# Patient Record
Sex: MALE | Race: WHITE | Employment: PART TIME | URBAN - METROPOLITAN AREA
[De-identification: names, ages, dates, MRNs, and addresses within clinical notes are randomized per-mention and may not be internally consistent; named-entity substitution may affect disease eponyms.]

---

## 2017-01-01 ENCOUNTER — HOSPITAL ENCOUNTER (OUTPATIENT)
Dept: INFUSION THERAPY | Age: 67
Discharge: HOME | End: 2017-05-30
Payer: MEDICARE

## 2017-01-01 ENCOUNTER — HOSPITAL ENCOUNTER (OUTPATIENT)
Dept: LAB | Age: 67
Discharge: HOME | DRG: 445 | End: 2017-10-09
Payer: MEDICARE

## 2017-01-01 ENCOUNTER — APPOINTMENT (OUTPATIENT)
Dept: CT IMAGING | Age: 67
DRG: 388 | End: 2017-01-01
Attending: INTERNAL MEDICINE
Payer: MEDICARE

## 2017-01-01 ENCOUNTER — HOSPITAL ENCOUNTER (OUTPATIENT)
Dept: LAB | Age: 67
Discharge: HOME | End: 2017-05-30
Payer: MEDICARE

## 2017-01-01 ENCOUNTER — HOSPITAL ENCOUNTER (OUTPATIENT)
Dept: INFUSION THERAPY | Age: 67
End: 2017-01-01
Payer: MEDICARE

## 2017-01-01 ENCOUNTER — PATIENT OUTREACH (OUTPATIENT)
Dept: CASE MANAGEMENT | Age: 67
End: 2017-01-01

## 2017-01-01 ENCOUNTER — APPOINTMENT (OUTPATIENT)
Dept: INFUSION THERAPY | Age: 67
End: 2017-01-01
Payer: MEDICARE

## 2017-01-01 ENCOUNTER — HOSPITAL ENCOUNTER (OUTPATIENT)
Dept: RADIATION ONCOLOGY | Age: 67
Discharge: HOME | End: 2017-01-10
Payer: MEDICARE

## 2017-01-01 ENCOUNTER — ANESTHESIA EVENT (OUTPATIENT)
Dept: ENDOSCOPY | Age: 67
DRG: 445 | End: 2017-01-01
Payer: MEDICARE

## 2017-01-01 ENCOUNTER — HOSPITAL ENCOUNTER (INPATIENT)
Age: 67
LOS: 5 days | Discharge: HOME | DRG: 388 | End: 2017-06-10
Attending: INTERNAL MEDICINE | Admitting: INTERNAL MEDICINE
Payer: MEDICARE

## 2017-01-01 ENCOUNTER — HOSPITAL ENCOUNTER (OUTPATIENT)
Dept: INFUSION THERAPY | Age: 67
Discharge: HOME | End: 2017-06-28
Payer: MEDICARE

## 2017-01-01 ENCOUNTER — HOSPITAL ENCOUNTER (OUTPATIENT)
Dept: INFUSION THERAPY | Age: 67
Discharge: HOME | End: 2017-08-23
Payer: MEDICARE

## 2017-01-01 ENCOUNTER — APPOINTMENT (OUTPATIENT)
Dept: CT IMAGING | Age: 67
DRG: 871 | End: 2017-01-01
Attending: NURSE PRACTITIONER
Payer: MEDICARE

## 2017-01-01 ENCOUNTER — HOSPITAL ENCOUNTER (OUTPATIENT)
Dept: LAB | Age: 67
Discharge: HOME | End: 2017-01-16
Payer: MEDICARE

## 2017-01-01 ENCOUNTER — HOSPITAL ENCOUNTER (INPATIENT)
Age: 67
LOS: 1 days | End: 2017-11-09
Attending: INTERNAL MEDICINE | Admitting: INTERNAL MEDICINE

## 2017-01-01 ENCOUNTER — ANESTHESIA EVENT (OUTPATIENT)
Dept: ENDOSCOPY | Age: 67
DRG: 388 | End: 2017-01-01
Payer: MEDICARE

## 2017-01-01 ENCOUNTER — HOSPITAL ENCOUNTER (OUTPATIENT)
Dept: LAB | Age: 67
Discharge: HOME | End: 2017-06-26
Payer: MEDICARE

## 2017-01-01 ENCOUNTER — HOSPITAL ENCOUNTER (OUTPATIENT)
Dept: LAB | Age: 67
Discharge: HOME | End: 2017-09-18
Payer: MEDICARE

## 2017-01-01 ENCOUNTER — HOSPITAL ENCOUNTER (OUTPATIENT)
Dept: LAB | Age: 67
Discharge: HOME | End: 2017-05-15
Payer: MEDICARE

## 2017-01-01 ENCOUNTER — HOSPITAL ENCOUNTER (OUTPATIENT)
Dept: CT IMAGING | Age: 67
Discharge: HOME | End: 2017-06-01
Attending: NURSE PRACTITIONER
Payer: MEDICARE

## 2017-01-01 ENCOUNTER — HOSPITAL ENCOUNTER (OUTPATIENT)
Dept: INFUSION THERAPY | Age: 67
Discharge: HOME | End: 2017-09-07
Payer: MEDICARE

## 2017-01-01 ENCOUNTER — HOSPITAL ENCOUNTER (OUTPATIENT)
Dept: LAB | Age: 67
Discharge: HOME | End: 2017-10-26
Payer: MEDICARE

## 2017-01-01 ENCOUNTER — HOSPITAL ENCOUNTER (OUTPATIENT)
Dept: LAB | Age: 67
Discharge: HOME | End: 2017-04-03
Payer: MEDICARE

## 2017-01-01 ENCOUNTER — HOSPITAL ENCOUNTER (OUTPATIENT)
Dept: RADIATION ONCOLOGY | Age: 67
Discharge: HOME | End: 2017-01-24
Payer: MEDICARE

## 2017-01-01 ENCOUNTER — APPOINTMENT (OUTPATIENT)
Dept: ULTRASOUND IMAGING | Age: 67
DRG: 871 | End: 2017-01-01
Attending: INTERNAL MEDICINE
Payer: MEDICARE

## 2017-01-01 ENCOUNTER — HOSPITAL ENCOUNTER (OUTPATIENT)
Dept: INFUSION THERAPY | Age: 67
Discharge: HOME | End: 2017-04-03
Payer: MEDICARE

## 2017-01-01 ENCOUNTER — HOSPITAL ENCOUNTER (OUTPATIENT)
Dept: LAB | Age: 67
Discharge: HOME | End: 2017-08-21
Payer: MEDICARE

## 2017-01-01 ENCOUNTER — APPOINTMENT (OUTPATIENT)
Dept: GENERAL RADIOLOGY | Age: 67
DRG: 871 | End: 2017-01-01
Attending: NURSE PRACTITIONER
Payer: MEDICARE

## 2017-01-01 ENCOUNTER — HOSPITAL ENCOUNTER (OUTPATIENT)
Dept: INFUSION THERAPY | Age: 67
Discharge: HOME | End: 2017-04-05
Payer: MEDICARE

## 2017-01-01 ENCOUNTER — HOSPITAL ENCOUNTER (OUTPATIENT)
Dept: INFUSION THERAPY | Age: 67
Discharge: HOME | End: 2017-08-21
Payer: MEDICARE

## 2017-01-01 ENCOUNTER — HOSPITAL ENCOUNTER (OUTPATIENT)
Dept: INFUSION THERAPY | Age: 67
Discharge: HOME | End: 2017-07-12
Payer: MEDICARE

## 2017-01-01 ENCOUNTER — HOSPITAL ENCOUNTER (OUTPATIENT)
Dept: LAB | Age: 67
Discharge: HOME | End: 2017-10-16
Payer: MEDICARE

## 2017-01-01 ENCOUNTER — APPOINTMENT (OUTPATIENT)
Dept: INFUSION THERAPY | Age: 67
End: 2017-01-01

## 2017-01-01 ENCOUNTER — HOSPITAL ENCOUNTER (OUTPATIENT)
Dept: CT IMAGING | Age: 67
Discharge: HOME | End: 2017-09-20
Attending: INTERNAL MEDICINE
Payer: MEDICARE

## 2017-01-01 ENCOUNTER — HOSPITAL ENCOUNTER (OUTPATIENT)
Dept: INFUSION THERAPY | Age: 67
Discharge: HOME | End: 2017-05-17
Payer: MEDICARE

## 2017-01-01 ENCOUNTER — HOSPITAL ENCOUNTER (OUTPATIENT)
Dept: INFUSION THERAPY | Age: 67
Discharge: HOME | End: 2017-09-18
Payer: MEDICARE

## 2017-01-01 ENCOUNTER — HOSPITAL ENCOUNTER (OUTPATIENT)
Dept: LAB | Age: 67
Discharge: HOME | End: 2017-03-20
Payer: MEDICARE

## 2017-01-01 ENCOUNTER — APPOINTMENT (OUTPATIENT)
Dept: GENERAL RADIOLOGY | Age: 67
DRG: 445 | End: 2017-01-01
Attending: INTERNAL MEDICINE
Payer: MEDICARE

## 2017-01-01 ENCOUNTER — HOSPITAL ENCOUNTER (OUTPATIENT)
Dept: LAB | Age: 67
Discharge: HOME | End: 2017-04-20
Payer: MEDICARE

## 2017-01-01 ENCOUNTER — HOSPITAL ENCOUNTER (OUTPATIENT)
Dept: LAB | Age: 67
Discharge: HOME | End: 2017-05-01
Payer: MEDICARE

## 2017-01-01 ENCOUNTER — HOSPITAL ENCOUNTER (OUTPATIENT)
Dept: RADIATION ONCOLOGY | Age: 67
Discharge: HOME | End: 2017-06-14
Payer: MEDICARE

## 2017-01-01 ENCOUNTER — HOSPITAL ENCOUNTER (OUTPATIENT)
Dept: INFUSION THERAPY | Age: 67
Discharge: HOME | End: 2017-03-20
Payer: MEDICARE

## 2017-01-01 ENCOUNTER — HOSPITAL ENCOUNTER (OUTPATIENT)
Dept: INFUSION THERAPY | Age: 67
Discharge: HOME | End: 2017-09-09
Payer: MEDICARE

## 2017-01-01 ENCOUNTER — ANESTHESIA (OUTPATIENT)
Dept: ENDOSCOPY | Age: 67
DRG: 445 | End: 2017-01-01
Payer: MEDICARE

## 2017-01-01 ENCOUNTER — HOSPITAL ENCOUNTER (OUTPATIENT)
Dept: INFUSION THERAPY | Age: 67
Discharge: HOME | End: 2017-06-26
Payer: MEDICARE

## 2017-01-01 ENCOUNTER — HOSPITAL ENCOUNTER (OUTPATIENT)
Dept: INFUSION THERAPY | Age: 67
Discharge: HOME | End: 2017-05-01
Payer: MEDICARE

## 2017-01-01 ENCOUNTER — HOSPITAL ENCOUNTER (OUTPATIENT)
Dept: LAB | Age: 67
Discharge: HOME | End: 2017-07-24
Payer: MEDICARE

## 2017-01-01 ENCOUNTER — HOSPITAL ENCOUNTER (OUTPATIENT)
Dept: LAB | Age: 67
Discharge: HOME | End: 2017-03-06
Payer: MEDICARE

## 2017-01-01 ENCOUNTER — HOSPITAL ENCOUNTER (OUTPATIENT)
Dept: INFUSION THERAPY | Age: 67
Discharge: HOME | End: 2017-04-22
Payer: MEDICARE

## 2017-01-01 ENCOUNTER — HOSPITAL ENCOUNTER (OUTPATIENT)
Dept: INFUSION THERAPY | Age: 67
Discharge: HOME | End: 2017-08-09
Payer: MEDICARE

## 2017-01-01 ENCOUNTER — HOSPICE ADMISSION (OUTPATIENT)
Dept: HOSPICE | Facility: HOSPICE | Age: 67
End: 2017-01-01
Payer: MEDICARE

## 2017-01-01 ENCOUNTER — HOSPITAL ENCOUNTER (OUTPATIENT)
Dept: INFUSION THERAPY | Age: 67
Discharge: HOME | End: 2017-07-26
Payer: MEDICARE

## 2017-01-01 ENCOUNTER — HOSPITAL ENCOUNTER (OUTPATIENT)
Dept: INFUSION THERAPY | Age: 67
Discharge: HOME | End: 2017-05-15
Payer: MEDICARE

## 2017-01-01 ENCOUNTER — HOSPITAL ENCOUNTER (OUTPATIENT)
Dept: GENERAL RADIOLOGY | Age: 67
Discharge: HOME | End: 2017-03-20
Attending: NURSE PRACTITIONER
Payer: MEDICARE

## 2017-01-01 ENCOUNTER — HOSPITAL ENCOUNTER (OUTPATIENT)
Dept: INFUSION THERAPY | Age: 67
Discharge: HOME | End: 2017-05-03
Payer: MEDICARE

## 2017-01-01 ENCOUNTER — HOSPITAL ENCOUNTER (EMERGENCY)
Age: 67
Discharge: HOME | End: 2017-10-14
Attending: EMERGENCY MEDICINE
Payer: MEDICARE

## 2017-01-01 ENCOUNTER — HOSPITAL ENCOUNTER (OUTPATIENT)
Dept: INFUSION THERAPY | Age: 67
Discharge: HOME | End: 2017-03-22
Payer: MEDICARE

## 2017-01-01 ENCOUNTER — HOSPITAL ENCOUNTER (OUTPATIENT)
Dept: INFUSION THERAPY | Age: 67
Discharge: HOME | End: 2017-04-20
Payer: MEDICARE

## 2017-01-01 ENCOUNTER — DOCUMENTATION ONLY (OUTPATIENT)
Dept: ONCOLOGY | Age: 67
End: 2017-01-01

## 2017-01-01 ENCOUNTER — APPOINTMENT (OUTPATIENT)
Dept: GENERAL RADIOLOGY | Age: 67
DRG: 871 | End: 2017-01-01
Attending: INTERNAL MEDICINE
Payer: MEDICARE

## 2017-01-01 ENCOUNTER — HOSPITAL ENCOUNTER (OUTPATIENT)
Dept: INFUSION THERAPY | Age: 67
End: 2017-01-01

## 2017-01-01 ENCOUNTER — HOSPITAL ENCOUNTER (OUTPATIENT)
Dept: LAB | Age: 67
Discharge: HOME | End: 2017-07-10
Payer: MEDICARE

## 2017-01-01 ENCOUNTER — HOSPITAL ENCOUNTER (OUTPATIENT)
Dept: INFUSION THERAPY | Age: 67
Discharge: HOME | End: 2017-08-07
Payer: MEDICARE

## 2017-01-01 ENCOUNTER — HOSPITAL ENCOUNTER (OUTPATIENT)
Dept: INFUSION THERAPY | Age: 67
Discharge: HOME | End: 2017-10-16
Payer: MEDICARE

## 2017-01-01 ENCOUNTER — APPOINTMENT (OUTPATIENT)
Dept: ULTRASOUND IMAGING | Age: 67
End: 2017-01-01
Attending: EMERGENCY MEDICINE
Payer: MEDICARE

## 2017-01-01 ENCOUNTER — HOSPITAL ENCOUNTER (INPATIENT)
Age: 67
LOS: 3 days | Discharge: HOME | DRG: 445 | End: 2017-10-12
Attending: INTERNAL MEDICINE | Admitting: INTERNAL MEDICINE
Payer: MEDICARE

## 2017-01-01 ENCOUNTER — ANESTHESIA (OUTPATIENT)
Dept: ENDOSCOPY | Age: 67
DRG: 388 | End: 2017-01-01
Payer: MEDICARE

## 2017-01-01 ENCOUNTER — APPOINTMENT (OUTPATIENT)
Dept: ULTRASOUND IMAGING | Age: 67
DRG: 445 | End: 2017-01-01
Attending: NURSE PRACTITIONER
Payer: MEDICARE

## 2017-01-01 ENCOUNTER — HOSPITAL ENCOUNTER (INPATIENT)
Age: 67
LOS: 8 days | Discharge: HOSPICE | DRG: 871 | End: 2017-11-08
Attending: EMERGENCY MEDICINE | Admitting: NURSE PRACTITIONER
Payer: MEDICARE

## 2017-01-01 ENCOUNTER — HOSPITAL ENCOUNTER (OUTPATIENT)
Dept: LAB | Age: 67
Discharge: HOME | End: 2017-09-06
Payer: MEDICARE

## 2017-01-01 ENCOUNTER — HOSPITAL ENCOUNTER (OUTPATIENT)
Dept: LAB | Age: 67
Discharge: HOME | End: 2017-08-07
Payer: MEDICARE

## 2017-01-01 ENCOUNTER — HOSPITAL ENCOUNTER (OUTPATIENT)
Dept: INFUSION THERAPY | Age: 67
Discharge: HOME | End: 2017-07-10
Payer: MEDICARE

## 2017-01-01 ENCOUNTER — HOSPITAL ENCOUNTER (OUTPATIENT)
Dept: INFUSION THERAPY | Age: 67
Discharge: HOME | End: 2017-07-24
Payer: MEDICARE

## 2017-01-01 ENCOUNTER — HOSPITAL ENCOUNTER (OUTPATIENT)
Dept: INFUSION THERAPY | Age: 67
Discharge: HOME | End: 2017-06-01
Payer: MEDICARE

## 2017-01-01 ENCOUNTER — HOSPITAL ENCOUNTER (OUTPATIENT)
Dept: RADIATION ONCOLOGY | Age: 67
Discharge: HOME | End: 2017-03-14
Payer: MEDICARE

## 2017-01-01 ENCOUNTER — HOSPITAL ENCOUNTER (OUTPATIENT)
Dept: INFUSION THERAPY | Age: 67
Discharge: HOME | End: 2017-10-19
Payer: MEDICARE

## 2017-01-01 ENCOUNTER — HOSPITAL ENCOUNTER (OUTPATIENT)
Dept: CT IMAGING | Age: 67
Discharge: HOME | End: 2017-01-09
Attending: INTERNAL MEDICINE
Payer: MEDICARE

## 2017-01-01 ENCOUNTER — HOSPITAL ENCOUNTER (OUTPATIENT)
Dept: LAB | Age: 67
Discharge: HOME | End: 2017-10-19

## 2017-01-01 ENCOUNTER — HOSPITAL ENCOUNTER (OUTPATIENT)
Dept: LAB | Age: 67
Discharge: HOME | End: 2017-06-14
Payer: MEDICARE

## 2017-01-01 ENCOUNTER — APPOINTMENT (OUTPATIENT)
Dept: GENERAL RADIOLOGY | Age: 67
DRG: 871 | End: 2017-01-01
Attending: EMERGENCY MEDICINE
Payer: MEDICARE

## 2017-01-01 VITALS
SYSTOLIC BLOOD PRESSURE: 138 MMHG | WEIGHT: 184.2 LBS | HEART RATE: 76 BPM | BODY MASS INDEX: 28.01 KG/M2 | TEMPERATURE: 97.9 F | DIASTOLIC BLOOD PRESSURE: 69 MMHG | OXYGEN SATURATION: 95 % | RESPIRATION RATE: 18 BRPM

## 2017-01-01 VITALS
OXYGEN SATURATION: 93 % | DIASTOLIC BLOOD PRESSURE: 68 MMHG | WEIGHT: 173.2 LBS | SYSTOLIC BLOOD PRESSURE: 141 MMHG | TEMPERATURE: 97.7 F | BODY MASS INDEX: 26.33 KG/M2 | HEART RATE: 90 BPM | RESPIRATION RATE: 18 BRPM

## 2017-01-01 VITALS
OXYGEN SATURATION: 93 % | TEMPERATURE: 97.9 F | DIASTOLIC BLOOD PRESSURE: 75 MMHG | WEIGHT: 182 LBS | TEMPERATURE: 97.9 F | SYSTOLIC BLOOD PRESSURE: 131 MMHG | HEART RATE: 119 BPM | SYSTOLIC BLOOD PRESSURE: 130 MMHG | OXYGEN SATURATION: 94 % | BODY MASS INDEX: 28.44 KG/M2 | BODY MASS INDEX: 27.67 KG/M2 | RESPIRATION RATE: 18 BRPM | RESPIRATION RATE: 18 BRPM | DIASTOLIC BLOOD PRESSURE: 73 MMHG | WEIGHT: 189.8 LBS | HEART RATE: 76 BPM

## 2017-01-01 VITALS
TEMPERATURE: 97.6 F | BODY MASS INDEX: 26.67 KG/M2 | DIASTOLIC BLOOD PRESSURE: 82 MMHG | RESPIRATION RATE: 18 BRPM | WEIGHT: 175.4 LBS | OXYGEN SATURATION: 97 % | SYSTOLIC BLOOD PRESSURE: 148 MMHG | HEART RATE: 97 BPM

## 2017-01-01 VITALS
DIASTOLIC BLOOD PRESSURE: 88 MMHG | SYSTOLIC BLOOD PRESSURE: 141 MMHG | BODY MASS INDEX: 27.37 KG/M2 | HEART RATE: 76 BPM | WEIGHT: 180 LBS | OXYGEN SATURATION: 98 % | TEMPERATURE: 97.9 F | RESPIRATION RATE: 18 BRPM

## 2017-01-01 VITALS
HEART RATE: 63 BPM | OXYGEN SATURATION: 99 % | DIASTOLIC BLOOD PRESSURE: 76 MMHG | TEMPERATURE: 97.7 F | BODY MASS INDEX: 27.94 KG/M2 | SYSTOLIC BLOOD PRESSURE: 149 MMHG | WEIGHT: 186.5 LBS

## 2017-01-01 VITALS — OXYGEN SATURATION: 96 %

## 2017-01-01 VITALS
DIASTOLIC BLOOD PRESSURE: 72 MMHG | RESPIRATION RATE: 18 BRPM | SYSTOLIC BLOOD PRESSURE: 139 MMHG | WEIGHT: 174.8 LBS | TEMPERATURE: 97.6 F | HEART RATE: 103 BPM | BODY MASS INDEX: 26.19 KG/M2 | OXYGEN SATURATION: 98 %

## 2017-01-01 VITALS
TEMPERATURE: 97.6 F | WEIGHT: 180.5 LBS | RESPIRATION RATE: 18 BRPM | DIASTOLIC BLOOD PRESSURE: 77 MMHG | OXYGEN SATURATION: 97 % | SYSTOLIC BLOOD PRESSURE: 151 MMHG | BODY MASS INDEX: 27.44 KG/M2 | HEART RATE: 78 BPM

## 2017-01-01 VITALS
OXYGEN SATURATION: 92 % | SYSTOLIC BLOOD PRESSURE: 133 MMHG | TEMPERATURE: 98.6 F | DIASTOLIC BLOOD PRESSURE: 67 MMHG | BODY MASS INDEX: 27.16 KG/M2 | WEIGHT: 178.6 LBS | RESPIRATION RATE: 18 BRPM | HEART RATE: 75 BPM

## 2017-01-01 VITALS
RESPIRATION RATE: 18 BRPM | TEMPERATURE: 98.2 F | SYSTOLIC BLOOD PRESSURE: 141 MMHG | HEART RATE: 70 BPM | OXYGEN SATURATION: 94 % | DIASTOLIC BLOOD PRESSURE: 80 MMHG

## 2017-01-01 VITALS
RESPIRATION RATE: 18 BRPM | OXYGEN SATURATION: 96 % | HEART RATE: 93 BPM | TEMPERATURE: 97.9 F | DIASTOLIC BLOOD PRESSURE: 76 MMHG | SYSTOLIC BLOOD PRESSURE: 142 MMHG

## 2017-01-01 VITALS
RESPIRATION RATE: 18 BRPM | BODY MASS INDEX: 26.91 KG/M2 | TEMPERATURE: 97.9 F | SYSTOLIC BLOOD PRESSURE: 114 MMHG | OXYGEN SATURATION: 96 % | HEART RATE: 84 BPM | DIASTOLIC BLOOD PRESSURE: 71 MMHG | WEIGHT: 177 LBS

## 2017-01-01 VITALS
RESPIRATION RATE: 18 BRPM | BODY MASS INDEX: 27.7 KG/M2 | HEART RATE: 74 BPM | SYSTOLIC BLOOD PRESSURE: 149 MMHG | WEIGHT: 182.2 LBS | TEMPERATURE: 97.7 F | DIASTOLIC BLOOD PRESSURE: 79 MMHG | OXYGEN SATURATION: 93 %

## 2017-01-01 VITALS
BODY MASS INDEX: 27.31 KG/M2 | TEMPERATURE: 98.6 F | DIASTOLIC BLOOD PRESSURE: 81 MMHG | RESPIRATION RATE: 18 BRPM | HEART RATE: 79 BPM | WEIGHT: 179.6 LBS | OXYGEN SATURATION: 95 % | SYSTOLIC BLOOD PRESSURE: 127 MMHG

## 2017-01-01 VITALS
BODY MASS INDEX: 28.05 KG/M2 | TEMPERATURE: 97.2 F | DIASTOLIC BLOOD PRESSURE: 83 MMHG | OXYGEN SATURATION: 95 % | HEART RATE: 63 BPM | SYSTOLIC BLOOD PRESSURE: 141 MMHG | WEIGHT: 187.2 LBS | RESPIRATION RATE: 18 BRPM

## 2017-01-01 VITALS
TEMPERATURE: 97.8 F | WEIGHT: 184.6 LBS | SYSTOLIC BLOOD PRESSURE: 126 MMHG | HEART RATE: 83 BPM | RESPIRATION RATE: 18 BRPM | BODY MASS INDEX: 27.66 KG/M2 | DIASTOLIC BLOOD PRESSURE: 82 MMHG | OXYGEN SATURATION: 96 %

## 2017-01-01 VITALS
RESPIRATION RATE: 18 BRPM | OXYGEN SATURATION: 94 % | TEMPERATURE: 97.7 F | DIASTOLIC BLOOD PRESSURE: 74 MMHG | WEIGHT: 175 LBS | HEART RATE: 81 BPM | BODY MASS INDEX: 26.61 KG/M2 | SYSTOLIC BLOOD PRESSURE: 124 MMHG

## 2017-01-01 VITALS
HEIGHT: 68 IN | RESPIRATION RATE: 26 BRPM | DIASTOLIC BLOOD PRESSURE: 45 MMHG | OXYGEN SATURATION: 91 % | HEART RATE: 113 BPM | SYSTOLIC BLOOD PRESSURE: 88 MMHG | TEMPERATURE: 97.2 F | WEIGHT: 194.67 LBS | BODY MASS INDEX: 29.5 KG/M2

## 2017-01-01 VITALS
HEIGHT: 68 IN | DIASTOLIC BLOOD PRESSURE: 90 MMHG | SYSTOLIC BLOOD PRESSURE: 168 MMHG | HEART RATE: 74 BPM | RESPIRATION RATE: 22 BRPM | WEIGHT: 182 LBS | TEMPERATURE: 97.8 F | OXYGEN SATURATION: 97 % | BODY MASS INDEX: 27.58 KG/M2

## 2017-01-01 VITALS
WEIGHT: 179.8 LBS | HEART RATE: 66 BPM | SYSTOLIC BLOOD PRESSURE: 135 MMHG | BODY MASS INDEX: 27.34 KG/M2 | DIASTOLIC BLOOD PRESSURE: 73 MMHG | TEMPERATURE: 98.1 F | OXYGEN SATURATION: 93 % | RESPIRATION RATE: 18 BRPM

## 2017-01-01 VITALS
BODY MASS INDEX: 27.76 KG/M2 | DIASTOLIC BLOOD PRESSURE: 83 MMHG | OXYGEN SATURATION: 96 % | SYSTOLIC BLOOD PRESSURE: 132 MMHG | WEIGHT: 182.6 LBS | RESPIRATION RATE: 18 BRPM | HEART RATE: 67 BPM | TEMPERATURE: 97 F

## 2017-01-01 VITALS
SYSTOLIC BLOOD PRESSURE: 74 MMHG | DIASTOLIC BLOOD PRESSURE: 42 MMHG | RESPIRATION RATE: 29 BRPM | TEMPERATURE: 96 F | HEART RATE: 108 BPM

## 2017-01-01 VITALS — HEIGHT: 69 IN | BODY MASS INDEX: 24.44 KG/M2 | WEIGHT: 165 LBS

## 2017-01-01 DIAGNOSIS — A41.9 SEPTIC SHOCK (HCC): Primary | ICD-10-CM

## 2017-01-01 DIAGNOSIS — C78.7 METASTATIC COLON CANCER TO LIVER (HCC): Chronic | ICD-10-CM

## 2017-01-01 DIAGNOSIS — C18.9 METASTATIC COLON CANCER TO LIVER (HCC): Chronic | ICD-10-CM

## 2017-01-01 DIAGNOSIS — C18.9 METASTATIC COLON CANCER TO LIVER (HCC): ICD-10-CM

## 2017-01-01 DIAGNOSIS — C18.9 MALIGNANT NEOPLASM OF COLON, UNSPECIFIED PART OF COLON (HCC): ICD-10-CM

## 2017-01-01 DIAGNOSIS — C18.9 MALIGNANT NEOPLASM OF COLON, UNSPECIFIED PART OF COLON (HCC): Chronic | ICD-10-CM

## 2017-01-01 DIAGNOSIS — N17.9 ACUTE KIDNEY INJURY (HCC): ICD-10-CM

## 2017-01-01 DIAGNOSIS — E80.6 HYPERBILIRUBINEMIA: ICD-10-CM

## 2017-01-01 DIAGNOSIS — C78.7 COLON CANCER METASTASIZED TO LIVER (HCC): ICD-10-CM

## 2017-01-01 DIAGNOSIS — C78.7 METASTATIC COLON CANCER TO LIVER (HCC): ICD-10-CM

## 2017-01-01 DIAGNOSIS — C18.9 COLON CANCER METASTASIZED TO LIVER (HCC): ICD-10-CM

## 2017-01-01 DIAGNOSIS — J90 PLEURAL EFFUSION: ICD-10-CM

## 2017-01-01 DIAGNOSIS — N30.01 ACUTE CYSTITIS WITH HEMATURIA: ICD-10-CM

## 2017-01-01 DIAGNOSIS — R17 JAUNDICE: Primary | ICD-10-CM

## 2017-01-01 DIAGNOSIS — R62.51 FAILURE TO THRIVE (0-17): ICD-10-CM

## 2017-01-01 DIAGNOSIS — K72.10 CHRONIC LIVER FAILURE WITHOUT HEPATIC COMA (HCC): ICD-10-CM

## 2017-01-01 DIAGNOSIS — E86.0 DEHYDRATION: ICD-10-CM

## 2017-01-01 DIAGNOSIS — R09.02 HYPOXEMIA: ICD-10-CM

## 2017-01-01 DIAGNOSIS — J90 PLEURAL EFFUSION ON RIGHT: ICD-10-CM

## 2017-01-01 DIAGNOSIS — K59.09 OTHER CONSTIPATION: ICD-10-CM

## 2017-01-01 DIAGNOSIS — R53.0 NEOPLASTIC MALIGNANT RELATED FATIGUE: ICD-10-CM

## 2017-01-01 DIAGNOSIS — M25.511 RIGHT SHOULDER PAIN, UNSPECIFIED CHRONICITY: ICD-10-CM

## 2017-01-01 DIAGNOSIS — K72.00 ACUTE LIVER FAILURE WITHOUT HEPATIC COMA: ICD-10-CM

## 2017-01-01 DIAGNOSIS — R65.21 SEPTIC SHOCK (HCC): Primary | ICD-10-CM

## 2017-01-01 DIAGNOSIS — R41.82 ALTERED MENTAL STATUS, UNSPECIFIED ALTERED MENTAL STATUS TYPE: ICD-10-CM

## 2017-01-01 DIAGNOSIS — R11.0 NAUSEA: ICD-10-CM

## 2017-01-01 DIAGNOSIS — J18.9 PNEUMONIA OF RIGHT LOWER LOBE DUE TO INFECTIOUS ORGANISM: ICD-10-CM

## 2017-01-01 DIAGNOSIS — R53.83 FATIGUE, UNSPECIFIED TYPE: ICD-10-CM

## 2017-01-01 DIAGNOSIS — L27.1 HAND FOOT SYNDROME: ICD-10-CM

## 2017-01-01 DIAGNOSIS — R53.82 CHRONIC FATIGUE: ICD-10-CM

## 2017-01-01 DIAGNOSIS — R17 TOTAL BILIRUBIN, ELEVATED: ICD-10-CM

## 2017-01-01 LAB
ABO + RH BLD: NORMAL
ALBUMIN SERPL BCP-MCNC: 2.3 G/DL (ref 3.2–4.6)
ALBUMIN SERPL BCP-MCNC: 2.9 G/DL (ref 3.2–4.6)
ALBUMIN SERPL BCP-MCNC: 3 G/DL (ref 3.2–4.6)
ALBUMIN SERPL BCP-MCNC: 3.1 G/DL (ref 3.2–4.6)
ALBUMIN SERPL BCP-MCNC: 3.2 G/DL (ref 3.2–4.6)
ALBUMIN SERPL BCP-MCNC: 3.3 G/DL (ref 3.2–4.6)
ALBUMIN SERPL BCP-MCNC: 3.4 G/DL (ref 3.2–4.6)
ALBUMIN SERPL BCP-MCNC: 3.4 G/DL (ref 3.2–4.6)
ALBUMIN SERPL-MCNC: 1.1 G/DL (ref 3.2–4.6)
ALBUMIN SERPL-MCNC: 1.2 G/DL (ref 3.2–4.6)
ALBUMIN SERPL-MCNC: 1.2 G/DL (ref 3.2–4.6)
ALBUMIN SERPL-MCNC: 1.3 G/DL (ref 3.2–4.6)
ALBUMIN SERPL-MCNC: 1.3 G/DL (ref 3.2–4.6)
ALBUMIN SERPL-MCNC: 1.6 G/DL (ref 3.2–4.6)
ALBUMIN SERPL-MCNC: 1.7 G/DL (ref 3.2–4.6)
ALBUMIN SERPL-MCNC: 1.9 G/DL (ref 3.2–4.6)
ALBUMIN SERPL-MCNC: 1.9 G/DL (ref 3.2–4.6)
ALBUMIN SERPL-MCNC: 2 G/DL (ref 3.2–4.6)
ALBUMIN SERPL-MCNC: 2.2 G/DL (ref 3.2–4.6)
ALBUMIN SERPL-MCNC: 2.2 G/DL (ref 3.2–4.6)
ALBUMIN SERPL-MCNC: 2.3 G/DL (ref 3.2–4.6)
ALBUMIN SERPL-MCNC: 2.9 G/DL (ref 3.2–4.6)
ALBUMIN SERPL-MCNC: 3 G/DL (ref 3.2–4.6)
ALBUMIN SERPL-MCNC: 3.1 G/DL (ref 3.2–4.6)
ALBUMIN/GLOB SERPL: 0.3 {RATIO} (ref 1.2–3.5)
ALBUMIN/GLOB SERPL: 0.4 {RATIO} (ref 1.2–3.5)
ALBUMIN/GLOB SERPL: 0.5 {RATIO} (ref 1.2–3.5)
ALBUMIN/GLOB SERPL: 0.6 {RATIO}
ALBUMIN/GLOB SERPL: 0.6 {RATIO}
ALBUMIN/GLOB SERPL: 0.6 {RATIO} (ref 1.2–3.5)
ALBUMIN/GLOB SERPL: 0.6 {RATIO} (ref 1.2–3.5)
ALBUMIN/GLOB SERPL: 0.8 {RATIO} (ref 1.2–3.5)
ALBUMIN/GLOB SERPL: 0.9 {RATIO} (ref 1.2–3.5)
ALBUMIN/GLOB SERPL: 1 {RATIO} (ref 1.2–3.5)
ALBUMIN/GLOB SERPL: 1.1 {RATIO} (ref 1.2–3.5)
ALP SERPL-CCNC: 1002 U/L (ref 50–136)
ALP SERPL-CCNC: 1046 U/L (ref 50–136)
ALP SERPL-CCNC: 167 U/L (ref 50–136)
ALP SERPL-CCNC: 187 U/L (ref 50–136)
ALP SERPL-CCNC: 190 U/L (ref 50–136)
ALP SERPL-CCNC: 191 U/L (ref 50–136)
ALP SERPL-CCNC: 194 U/L (ref 50–136)
ALP SERPL-CCNC: 198 U/L (ref 50–136)
ALP SERPL-CCNC: 198 U/L (ref 50–136)
ALP SERPL-CCNC: 199 U/L (ref 50–136)
ALP SERPL-CCNC: 201 U/L (ref 50–136)
ALP SERPL-CCNC: 208 U/L (ref 50–136)
ALP SERPL-CCNC: 210 U/L (ref 50–136)
ALP SERPL-CCNC: 217 U/L (ref 50–136)
ALP SERPL-CCNC: 230 U/L (ref 50–136)
ALP SERPL-CCNC: 242 U/L (ref 50–136)
ALP SERPL-CCNC: 243 U/L (ref 50–136)
ALP SERPL-CCNC: 260 U/L (ref 50–136)
ALP SERPL-CCNC: 263 U/L (ref 50–136)
ALP SERPL-CCNC: 484 U/L (ref 50–136)
ALP SERPL-CCNC: 545 U/L (ref 50–136)
ALP SERPL-CCNC: 563 U/L (ref 50–136)
ALP SERPL-CCNC: 571 U/L (ref 50–136)
ALP SERPL-CCNC: 603 U/L (ref 50–136)
ALP SERPL-CCNC: 636 U/L (ref 50–136)
ALP SERPL-CCNC: 675 U/L (ref 50–136)
ALP SERPL-CCNC: 745 U/L (ref 50–136)
ALP SERPL-CCNC: 767 U/L (ref 50–136)
ALP SERPL-CCNC: 768 U/L (ref 50–136)
ALP SERPL-CCNC: 793 U/L (ref 50–136)
ALP SERPL-CCNC: 895 U/L (ref 50–136)
ALP SERPL-CCNC: 946 U/L (ref 50–136)
ALT SERPL-CCNC: 104 U/L (ref 12–65)
ALT SERPL-CCNC: 107 U/L (ref 12–65)
ALT SERPL-CCNC: 124 U/L (ref 12–65)
ALT SERPL-CCNC: 141 U/L (ref 12–65)
ALT SERPL-CCNC: 142 U/L (ref 12–65)
ALT SERPL-CCNC: 149 U/L (ref 12–65)
ALT SERPL-CCNC: 152 U/L (ref 12–65)
ALT SERPL-CCNC: 25 U/L (ref 12–65)
ALT SERPL-CCNC: 31 U/L (ref 12–65)
ALT SERPL-CCNC: 32 U/L (ref 12–65)
ALT SERPL-CCNC: 34 U/L (ref 12–65)
ALT SERPL-CCNC: 36 U/L (ref 12–65)
ALT SERPL-CCNC: 37 U/L (ref 12–65)
ALT SERPL-CCNC: 37 U/L (ref 12–65)
ALT SERPL-CCNC: 38 U/L (ref 12–65)
ALT SERPL-CCNC: 39 U/L (ref 12–65)
ALT SERPL-CCNC: 39 U/L (ref 12–65)
ALT SERPL-CCNC: 40 U/L (ref 12–65)
ALT SERPL-CCNC: 42 U/L (ref 12–65)
ALT SERPL-CCNC: 47 U/L (ref 12–65)
ALT SERPL-CCNC: 51 U/L (ref 12–65)
ALT SERPL-CCNC: 58 U/L (ref 12–65)
ALT SERPL-CCNC: 59 U/L (ref 12–65)
ALT SERPL-CCNC: 62 U/L (ref 12–65)
ALT SERPL-CCNC: 63 U/L (ref 12–65)
ALT SERPL-CCNC: 66 U/L (ref 12–65)
AMMONIA PLAS-SCNC: 13 UMOL/L (ref 11–32)
AMMONIA PLAS-SCNC: <10 UMOL/L (ref 11–32)
AMMONIA PLAS-SCNC: <10 UMOL/L (ref 11–32)
AMMONIA PLAS-SCNC: <17 UMOL/L (ref 11–32)
AMORPH CRY URNS QL MICRO: ABNORMAL
ANION GAP BLD CALC-SCNC: 10 MMOL/L (ref 7–16)
ANION GAP BLD CALC-SCNC: 11 MMOL/L (ref 7–16)
ANION GAP BLD CALC-SCNC: 11 MMOL/L (ref 7–16)
ANION GAP BLD CALC-SCNC: 3 MMOL/L (ref 7–16)
ANION GAP BLD CALC-SCNC: 5 MMOL/L (ref 7–16)
ANION GAP BLD CALC-SCNC: 6 MMOL/L (ref 7–16)
ANION GAP BLD CALC-SCNC: 7 MMOL/L (ref 7–16)
ANION GAP BLD CALC-SCNC: 8 MMOL/L (ref 7–16)
ANION GAP BLD CALC-SCNC: 9 MMOL/L (ref 7–16)
ANION GAP SERPL CALC-SCNC: 10 MMOL/L (ref 7–16)
ANION GAP SERPL CALC-SCNC: 10 MMOL/L (ref 7–16)
ANION GAP SERPL CALC-SCNC: 11 MMOL/L (ref 7–16)
ANION GAP SERPL CALC-SCNC: 11 MMOL/L (ref 7–16)
ANION GAP SERPL CALC-SCNC: 12 MMOL/L (ref 7–16)
ANION GAP SERPL CALC-SCNC: 12 MMOL/L (ref 7–16)
ANION GAP SERPL CALC-SCNC: 14 MMOL/L (ref 7–16)
ANION GAP SERPL CALC-SCNC: 4 MMOL/L (ref 7–16)
ANION GAP SERPL CALC-SCNC: 5 MMOL/L (ref 7–16)
ANION GAP SERPL CALC-SCNC: 6 MMOL/L
ANION GAP SERPL CALC-SCNC: 6 MMOL/L (ref 7–16)
ANION GAP SERPL CALC-SCNC: 6 MMOL/L (ref 7–16)
ANION GAP SERPL CALC-SCNC: 7 MMOL/L
ANION GAP SERPL CALC-SCNC: 7 MMOL/L (ref 7–16)
ANION GAP SERPL CALC-SCNC: 8 MMOL/L (ref 7–16)
ANION GAP SERPL CALC-SCNC: 9 MMOL/L (ref 7–16)
APPEARANCE FLD: CLEAR
APPEARANCE FLD: NORMAL
APPEARANCE UR: ABNORMAL
APPEARANCE UR: ABNORMAL
APTT PPP: 48.3 SEC (ref 23.5–31.7)
AST SERPL W P-5'-P-CCNC: 25 U/L (ref 15–37)
AST SERPL W P-5'-P-CCNC: 29 U/L (ref 15–37)
AST SERPL W P-5'-P-CCNC: 31 U/L (ref 15–37)
AST SERPL W P-5'-P-CCNC: 32 U/L (ref 15–37)
AST SERPL W P-5'-P-CCNC: 32 U/L (ref 15–37)
AST SERPL W P-5'-P-CCNC: 34 U/L (ref 15–37)
AST SERPL W P-5'-P-CCNC: 36 U/L (ref 15–37)
AST SERPL W P-5'-P-CCNC: 37 U/L (ref 15–37)
AST SERPL W P-5'-P-CCNC: 38 U/L (ref 15–37)
AST SERPL W P-5'-P-CCNC: 38 U/L (ref 15–37)
AST SERPL W P-5'-P-CCNC: 40 U/L (ref 15–37)
AST SERPL W P-5'-P-CCNC: 40 U/L (ref 15–37)
AST SERPL-CCNC: 102 U/L (ref 15–37)
AST SERPL-CCNC: 113 U/L (ref 15–37)
AST SERPL-CCNC: 141 U/L (ref 15–37)
AST SERPL-CCNC: 167 U/L (ref 15–37)
AST SERPL-CCNC: 187 U/L (ref 15–37)
AST SERPL-CCNC: 219 U/L (ref 15–37)
AST SERPL-CCNC: 219 U/L (ref 15–37)
AST SERPL-CCNC: 250 U/L (ref 15–37)
AST SERPL-CCNC: 33 U/L (ref 15–37)
AST SERPL-CCNC: 34 U/L (ref 15–37)
AST SERPL-CCNC: 41 U/L (ref 15–37)
AST SERPL-CCNC: 56 U/L (ref 15–37)
AST SERPL-CCNC: 58 U/L (ref 15–37)
AST SERPL-CCNC: 65 U/L (ref 15–37)
AST SERPL-CCNC: 84 U/L (ref 15–37)
AST SERPL-CCNC: 87 U/L (ref 15–37)
AST SERPL-CCNC: 98 U/L (ref 15–37)
AST SERPL-CCNC: 99 U/L (ref 15–37)
ATRIAL RATE: 122 BPM
BACTERIA SPEC CULT: NEGATIVE
BACTERIA SPEC CULT: NORMAL
BACTERIA URNS QL MICRO: 0 /HPF
BACTERIA URNS QL MICRO: 0 /HPF
BASOPHILS # BLD AUTO: 0 K/UL (ref 0–0.2)
BASOPHILS # BLD: 0 % (ref 0–2)
BASOPHILS # BLD: 0 K/UL (ref 0–0.2)
BASOPHILS # BLD: 0.1 K/UL (ref 0–0.2)
BASOPHILS # BLD: 0.1 K/UL (ref 0–0.2)
BASOPHILS # BLD: 1 % (ref 0–2)
BASOPHILS NFR BLD: 0 % (ref 0–2)
BASOPHILS NFR BLD: 1 % (ref 0–2)
BILIRUB DIRECT SERPL-MCNC: 7.9 MG/DL
BILIRUB SERPL-MCNC: 0.4 MG/DL (ref 0.2–1.1)
BILIRUB SERPL-MCNC: 0.5 MG/DL (ref 0.2–1.1)
BILIRUB SERPL-MCNC: 0.5 MG/DL (ref 0.2–1.1)
BILIRUB SERPL-MCNC: 0.6 MG/DL (ref 0.2–1.1)
BILIRUB SERPL-MCNC: 0.7 MG/DL (ref 0.2–1.1)
BILIRUB SERPL-MCNC: 0.8 MG/DL (ref 0.2–1.1)
BILIRUB SERPL-MCNC: 10 MG/DL (ref 0.2–1.1)
BILIRUB SERPL-MCNC: 10.9 MG/DL (ref 0.2–1.1)
BILIRUB SERPL-MCNC: 10.9 MG/DL (ref 0.2–1.1)
BILIRUB SERPL-MCNC: 11.7 MG/DL (ref 0.2–1.1)
BILIRUB SERPL-MCNC: 13.5 MG/DL (ref 0.2–1.1)
BILIRUB SERPL-MCNC: 5.6 MG/DL (ref 0.2–1.1)
BILIRUB SERPL-MCNC: 7.7 MG/DL (ref 0.2–1.1)
BILIRUB SERPL-MCNC: 8.2 MG/DL (ref 0.2–1.1)
BILIRUB SERPL-MCNC: 8.7 MG/DL (ref 0.2–1.1)
BILIRUB SERPL-MCNC: 8.7 MG/DL (ref 0.2–1.1)
BILIRUB SERPL-MCNC: 9.1 MG/DL (ref 0.2–1.1)
BILIRUB SERPL-MCNC: 9.3 MG/DL (ref 0.2–1.1)
BILIRUB SERPL-MCNC: 9.7 MG/DL (ref 0.2–1.1)
BILIRUB UR QL: ABNORMAL
BILIRUB UR QL: ABNORMAL
BLOOD BANK CMNT PATIENT-IMP: NORMAL
BLOOD GROUP ANTIBODIES SERPL: NORMAL
BLOOD GROUP ANTIBODIES SERPL: NORMAL
BNP SERPL-MCNC: 290 PG/ML
BUN SERPL-MCNC: 12 MG/DL (ref 8–23)
BUN SERPL-MCNC: 14 MG/DL (ref 8–23)
BUN SERPL-MCNC: 15 MG/DL (ref 8–23)
BUN SERPL-MCNC: 16 MG/DL (ref 8–23)
BUN SERPL-MCNC: 17 MG/DL (ref 8–23)
BUN SERPL-MCNC: 19 MG/DL (ref 8–23)
BUN SERPL-MCNC: 19 MG/DL (ref 8–23)
BUN SERPL-MCNC: 20 MG/DL (ref 8–23)
BUN SERPL-MCNC: 22 MG/DL (ref 8–23)
BUN SERPL-MCNC: 23 MG/DL (ref 8–23)
BUN SERPL-MCNC: 23 MG/DL (ref 8–23)
BUN SERPL-MCNC: 24 MG/DL (ref 8–23)
BUN SERPL-MCNC: 27 MG/DL (ref 8–23)
BUN SERPL-MCNC: 28 MG/DL (ref 8–23)
BUN SERPL-MCNC: 39 MG/DL (ref 8–23)
BUN SERPL-MCNC: 39 MG/DL (ref 8–23)
BUN SERPL-MCNC: 42 MG/DL (ref 8–23)
BUN SERPL-MCNC: 42 MG/DL (ref 8–23)
BUN SERPL-MCNC: 47 MG/DL (ref 8–23)
BUN SERPL-MCNC: 53 MG/DL (ref 8–23)
BUN SERPL-MCNC: 67 MG/DL (ref 8–23)
BUN SERPL-MCNC: 84 MG/DL (ref 8–23)
BUN SERPL-MCNC: 99 MG/DL (ref 8–23)
CALCIUM SERPL-MCNC: 8.1 MG/DL (ref 8.3–10.4)
CALCIUM SERPL-MCNC: 8.2 MG/DL (ref 8.3–10.4)
CALCIUM SERPL-MCNC: 8.3 MG/DL (ref 8.3–10.4)
CALCIUM SERPL-MCNC: 8.3 MG/DL (ref 8.3–10.4)
CALCIUM SERPL-MCNC: 8.4 MG/DL (ref 8.3–10.4)
CALCIUM SERPL-MCNC: 8.5 MG/DL (ref 8.3–10.4)
CALCIUM SERPL-MCNC: 8.6 MG/DL (ref 8.3–10.4)
CALCIUM SERPL-MCNC: 8.6 MG/DL (ref 8.3–10.4)
CALCIUM SERPL-MCNC: 8.7 MG/DL (ref 8.3–10.4)
CALCIUM SERPL-MCNC: 8.7 MG/DL (ref 8.3–10.4)
CALCIUM SERPL-MCNC: 8.8 MG/DL (ref 8.3–10.4)
CALCIUM SERPL-MCNC: 8.9 MG/DL (ref 8.3–10.4)
CALCIUM SERPL-MCNC: 9 MG/DL (ref 8.3–10.4)
CALCIUM SERPL-MCNC: 9.1 MG/DL (ref 8.3–10.4)
CALCIUM SERPL-MCNC: 9.2 MG/DL (ref 8.3–10.4)
CALCIUM SERPL-MCNC: 9.3 MG/DL (ref 8.3–10.4)
CALCIUM SERPL-MCNC: 9.4 MG/DL (ref 8.3–10.4)
CALCIUM SERPL-MCNC: 9.5 MG/DL (ref 8.3–10.4)
CALCIUM SERPL-MCNC: 9.6 MG/DL (ref 8.3–10.4)
CALCIUM SERPL-MCNC: 9.8 MG/DL (ref 8.3–10.4)
CALCIUM SERPL-MCNC: 9.8 MG/DL (ref 8.3–10.4)
CALCULATED P AXIS, ECG09: 51 DEGREES
CALCULATED T AXIS, ECG11: 32 DEGREES
CASTS URNS QL MICRO: 0 /LPF
CASTS URNS QL MICRO: ABNORMAL /LPF
CEA SERPL-MCNC: 2.2 NG/ML (ref 0–3)
CEA SERPL-MCNC: 2.4 NG/ML (ref 0–3)
CEA SERPL-MCNC: 2.4 NG/ML (ref 0–3)
CEA SERPL-MCNC: 2.6 NG/ML (ref 0–3)
CEA SERPL-MCNC: 2.8 NG/ML (ref 0–3)
CEA SERPL-MCNC: 2.9 NG/ML (ref 0–3)
CEA SERPL-MCNC: 3.1 NG/ML (ref 0–3)
CEA SERPL-MCNC: 3.2 NG/ML (ref 0–3)
CEA SERPL-MCNC: 3.5 NG/ML (ref 0–3)
CEA SERPL-MCNC: 4.5 NG/ML (ref 0–3)
CEA SERPL-MCNC: 4.8 NG/ML (ref 0–3)
CHLORIDE SERPL-SCNC: 100 MMOL/L (ref 98–107)
CHLORIDE SERPL-SCNC: 101 MMOL/L (ref 98–107)
CHLORIDE SERPL-SCNC: 104 MMOL/L (ref 98–107)
CHLORIDE SERPL-SCNC: 105 MMOL/L (ref 98–107)
CHLORIDE SERPL-SCNC: 105 MMOL/L (ref 98–107)
CHLORIDE SERPL-SCNC: 106 MMOL/L (ref 98–107)
CHLORIDE SERPL-SCNC: 107 MMOL/L (ref 98–107)
CHLORIDE SERPL-SCNC: 108 MMOL/L (ref 98–107)
CHLORIDE SERPL-SCNC: 109 MMOL/L (ref 98–107)
CHLORIDE SERPL-SCNC: 110 MMOL/L (ref 98–107)
CHLORIDE SERPL-SCNC: 111 MMOL/L (ref 98–107)
CHLORIDE SERPL-SCNC: 111 MMOL/L (ref 98–107)
CHLORIDE SERPL-SCNC: 112 MMOL/L (ref 98–107)
CHLORIDE SERPL-SCNC: 114 MMOL/L (ref 98–107)
CHLORIDE SERPL-SCNC: 116 MMOL/L (ref 98–107)
CHLORIDE SERPL-SCNC: 119 MMOL/L (ref 98–107)
CO2 SERPL-SCNC: 18 MMOL/L (ref 21–32)
CO2 SERPL-SCNC: 18 MMOL/L (ref 21–32)
CO2 SERPL-SCNC: 20 MMOL/L (ref 21–32)
CO2 SERPL-SCNC: 21 MMOL/L (ref 21–32)
CO2 SERPL-SCNC: 22 MMOL/L (ref 21–32)
CO2 SERPL-SCNC: 22 MMOL/L (ref 21–32)
CO2 SERPL-SCNC: 23 MMOL/L (ref 21–32)
CO2 SERPL-SCNC: 25 MMOL/L (ref 21–32)
CO2 SERPL-SCNC: 26 MMOL/L (ref 21–32)
CO2 SERPL-SCNC: 27 MMOL/L (ref 21–32)
CO2 SERPL-SCNC: 27 MMOL/L (ref 23–32)
CO2 SERPL-SCNC: 28 MMOL/L (ref 21–32)
CO2 SERPL-SCNC: 28 MMOL/L (ref 23–32)
CO2 SERPL-SCNC: 29 MMOL/L (ref 21–32)
CO2 SERPL-SCNC: 29 MMOL/L (ref 21–32)
CO2 SERPL-SCNC: 29 MMOL/L (ref 23–32)
CO2 SERPL-SCNC: 30 MMOL/L (ref 23–32)
COLOR FLD: YELLOW
COLOR FLD: YELLOW
COLOR UR: ABNORMAL
COLOR UR: ABNORMAL
CREAT SERPL-MCNC: 0.66 MG/DL (ref 0.8–1.5)
CREAT SERPL-MCNC: 0.66 MG/DL (ref 0.8–1.5)
CREAT SERPL-MCNC: 0.68 MG/DL (ref 0.8–1.5)
CREAT SERPL-MCNC: 0.7 MG/DL (ref 0.8–1.5)
CREAT SERPL-MCNC: 0.71 MG/DL (ref 0.8–1.5)
CREAT SERPL-MCNC: 0.75 MG/DL (ref 0.8–1.5)
CREAT SERPL-MCNC: 0.77 MG/DL (ref 0.8–1.5)
CREAT SERPL-MCNC: 0.79 MG/DL (ref 0.8–1.5)
CREAT SERPL-MCNC: 0.8 MG/DL (ref 0.8–1.5)
CREAT SERPL-MCNC: 0.8 MG/DL (ref 0.8–1.5)
CREAT SERPL-MCNC: 0.84 MG/DL (ref 0.8–1.5)
CREAT SERPL-MCNC: 0.84 MG/DL (ref 0.8–1.5)
CREAT SERPL-MCNC: 0.85 MG/DL (ref 0.8–1.5)
CREAT SERPL-MCNC: 0.86 MG/DL (ref 0.8–1.5)
CREAT SERPL-MCNC: 0.86 MG/DL (ref 0.8–1.5)
CREAT SERPL-MCNC: 0.87 MG/DL (ref 0.8–1.5)
CREAT SERPL-MCNC: 0.88 MG/DL (ref 0.8–1.5)
CREAT SERPL-MCNC: 0.92 MG/DL (ref 0.8–1.5)
CREAT SERPL-MCNC: 0.93 MG/DL (ref 0.8–1.5)
CREAT SERPL-MCNC: 0.94 MG/DL (ref 0.8–1.5)
CREAT SERPL-MCNC: 0.94 MG/DL (ref 0.8–1.5)
CREAT SERPL-MCNC: 0.97 MG/DL (ref 0.8–1.5)
CREAT SERPL-MCNC: 0.97 MG/DL (ref 0.8–1.5)
CREAT SERPL-MCNC: 0.99 MG/DL (ref 0.8–1.5)
CREAT SERPL-MCNC: 1.01 MG/DL (ref 0.8–1.5)
CREAT SERPL-MCNC: 1.02 MG/DL (ref 0.8–1.5)
CREAT SERPL-MCNC: 1.02 MG/DL (ref 0.8–1.5)
CREAT SERPL-MCNC: 1.05 MG/DL (ref 0.8–1.5)
CREAT SERPL-MCNC: 1.06 MG/DL (ref 0.8–1.5)
CREAT SERPL-MCNC: 1.07 MG/DL (ref 0.8–1.5)
CREAT SERPL-MCNC: 1.14 MG/DL (ref 0.8–1.5)
CREAT SERPL-MCNC: 1.14 MG/DL (ref 0.8–1.5)
CREAT SERPL-MCNC: 1.19 MG/DL (ref 0.8–1.5)
CREAT SERPL-MCNC: 1.21 MG/DL (ref 0.8–1.5)
CREAT SERPL-MCNC: 1.53 MG/DL (ref 0.8–1.5)
CREAT SERPL-MCNC: 1.54 MG/DL (ref 0.8–1.5)
CREAT SERPL-MCNC: 1.91 MG/DL (ref 0.8–1.5)
CREAT SERPL-MCNC: 2.69 MG/DL (ref 0.8–1.5)
CRYSTALS URNS QL MICRO: 0 /LPF
DIAGNOSIS, 93000: NORMAL
DIFFERENTIAL METHOD BLD: ABNORMAL
EOSINOPHIL # BLD: 0 K/UL (ref 0–0.8)
EOSINOPHIL # BLD: 0 K/UL (ref 0–0.8)
EOSINOPHIL # BLD: 0.1 K/UL (ref 0–0.8)
EOSINOPHIL # BLD: 0.2 K/UL (ref 0–0.8)
EOSINOPHIL # BLD: 0.3 K/UL (ref 0–0.8)
EOSINOPHIL NFR BLD MANUAL: 2 % (ref 1–8)
EOSINOPHIL NFR BLD: 0 % (ref 0.5–7.8)
EOSINOPHIL NFR BLD: 0 % (ref 0.5–7.8)
EOSINOPHIL NFR BLD: 1 % (ref 0.5–7.8)
EOSINOPHIL NFR BLD: 2 % (ref 0.5–7.8)
EOSINOPHIL NFR BLD: 3 % (ref 0.5–7.8)
EOSINOPHIL NFR BLD: 4 % (ref 0.5–7.8)
EOSINOPHIL NFR BLD: 5 % (ref 0.5–7.8)
EOSINOPHIL NFR BLD: 5 % (ref 0.5–7.8)
EOSINOPHIL NFR BLD: 6 % (ref 0.5–7.8)
EOSINOPHIL NFR BLD: 7 % (ref 0.5–7.8)
EPI CELLS #/AREA URNS HPF: ABNORMAL /HPF
EPI CELLS #/AREA URNS HPF: NORMAL /HPF
ERYTHROCYTE [DISTWIDTH] IN BLOOD BY AUTOMATED COUNT: 14.9 % (ref 11.9–14.6)
ERYTHROCYTE [DISTWIDTH] IN BLOOD BY AUTOMATED COUNT: 15 % (ref 11.9–14.6)
ERYTHROCYTE [DISTWIDTH] IN BLOOD BY AUTOMATED COUNT: 15.1 % (ref 11.9–14.6)
ERYTHROCYTE [DISTWIDTH] IN BLOOD BY AUTOMATED COUNT: 15.1 % (ref 11.9–14.6)
ERYTHROCYTE [DISTWIDTH] IN BLOOD BY AUTOMATED COUNT: 15.4 % (ref 11.9–14.6)
ERYTHROCYTE [DISTWIDTH] IN BLOOD BY AUTOMATED COUNT: 15.7 % (ref 11.9–14.6)
ERYTHROCYTE [DISTWIDTH] IN BLOOD BY AUTOMATED COUNT: 15.8 % (ref 11.9–14.6)
ERYTHROCYTE [DISTWIDTH] IN BLOOD BY AUTOMATED COUNT: 15.9 % (ref 11.9–14.6)
ERYTHROCYTE [DISTWIDTH] IN BLOOD BY AUTOMATED COUNT: 16 % (ref 11.9–14.6)
ERYTHROCYTE [DISTWIDTH] IN BLOOD BY AUTOMATED COUNT: 16.1 % (ref 11.9–14.6)
ERYTHROCYTE [DISTWIDTH] IN BLOOD BY AUTOMATED COUNT: 16.1 % (ref 11.9–14.6)
ERYTHROCYTE [DISTWIDTH] IN BLOOD BY AUTOMATED COUNT: 16.6 % (ref 11.9–14.6)
ERYTHROCYTE [DISTWIDTH] IN BLOOD BY AUTOMATED COUNT: 16.6 % (ref 11.9–14.6)
ERYTHROCYTE [DISTWIDTH] IN BLOOD BY AUTOMATED COUNT: 17.2 % (ref 11.9–14.6)
ERYTHROCYTE [DISTWIDTH] IN BLOOD BY AUTOMATED COUNT: 17.5 % (ref 11.9–14.6)
ERYTHROCYTE [DISTWIDTH] IN BLOOD BY AUTOMATED COUNT: 17.5 % (ref 11.9–14.6)
ERYTHROCYTE [DISTWIDTH] IN BLOOD BY AUTOMATED COUNT: 18 % (ref 11.9–14.6)
ERYTHROCYTE [DISTWIDTH] IN BLOOD BY AUTOMATED COUNT: 18.1 % (ref 11.9–14.6)
ERYTHROCYTE [DISTWIDTH] IN BLOOD BY AUTOMATED COUNT: 18.2 % (ref 11.9–14.6)
ERYTHROCYTE [DISTWIDTH] IN BLOOD BY AUTOMATED COUNT: 18.3 % (ref 11.9–14.6)
ERYTHROCYTE [DISTWIDTH] IN BLOOD BY AUTOMATED COUNT: 18.7 % (ref 11.9–14.6)
ERYTHROCYTE [DISTWIDTH] IN BLOOD BY AUTOMATED COUNT: 18.8 % (ref 11.9–14.6)
ERYTHROCYTE [DISTWIDTH] IN BLOOD BY AUTOMATED COUNT: 18.9 % (ref 11.9–14.6)
ERYTHROCYTE [DISTWIDTH] IN BLOOD BY AUTOMATED COUNT: 18.9 % (ref 11.9–14.6)
ERYTHROCYTE [DISTWIDTH] IN BLOOD BY AUTOMATED COUNT: 19.4 % (ref 11.9–14.6)
ERYTHROCYTE [DISTWIDTH] IN BLOOD BY AUTOMATED COUNT: 19.5 % (ref 11.9–14.6)
ERYTHROCYTE [DISTWIDTH] IN BLOOD BY AUTOMATED COUNT: 19.6 % (ref 11.9–14.6)
ERYTHROCYTE [DISTWIDTH] IN BLOOD BY AUTOMATED COUNT: 20 % (ref 11.9–14.6)
ERYTHROCYTE [DISTWIDTH] IN BLOOD BY AUTOMATED COUNT: 20 % (ref 11.9–14.6)
ERYTHROCYTE [DISTWIDTH] IN BLOOD BY AUTOMATED COUNT: 20.1 % (ref 11.9–14.6)
FIBRINOGEN PPP-MCNC: 359 MG/DL (ref 172–437)
GLOBULIN SER CALC-MCNC: 2.6 G/DL (ref 2.3–3.5)
GLOBULIN SER CALC-MCNC: 3 G/DL (ref 2.3–3.5)
GLOBULIN SER CALC-MCNC: 3.1 G/DL (ref 2.3–3.5)
GLOBULIN SER CALC-MCNC: 3.3 G/DL (ref 2.3–3.5)
GLOBULIN SER CALC-MCNC: 3.4 G/DL (ref 2.3–3.5)
GLOBULIN SER CALC-MCNC: 3.5 G/DL (ref 2.3–3.5)
GLOBULIN SER CALC-MCNC: 3.6 G/DL
GLOBULIN SER CALC-MCNC: 3.6 G/DL (ref 2.3–3.5)
GLOBULIN SER CALC-MCNC: 3.7 G/DL
GLOBULIN SER CALC-MCNC: 3.8 G/DL (ref 2.3–3.5)
GLOBULIN SER CALC-MCNC: 3.9 G/DL (ref 2.3–3.5)
GLOBULIN SER CALC-MCNC: 3.9 G/DL (ref 2.3–3.5)
GLOBULIN SER CALC-MCNC: 4 G/DL (ref 2.3–3.5)
GLOBULIN SER CALC-MCNC: 4 G/DL (ref 2.3–3.5)
GLOBULIN SER CALC-MCNC: 4.1 G/DL (ref 2.3–3.5)
GLOBULIN SER CALC-MCNC: 4.3 G/DL (ref 2.3–3.5)
GLUCOSE FLD-MCNC: 50 MG/DL
GLUCOSE SERPL-MCNC: 101 MG/DL (ref 65–100)
GLUCOSE SERPL-MCNC: 101 MG/DL (ref 65–100)
GLUCOSE SERPL-MCNC: 106 MG/DL (ref 65–100)
GLUCOSE SERPL-MCNC: 109 MG/DL (ref 65–100)
GLUCOSE SERPL-MCNC: 112 MG/DL (ref 65–100)
GLUCOSE SERPL-MCNC: 114 MG/DL (ref 65–100)
GLUCOSE SERPL-MCNC: 115 MG/DL (ref 65–100)
GLUCOSE SERPL-MCNC: 116 MG/DL (ref 65–100)
GLUCOSE SERPL-MCNC: 116 MG/DL (ref 65–100)
GLUCOSE SERPL-MCNC: 117 MG/DL (ref 65–100)
GLUCOSE SERPL-MCNC: 118 MG/DL (ref 65–100)
GLUCOSE SERPL-MCNC: 118 MG/DL (ref 65–100)
GLUCOSE SERPL-MCNC: 119 MG/DL (ref 65–100)
GLUCOSE SERPL-MCNC: 120 MG/DL (ref 65–100)
GLUCOSE SERPL-MCNC: 123 MG/DL (ref 65–100)
GLUCOSE SERPL-MCNC: 130 MG/DL (ref 65–100)
GLUCOSE SERPL-MCNC: 130 MG/DL (ref 65–100)
GLUCOSE SERPL-MCNC: 131 MG/DL (ref 65–100)
GLUCOSE SERPL-MCNC: 132 MG/DL (ref 65–100)
GLUCOSE SERPL-MCNC: 135 MG/DL (ref 65–100)
GLUCOSE SERPL-MCNC: 146 MG/DL (ref 65–100)
GLUCOSE SERPL-MCNC: 146 MG/DL (ref 65–100)
GLUCOSE SERPL-MCNC: 150 MG/DL (ref 65–100)
GLUCOSE SERPL-MCNC: 151 MG/DL (ref 65–100)
GLUCOSE SERPL-MCNC: 160 MG/DL (ref 65–100)
GLUCOSE SERPL-MCNC: 163 MG/DL (ref 65–100)
GLUCOSE SERPL-MCNC: 189 MG/DL (ref 65–100)
GLUCOSE SERPL-MCNC: 200 MG/DL (ref 65–100)
GLUCOSE SERPL-MCNC: 66 MG/DL (ref 65–100)
GLUCOSE SERPL-MCNC: 77 MG/DL (ref 65–100)
GLUCOSE SERPL-MCNC: 81 MG/DL (ref 65–100)
GLUCOSE SERPL-MCNC: 89 MG/DL (ref 65–100)
GLUCOSE SERPL-MCNC: 90 MG/DL (ref 65–100)
GLUCOSE SERPL-MCNC: 92 MG/DL (ref 65–100)
GLUCOSE SERPL-MCNC: 99 MG/DL (ref 65–100)
GLUCOSE UR STRIP.AUTO-MCNC: 250 MG/DL
GLUCOSE UR STRIP.AUTO-MCNC: NEGATIVE MG/DL
GRAM STN SPEC: NORMAL
GRAM STN SPEC: NORMAL
HCT VFR BLD AUTO: 28.2 % (ref 41.1–50.3)
HCT VFR BLD AUTO: 28.5 % (ref 41.1–50.3)
HCT VFR BLD AUTO: 29.4 % (ref 41.1–50.3)
HCT VFR BLD AUTO: 29.4 % (ref 41.1–50.3)
HCT VFR BLD AUTO: 30.4 % (ref 41.1–50.3)
HCT VFR BLD AUTO: 30.5 % (ref 41.1–50.3)
HCT VFR BLD AUTO: 30.7 % (ref 41.1–50.3)
HCT VFR BLD AUTO: 30.7 % (ref 41.1–50.3)
HCT VFR BLD AUTO: 31.8 % (ref 41.1–50.3)
HCT VFR BLD AUTO: 31.9 % (ref 41.1–50.3)
HCT VFR BLD AUTO: 31.9 % (ref 41.1–50.3)
HCT VFR BLD AUTO: 32 % (ref 41.1–50.3)
HCT VFR BLD AUTO: 32.9 % (ref 41.1–50.3)
HCT VFR BLD AUTO: 32.9 % (ref 41.1–50.3)
HCT VFR BLD AUTO: 33.5 % (ref 41.1–50.3)
HCT VFR BLD AUTO: 33.8 % (ref 41.1–50.3)
HCT VFR BLD AUTO: 34.2 % (ref 41.1–50.3)
HCT VFR BLD AUTO: 35.2 % (ref 41.1–50.3)
HCT VFR BLD AUTO: 35.5 % (ref 41.1–50.3)
HCT VFR BLD AUTO: 35.5 % (ref 41.1–50.3)
HCT VFR BLD AUTO: 35.9 % (ref 41.1–50.3)
HCT VFR BLD AUTO: 35.9 % (ref 41.1–50.3)
HCT VFR BLD AUTO: 36.1 % (ref 41.1–50.3)
HCT VFR BLD AUTO: 36.1 % (ref 41.1–50.3)
HCT VFR BLD AUTO: 36.5 % (ref 41.1–50.3)
HCT VFR BLD AUTO: 36.6 % (ref 41.1–50.3)
HCT VFR BLD AUTO: 36.7 % (ref 41.1–50.3)
HCT VFR BLD AUTO: 36.8 % (ref 41.1–50.3)
HCT VFR BLD AUTO: 36.9 % (ref 41.1–50.3)
HCT VFR BLD AUTO: 36.9 % (ref 41.1–50.3)
HCT VFR BLD AUTO: 37.4 % (ref 41.1–50.3)
HCT VFR BLD AUTO: 37.7 % (ref 41.1–50.3)
HCT VFR BLD AUTO: 37.7 % (ref 41.1–50.3)
HCT VFR BLD AUTO: 37.8 % (ref 41.1–50.3)
HCT VFR BLD AUTO: 38.2 % (ref 41.1–50.3)
HCT VFR BLD AUTO: 38.3 % (ref 41.1–50.3)
HGB BLD-MCNC: 10.1 G/DL (ref 13.6–17.2)
HGB BLD-MCNC: 10.3 G/DL (ref 13.6–17.2)
HGB BLD-MCNC: 10.4 G/DL (ref 13.6–17.2)
HGB BLD-MCNC: 10.9 G/DL (ref 13.6–17.2)
HGB BLD-MCNC: 11 G/DL (ref 13.6–17.2)
HGB BLD-MCNC: 11.3 G/DL (ref 13.6–17.2)
HGB BLD-MCNC: 11.5 G/DL (ref 13.6–17.2)
HGB BLD-MCNC: 11.8 G/DL (ref 13.6–17.2)
HGB BLD-MCNC: 12 G/DL (ref 13.6–17.2)
HGB BLD-MCNC: 12 G/DL (ref 13.6–17.2)
HGB BLD-MCNC: 12.1 G/DL (ref 13.6–17.2)
HGB BLD-MCNC: 12.2 G/DL (ref 13.6–17.2)
HGB BLD-MCNC: 12.3 G/DL (ref 13.6–17.2)
HGB BLD-MCNC: 12.3 G/DL (ref 13.6–17.2)
HGB BLD-MCNC: 12.5 G/DL (ref 13.6–17.2)
HGB BLD-MCNC: 12.5 G/DL (ref 13.6–17.2)
HGB BLD-MCNC: 12.6 G/DL (ref 13.6–17.2)
HGB BLD-MCNC: 12.7 G/DL (ref 13.6–17.2)
HGB BLD-MCNC: 12.8 G/DL (ref 13.6–17.2)
HGB BLD-MCNC: 12.9 G/DL (ref 13.6–17.2)
HGB BLD-MCNC: 13 G/DL (ref 13.6–17.2)
HGB BLD-MCNC: 9.7 G/DL (ref 13.6–17.2)
HGB UR QL STRIP: ABNORMAL
HGB UR QL STRIP: NEGATIVE
IMM GRANULOCYTES # BLD: 0 K/UL (ref 0–0.5)
IMM GRANULOCYTES # BLD: 0.1 K/UL (ref 0–0.5)
IMM GRANULOCYTES # BLD: 0.1 K/UL (ref 0–0.5)
IMM GRANULOCYTES # BLD: 0.2 K/UL (ref 0–0.5)
IMM GRANULOCYTES # BLD: 0.2 K/UL (ref 0–0.5)
IMM GRANULOCYTES # BLD: 0.3 K/UL (ref 0–0.5)
IMM GRANULOCYTES NFR BLD AUTO: 0 % (ref 0–5)
IMM GRANULOCYTES NFR BLD AUTO: 0.2 % (ref 0–5)
IMM GRANULOCYTES NFR BLD AUTO: 0.3 % (ref 0–5)
IMM GRANULOCYTES NFR BLD AUTO: 0.4 % (ref 0–5)
IMM GRANULOCYTES NFR BLD AUTO: 0.4 % (ref 0–5)
IMM GRANULOCYTES NFR BLD AUTO: 0.7 % (ref 0–5)
IMM GRANULOCYTES NFR BLD: 0 % (ref 0–5)
IMM GRANULOCYTES NFR BLD: 0.1 % (ref 0–5)
IMM GRANULOCYTES NFR BLD: 0.2 % (ref 0–5)
IMM GRANULOCYTES NFR BLD: 0.3 % (ref 0–5)
IMM GRANULOCYTES NFR BLD: 1 % (ref 0–5)
INR PPP: 1.1 (ref 0.9–1.2)
INR PPP: 1.2 (ref 0.9–1.2)
INR PPP: 1.5 (ref 0.9–1.2)
KETONES UR QL STRIP.AUTO: 15 MG/DL
KETONES UR QL STRIP.AUTO: 40 MG/DL
LACTATE BLD-SCNC: 1.2 MMOL/L (ref 0.5–1.9)
LACTATE BLD-SCNC: 3.5 MMOL/L (ref 0.5–1.9)
LACTATE BLD-SCNC: 5.8 MMOL/L (ref 0.5–1.9)
LACTATE SERPL-SCNC: 3.3 MMOL/L (ref 0.4–2)
LACTATE SERPL-SCNC: 4.7 MMOL/L (ref 0.4–2)
LDH FLD L TO P-CCNC: 1521 U/L
LDH FLD L TO P-CCNC: 237 U/L
LEUKOCYTE ESTERASE UR QL STRIP.AUTO: ABNORMAL
LEUKOCYTE ESTERASE UR QL STRIP.AUTO: ABNORMAL
LYMPHOCYTES # BLD AUTO: 11 % (ref 13–44)
LYMPHOCYTES # BLD AUTO: 16 % (ref 13–44)
LYMPHOCYTES # BLD AUTO: 16 % (ref 13–44)
LYMPHOCYTES # BLD AUTO: 17 % (ref 13–44)
LYMPHOCYTES # BLD AUTO: 19 % (ref 13–44)
LYMPHOCYTES # BLD AUTO: 21 % (ref 13–44)
LYMPHOCYTES # BLD AUTO: 22 % (ref 13–44)
LYMPHOCYTES # BLD AUTO: 23 % (ref 13–44)
LYMPHOCYTES # BLD AUTO: 23 % (ref 13–44)
LYMPHOCYTES # BLD AUTO: 24 % (ref 13–44)
LYMPHOCYTES # BLD AUTO: 25 % (ref 13–44)
LYMPHOCYTES # BLD AUTO: 27 % (ref 13–44)
LYMPHOCYTES # BLD AUTO: 28 % (ref 13–44)
LYMPHOCYTES # BLD AUTO: 29 % (ref 13–44)
LYMPHOCYTES # BLD AUTO: 30 % (ref 13–44)
LYMPHOCYTES # BLD AUTO: 32 % (ref 13–44)
LYMPHOCYTES # BLD AUTO: 32 % (ref 13–44)
LYMPHOCYTES # BLD AUTO: 34 % (ref 13–44)
LYMPHOCYTES # BLD AUTO: 37 % (ref 13–44)
LYMPHOCYTES # BLD: 0.4 K/UL (ref 0.5–4.6)
LYMPHOCYTES # BLD: 0.4 K/UL (ref 0.5–4.6)
LYMPHOCYTES # BLD: 0.5 K/UL (ref 0.5–4.6)
LYMPHOCYTES # BLD: 0.6 K/UL (ref 0.5–4.6)
LYMPHOCYTES # BLD: 0.6 K/UL (ref 0.5–4.6)
LYMPHOCYTES # BLD: 0.7 K/UL (ref 0.5–4.6)
LYMPHOCYTES # BLD: 0.8 K/UL (ref 0.5–4.6)
LYMPHOCYTES # BLD: 0.9 K/UL (ref 0.5–4.6)
LYMPHOCYTES # BLD: 1 K/UL (ref 0.5–4.6)
LYMPHOCYTES # BLD: 1.1 K/UL (ref 0.5–4.6)
LYMPHOCYTES # BLD: 1.2 K/UL (ref 0.5–4.6)
LYMPHOCYTES # BLD: 2.1 K/UL (ref 0.5–4.6)
LYMPHOCYTES NFR BLD: 10 % (ref 13–44)
LYMPHOCYTES NFR BLD: 16 % (ref 13–44)
LYMPHOCYTES NFR BLD: 16 % (ref 13–44)
LYMPHOCYTES NFR BLD: 23 % (ref 13–44)
LYMPHOCYTES NFR BLD: 24 % (ref 13–44)
LYMPHOCYTES NFR BLD: 3 % (ref 13–44)
LYMPHOCYTES NFR BLD: 31 % (ref 13–44)
LYMPHOCYTES NFR BLD: 4 % (ref 13–44)
LYMPHOCYTES NFR BLD: 5 % (ref 13–44)
LYMPHOCYTES NFR BLD: 7 % (ref 13–44)
LYMPHOCYTES NFR FLD: 9 %
MAGNESIUM SERPL-MCNC: 1.3 MG/DL (ref 1.8–2.4)
MAGNESIUM SERPL-MCNC: 1.8 MG/DL (ref 1.8–2.4)
MAGNESIUM SERPL-MCNC: 1.8 MG/DL (ref 1.8–2.4)
MAGNESIUM SERPL-MCNC: 1.9 MG/DL (ref 1.8–2.4)
MAGNESIUM SERPL-MCNC: 1.9 MG/DL (ref 1.8–2.4)
MAGNESIUM SERPL-MCNC: 2 MG/DL (ref 1.8–2.4)
MAGNESIUM SERPL-MCNC: 2.1 MG/DL (ref 1.8–2.4)
MAGNESIUM SERPL-MCNC: 2.2 MG/DL (ref 1.8–2.4)
MAGNESIUM SERPL-MCNC: 2.3 MG/DL (ref 1.8–2.4)
MAGNESIUM SERPL-MCNC: 2.4 MG/DL (ref 1.8–2.4)
MAGNESIUM SERPL-MCNC: 2.4 MG/DL (ref 1.8–2.4)
MAGNESIUM SERPL-MCNC: 2.5 MG/DL (ref 1.8–2.4)
MCH RBC QN AUTO: 27.1 PG (ref 26.1–32.9)
MCH RBC QN AUTO: 29.2 PG (ref 26.1–32.9)
MCH RBC QN AUTO: 29.7 PG (ref 26.1–32.9)
MCH RBC QN AUTO: 30.1 PG (ref 26.1–32.9)
MCH RBC QN AUTO: 30.8 PG (ref 26.1–32.9)
MCH RBC QN AUTO: 31.1 PG (ref 26.1–32.9)
MCH RBC QN AUTO: 32.2 PG (ref 26.1–32.9)
MCH RBC QN AUTO: 33.2 PG (ref 26.1–32.9)
MCH RBC QN AUTO: 33.3 PG (ref 26.1–32.9)
MCH RBC QN AUTO: 33.6 PG (ref 26.1–32.9)
MCH RBC QN AUTO: 33.7 PG (ref 26.1–32.9)
MCH RBC QN AUTO: 33.7 PG (ref 26.1–32.9)
MCH RBC QN AUTO: 33.8 PG (ref 26.1–32.9)
MCH RBC QN AUTO: 33.8 PG (ref 26.1–32.9)
MCH RBC QN AUTO: 34 PG (ref 26.1–32.9)
MCH RBC QN AUTO: 34.1 PG (ref 26.1–32.9)
MCH RBC QN AUTO: 34.1 PG (ref 26.1–32.9)
MCH RBC QN AUTO: 34.2 PG (ref 26.1–32.9)
MCH RBC QN AUTO: 34.4 PG (ref 26.1–32.9)
MCH RBC QN AUTO: 34.6 PG (ref 26.1–32.9)
MCH RBC QN AUTO: 34.6 PG (ref 26.1–32.9)
MCH RBC QN AUTO: 34.7 PG (ref 26.1–32.9)
MCH RBC QN AUTO: 34.7 PG (ref 26.1–32.9)
MCH RBC QN AUTO: 34.8 PG (ref 26.1–32.9)
MCH RBC QN AUTO: 34.9 PG (ref 26.1–32.9)
MCH RBC QN AUTO: 35 PG (ref 26.1–32.9)
MCH RBC QN AUTO: 35 PG (ref 26.1–32.9)
MCH RBC QN AUTO: 35.1 PG (ref 26.1–32.9)
MCH RBC QN AUTO: 35.3 PG (ref 26.1–32.9)
MCH RBC QN AUTO: 35.5 PG (ref 26.1–32.9)
MCH RBC QN AUTO: 35.6 PG (ref 26.1–32.9)
MCHC RBC AUTO-ENTMCNC: 31.3 G/DL (ref 31.4–35)
MCHC RBC AUTO-ENTMCNC: 32.8 G/DL (ref 31.4–35)
MCHC RBC AUTO-ENTMCNC: 33.2 G/DL (ref 31.4–35)
MCHC RBC AUTO-ENTMCNC: 33.2 G/DL (ref 31.4–35)
MCHC RBC AUTO-ENTMCNC: 33.4 G/DL (ref 31.4–35)
MCHC RBC AUTO-ENTMCNC: 33.8 G/DL (ref 31.4–35)
MCHC RBC AUTO-ENTMCNC: 33.9 G/DL (ref 31.4–35)
MCHC RBC AUTO-ENTMCNC: 33.9 G/DL (ref 31.4–35)
MCHC RBC AUTO-ENTMCNC: 34 G/DL (ref 31.4–35)
MCHC RBC AUTO-ENTMCNC: 34 G/DL (ref 31.4–35)
MCHC RBC AUTO-ENTMCNC: 34.1 G/DL (ref 31.4–35)
MCHC RBC AUTO-ENTMCNC: 34.2 G/DL (ref 31.4–35)
MCHC RBC AUTO-ENTMCNC: 34.2 G/DL (ref 31.4–35)
MCHC RBC AUTO-ENTMCNC: 34.3 G/DL (ref 31.4–35)
MCHC RBC AUTO-ENTMCNC: 34.4 G/DL (ref 31.4–35)
MCHC RBC AUTO-ENTMCNC: 34.4 G/DL (ref 31.4–35)
MCHC RBC AUTO-ENTMCNC: 34.5 G/DL (ref 31.4–35)
MCHC RBC AUTO-ENTMCNC: 34.6 G/DL (ref 31.4–35)
MCHC RBC AUTO-ENTMCNC: 34.6 G/DL (ref 31.4–35)
MCHC RBC AUTO-ENTMCNC: 34.7 G/DL (ref 31.4–35)
MCHC RBC AUTO-ENTMCNC: 34.8 G/DL (ref 31.4–35)
MCHC RBC AUTO-ENTMCNC: 34.9 G/DL (ref 31.4–35)
MCHC RBC AUTO-ENTMCNC: 35 G/DL (ref 31.4–35)
MCHC RBC AUTO-ENTMCNC: 35 G/DL (ref 31.4–35)
MCHC RBC AUTO-ENTMCNC: 35.1 G/DL (ref 31.4–35)
MCHC RBC AUTO-ENTMCNC: 35.3 G/DL (ref 31.4–35)
MCHC RBC AUTO-ENTMCNC: 35.4 G/DL (ref 31.4–35)
MCHC RBC AUTO-ENTMCNC: 35.4 G/DL (ref 31.4–35)
MCHC RBC AUTO-ENTMCNC: 35.8 G/DL (ref 31.4–35)
MCHC RBC AUTO-ENTMCNC: 35.8 G/DL (ref 31.4–35)
MCHC RBC AUTO-ENTMCNC: 36.2 G/DL (ref 31.4–35)
MCV RBC AUTO: 100.3 FL (ref 79.6–97.8)
MCV RBC AUTO: 100.3 FL (ref 79.6–97.8)
MCV RBC AUTO: 100.8 FL (ref 79.6–97.8)
MCV RBC AUTO: 101.1 FL (ref 79.6–97.8)
MCV RBC AUTO: 101.6 FL (ref 79.6–97.8)
MCV RBC AUTO: 102 FL (ref 79.6–97.8)
MCV RBC AUTO: 102.3 FL (ref 79.6–97.8)
MCV RBC AUTO: 103.1 FL (ref 79.6–97.8)
MCV RBC AUTO: 103.4 FL (ref 79.6–97.8)
MCV RBC AUTO: 86.6 FL (ref 79.6–97.8)
MCV RBC AUTO: 89.1 FL (ref 79.6–97.8)
MCV RBC AUTO: 89.5 FL (ref 79.6–97.8)
MCV RBC AUTO: 90 FL (ref 79.6–97.8)
MCV RBC AUTO: 92 FL (ref 79.6–97.8)
MCV RBC AUTO: 92.7 FL (ref 79.6–97.8)
MCV RBC AUTO: 93 FL (ref 79.6–97.8)
MCV RBC AUTO: 93.8 FL (ref 79.6–97.8)
MCV RBC AUTO: 94 FL (ref 79.6–97.8)
MCV RBC AUTO: 95.5 FL (ref 79.6–97.8)
MCV RBC AUTO: 97.1 FL (ref 79.6–97.8)
MCV RBC AUTO: 97.2 FL (ref 79.6–97.8)
MCV RBC AUTO: 97.2 FL (ref 79.6–97.8)
MCV RBC AUTO: 97.6 FL (ref 79.6–97.8)
MCV RBC AUTO: 97.7 FL (ref 79.6–97.8)
MCV RBC AUTO: 97.7 FL (ref 79.6–97.8)
MCV RBC AUTO: 97.8 FL (ref 79.6–97.8)
MCV RBC AUTO: 98.5 FL (ref 79.6–97.8)
MCV RBC AUTO: 98.5 FL (ref 79.6–97.8)
MCV RBC AUTO: 99 FL (ref 79.6–97.8)
MCV RBC AUTO: 99 FL (ref 79.6–97.8)
MCV RBC AUTO: 99.1 FL (ref 79.6–97.8)
MCV RBC AUTO: 99.3 FL (ref 79.6–97.8)
MCV RBC AUTO: 99.7 FL (ref 79.6–97.8)
MONOCYTES # BLD: 0.2 K/UL (ref 0.1–1.3)
MONOCYTES # BLD: 0.2 K/UL (ref 0.1–1.3)
MONOCYTES # BLD: 0.3 K/UL (ref 0.1–1.3)
MONOCYTES # BLD: 0.4 K/UL (ref 0.1–1.3)
MONOCYTES # BLD: 0.5 K/UL (ref 0.1–1.3)
MONOCYTES # BLD: 0.6 K/UL (ref 0.1–1.3)
MONOCYTES # BLD: 0.7 K/UL (ref 0.1–1.3)
MONOCYTES # BLD: 0.8 K/UL (ref 0.1–1.3)
MONOCYTES # BLD: 1 K/UL (ref 0.1–1.3)
MONOCYTES # BLD: 1 K/UL (ref 0.1–1.3)
MONOCYTES # BLD: 1.1 K/UL (ref 0.1–1.3)
MONOCYTES # BLD: 1.9 K/UL (ref 0.1–1.3)
MONOCYTES NFR BLD AUTO: 10 % (ref 4–12)
MONOCYTES NFR BLD AUTO: 11 % (ref 4–12)
MONOCYTES NFR BLD AUTO: 12 % (ref 4–12)
MONOCYTES NFR BLD AUTO: 13 % (ref 4–12)
MONOCYTES NFR BLD AUTO: 13 % (ref 4–12)
MONOCYTES NFR BLD AUTO: 14 % (ref 4–12)
MONOCYTES NFR BLD AUTO: 15 % (ref 4–12)
MONOCYTES NFR BLD AUTO: 15 % (ref 4–12)
MONOCYTES NFR BLD AUTO: 16 % (ref 4–12)
MONOCYTES NFR BLD AUTO: 19 % (ref 4–12)
MONOCYTES NFR BLD AUTO: 20 % (ref 4–12)
MONOCYTES NFR BLD AUTO: 21 % (ref 4–12)
MONOCYTES NFR BLD AUTO: 29 % (ref 4–12)
MONOCYTES NFR BLD AUTO: 5 % (ref 4–12)
MONOCYTES NFR BLD AUTO: 5 % (ref 4–12)
MONOCYTES NFR BLD AUTO: 7 % (ref 4–12)
MONOCYTES NFR BLD AUTO: 8 % (ref 4–12)
MONOCYTES NFR BLD MANUAL: 6 % (ref 3–9)
MONOCYTES NFR BLD: 12 % (ref 4–12)
MONOCYTES NFR BLD: 13 % (ref 4–12)
MONOCYTES NFR BLD: 14 % (ref 4–12)
MONOCYTES NFR BLD: 17 % (ref 4–12)
MONOCYTES NFR BLD: 21 % (ref 4–12)
MONOCYTES NFR BLD: 4 % (ref 4–12)
MONOCYTES NFR BLD: 5 % (ref 4–12)
MONOCYTES NFR BLD: 6 % (ref 4–12)
MONOCYTES NFR BLD: 6 % (ref 4–12)
MONOCYTES NFR BLD: 7 % (ref 4–12)
MONOCYTES NFR BLD: 7 % (ref 4–12)
MUCOUS THREADS URNS QL MICRO: 0 /LPF
NEUTROPHILS NFR FLD: 100 %
NEUTROPHILS NFR FLD: 91 %
NEUTS SEG # BLD: 0.6 K/UL (ref 1.7–8.2)
NEUTS SEG # BLD: 1.1 K/UL (ref 1.7–8.2)
NEUTS SEG # BLD: 1.1 K/UL (ref 1.7–8.2)
NEUTS SEG # BLD: 1.2 K/UL (ref 1.7–8.2)
NEUTS SEG # BLD: 1.4 K/UL (ref 1.7–8.2)
NEUTS SEG # BLD: 1.5 K/UL (ref 1.7–8.2)
NEUTS SEG # BLD: 1.5 K/UL (ref 1.7–8.2)
NEUTS SEG # BLD: 1.7 K/UL (ref 1.7–8.2)
NEUTS SEG # BLD: 1.8 K/UL (ref 1.7–8.2)
NEUTS SEG # BLD: 1.9 K/UL (ref 1.7–8.2)
NEUTS SEG # BLD: 10.2 K/UL (ref 1.7–8.2)
NEUTS SEG # BLD: 11.2 K/UL (ref 1.7–8.2)
NEUTS SEG # BLD: 12.6 K/UL (ref 1.7–8.2)
NEUTS SEG # BLD: 15.2 K/UL (ref 1.7–8.2)
NEUTS SEG # BLD: 2 K/UL (ref 1.7–8.2)
NEUTS SEG # BLD: 2 K/UL (ref 1.7–8.2)
NEUTS SEG # BLD: 2.1 K/UL (ref 1.7–8.2)
NEUTS SEG # BLD: 2.5 K/UL (ref 1.7–8.2)
NEUTS SEG # BLD: 2.5 K/UL (ref 1.7–8.2)
NEUTS SEG # BLD: 21.7 K/UL (ref 1.7–8.2)
NEUTS SEG # BLD: 22.1 K/UL (ref 1.7–8.2)
NEUTS SEG # BLD: 25.2 K/UL (ref 1.7–8.2)
NEUTS SEG # BLD: 3.1 K/UL (ref 1.7–8.2)
NEUTS SEG # BLD: 3.8 K/UL (ref 1.7–8.2)
NEUTS SEG # BLD: 4.1 K/UL (ref 1.7–8.2)
NEUTS SEG # BLD: 4.1 K/UL (ref 1.7–8.2)
NEUTS SEG # BLD: 4.4 K/UL (ref 1.7–8.2)
NEUTS SEG # BLD: 4.7 K/UL (ref 1.7–8.2)
NEUTS SEG # BLD: 5.8 K/UL (ref 1.7–8.2)
NEUTS SEG # BLD: 5.8 K/UL (ref 1.7–8.2)
NEUTS SEG # BLD: 6.1 K/UL (ref 1.7–8.2)
NEUTS SEG # BLD: 7.8 K/UL (ref 1.7–8.2)
NEUTS SEG # BLD: 8 K/UL (ref 1.7–8.2)
NEUTS SEG NFR BLD AUTO: 31 % (ref 43–78)
NEUTS SEG NFR BLD AUTO: 39 % (ref 43–78)
NEUTS SEG NFR BLD AUTO: 42 % (ref 43–78)
NEUTS SEG NFR BLD AUTO: 48 % (ref 43–78)
NEUTS SEG NFR BLD AUTO: 52 % (ref 43–78)
NEUTS SEG NFR BLD AUTO: 55 % (ref 43–78)
NEUTS SEG NFR BLD AUTO: 55 % (ref 43–78)
NEUTS SEG NFR BLD AUTO: 56 % (ref 43–78)
NEUTS SEG NFR BLD AUTO: 57 % (ref 43–78)
NEUTS SEG NFR BLD AUTO: 58 % (ref 43–78)
NEUTS SEG NFR BLD AUTO: 58 % (ref 43–78)
NEUTS SEG NFR BLD AUTO: 59 % (ref 43–78)
NEUTS SEG NFR BLD AUTO: 60 % (ref 43–78)
NEUTS SEG NFR BLD AUTO: 62 % (ref 43–78)
NEUTS SEG NFR BLD AUTO: 65 % (ref 43–78)
NEUTS SEG NFR BLD AUTO: 67 % (ref 43–78)
NEUTS SEG NFR BLD AUTO: 70 % (ref 43–78)
NEUTS SEG NFR BLD AUTO: 75 % (ref 43–78)
NEUTS SEG NFR BLD AUTO: 84 % (ref 43–78)
NEUTS SEG NFR BLD MANUAL: 92 % (ref 47–75)
NEUTS SEG NFR BLD: 47 % (ref 43–78)
NEUTS SEG NFR BLD: 53 % (ref 43–78)
NEUTS SEG NFR BLD: 57 % (ref 43–78)
NEUTS SEG NFR BLD: 69 % (ref 43–78)
NEUTS SEG NFR BLD: 76 % (ref 43–78)
NEUTS SEG NFR BLD: 77 % (ref 43–78)
NEUTS SEG NFR BLD: 82 % (ref 43–78)
NEUTS SEG NFR BLD: 87 % (ref 43–78)
NEUTS SEG NFR BLD: 88 % (ref 43–78)
NEUTS SEG NFR BLD: 90 % (ref 43–78)
NEUTS SEG NFR BLD: 91 % (ref 43–78)
NEUTS SEG NFR BLD: 92 % (ref 43–78)
NITRITE UR QL STRIP.AUTO: POSITIVE
NITRITE UR QL STRIP.AUTO: POSITIVE
NRBC # BLD: 0 K/UL (ref 0–0.2)
NRBC # BLD: 0.01 K/UL (ref 0–0.2)
NUC CELL # FLD: 1165 /CU MM
NUC CELL # FLD: 1333 /CU MM
P-R INTERVAL, ECG05: 172 MS
PH UR STRIP: 5 [PH] (ref 5–9)
PH UR STRIP: 5 [PH] (ref 5–9)
PHOSPHATE SERPL-MCNC: 2.9 MG/DL (ref 2.3–3.7)
PHOSPHATE SERPL-MCNC: 3.1 MG/DL (ref 2.3–3.7)
PHOSPHATE SERPL-MCNC: 3.4 MG/DL (ref 2.3–3.7)
PHOSPHATE SERPL-MCNC: 3.9 MG/DL (ref 2.3–3.7)
PHOSPHATE SERPL-MCNC: 4 MG/DL (ref 2.3–3.7)
PHOSPHATE SERPL-MCNC: 4.5 MG/DL (ref 2.3–3.7)
PLATELET # BLD AUTO: 102 K/UL (ref 150–450)
PLATELET # BLD AUTO: 104 K/UL (ref 150–450)
PLATELET # BLD AUTO: 114 K/UL (ref 150–450)
PLATELET # BLD AUTO: 114 K/UL (ref 150–450)
PLATELET # BLD AUTO: 117 K/UL (ref 150–450)
PLATELET # BLD AUTO: 126 K/UL (ref 150–450)
PLATELET # BLD AUTO: 127 K/UL (ref 150–450)
PLATELET # BLD AUTO: 128 K/UL (ref 150–450)
PLATELET # BLD AUTO: 130 K/UL (ref 150–450)
PLATELET # BLD AUTO: 137 K/UL (ref 150–450)
PLATELET # BLD AUTO: 138 K/UL (ref 150–450)
PLATELET # BLD AUTO: 140 K/UL (ref 150–450)
PLATELET # BLD AUTO: 143 K/UL (ref 150–450)
PLATELET # BLD AUTO: 149 K/UL (ref 150–450)
PLATELET # BLD AUTO: 149 K/UL (ref 150–450)
PLATELET # BLD AUTO: 150 K/UL (ref 150–450)
PLATELET # BLD AUTO: 157 K/UL (ref 150–450)
PLATELET # BLD AUTO: 162 K/UL (ref 150–450)
PLATELET # BLD AUTO: 168 K/UL (ref 150–450)
PLATELET # BLD AUTO: 171 K/UL (ref 150–450)
PLATELET # BLD AUTO: 174 K/UL (ref 150–450)
PLATELET # BLD AUTO: 193 K/UL (ref 150–450)
PLATELET # BLD AUTO: 208 K/UL (ref 150–450)
PLATELET # BLD AUTO: 73 K/UL (ref 150–450)
PLATELET # BLD AUTO: 84 K/UL (ref 150–450)
PLATELET # BLD AUTO: 86 K/UL (ref 150–450)
PLATELET # BLD AUTO: 89 K/UL (ref 150–450)
PLATELET # BLD AUTO: 92 K/UL (ref 150–450)
PLATELET # BLD AUTO: 93 K/UL (ref 150–450)
PLATELET # BLD AUTO: 94 K/UL (ref 150–450)
PLATELET # BLD AUTO: 95 K/UL (ref 150–450)
PLATELET # BLD AUTO: 95 K/UL (ref 150–450)
PLATELET # BLD AUTO: 97 K/UL (ref 150–450)
PLATELET COMMENTS,PCOM: ADEQUATE
PMV BLD AUTO: 10.2 FL (ref 10.8–14.1)
PMV BLD AUTO: 10.3 FL (ref 10.8–14.1)
PMV BLD AUTO: 10.4 FL (ref 10.8–14.1)
PMV BLD AUTO: 10.5 FL (ref 10.8–14.1)
PMV BLD AUTO: 8.3 FL (ref 10.8–14.1)
PMV BLD AUTO: 8.4 FL (ref 10.8–14.1)
PMV BLD AUTO: 8.5 FL (ref 10.8–14.1)
PMV BLD AUTO: 8.6 FL (ref 10.8–14.1)
PMV BLD AUTO: 8.7 FL (ref 10.8–14.1)
PMV BLD AUTO: 8.8 FL (ref 10.8–14.1)
PMV BLD AUTO: 8.9 FL (ref 10.8–14.1)
PMV BLD AUTO: 9 FL (ref 10.8–14.1)
PMV BLD AUTO: 9 FL (ref 10.8–14.1)
PMV BLD AUTO: 9.1 FL (ref 10.8–14.1)
PMV BLD AUTO: 9.1 FL (ref 10.8–14.1)
PMV BLD AUTO: 9.2 FL (ref 10.8–14.1)
PMV BLD AUTO: 9.2 FL (ref 10.8–14.1)
PMV BLD AUTO: 9.3 FL (ref 10.8–14.1)
PMV BLD AUTO: 9.4 FL (ref 10.8–14.1)
PMV BLD AUTO: 9.6 FL (ref 10.8–14.1)
PMV BLD AUTO: 9.6 FL (ref 10.8–14.1)
PMV BLD AUTO: 9.7 FL (ref 10.8–14.1)
PMV BLD AUTO: 9.7 FL (ref 10.8–14.1)
PMV BLD AUTO: 9.8 FL (ref 10.8–14.1)
PMV BLD AUTO: 9.8 FL (ref 10.8–14.1)
POTASSIUM SERPL-SCNC: 3.4 MMOL/L (ref 3.5–5.1)
POTASSIUM SERPL-SCNC: 3.5 MMOL/L (ref 3.5–5.1)
POTASSIUM SERPL-SCNC: 3.5 MMOL/L (ref 3.5–5.1)
POTASSIUM SERPL-SCNC: 3.6 MMOL/L (ref 3.5–5.1)
POTASSIUM SERPL-SCNC: 3.8 MMOL/L (ref 3.5–5.1)
POTASSIUM SERPL-SCNC: 3.9 MMOL/L (ref 3.5–5.1)
POTASSIUM SERPL-SCNC: 4 MMOL/L (ref 3.5–5.1)
POTASSIUM SERPL-SCNC: 4.1 MMOL/L (ref 3.5–5.1)
POTASSIUM SERPL-SCNC: 4.2 MMOL/L (ref 3.5–5.1)
POTASSIUM SERPL-SCNC: 4.3 MMOL/L (ref 3.5–5.1)
POTASSIUM SERPL-SCNC: 4.3 MMOL/L (ref 3.5–5.1)
POTASSIUM SERPL-SCNC: 4.4 MMOL/L (ref 3.5–5.1)
POTASSIUM SERPL-SCNC: 4.5 MMOL/L (ref 3.5–5.1)
POTASSIUM SERPL-SCNC: 4.6 MMOL/L (ref 3.5–5.1)
POTASSIUM SERPL-SCNC: 4.6 MMOL/L (ref 3.5–5.1)
POTASSIUM SERPL-SCNC: 4.7 MMOL/L (ref 3.5–5.1)
POTASSIUM SERPL-SCNC: 4.7 MMOL/L (ref 3.5–5.1)
POTASSIUM SERPL-SCNC: 4.8 MMOL/L (ref 3.5–5.1)
POTASSIUM SERPL-SCNC: 4.9 MMOL/L (ref 3.5–5.1)
POTASSIUM SERPL-SCNC: 5.1 MMOL/L (ref 3.5–5.1)
POTASSIUM SERPL-SCNC: 5.5 MMOL/L (ref 3.5–5.1)
PROT FLD-MCNC: 1 G/DL
PROT FLD-MCNC: 3.1 G/DL
PROT SERPL-MCNC: 4.6 G/DL (ref 6.3–8.2)
PROT SERPL-MCNC: 4.6 G/DL (ref 6.3–8.2)
PROT SERPL-MCNC: 4.8 G/DL (ref 6.3–8.2)
PROT SERPL-MCNC: 4.8 G/DL (ref 6.3–8.2)
PROT SERPL-MCNC: 4.9 G/DL (ref 6.3–8.2)
PROT SERPL-MCNC: 5 G/DL (ref 6.3–8.2)
PROT SERPL-MCNC: 5 G/DL (ref 6.3–8.2)
PROT SERPL-MCNC: 5.3 G/DL (ref 6.3–8.2)
PROT SERPL-MCNC: 5.5 G/DL (ref 6.3–8.2)
PROT SERPL-MCNC: 5.8 G/DL (ref 6.3–8.2)
PROT SERPL-MCNC: 5.9 G/DL (ref 6.3–8.2)
PROT SERPL-MCNC: 6.1 G/DL (ref 6.3–8.2)
PROT SERPL-MCNC: 6.3 G/DL (ref 6.3–8.2)
PROT SERPL-MCNC: 6.4 G/DL (ref 6.3–8.2)
PROT SERPL-MCNC: 6.5 G/DL (ref 6.3–8.2)
PROT SERPL-MCNC: 6.5 G/DL (ref 6.3–8.2)
PROT SERPL-MCNC: 6.6 G/DL (ref 6.3–8.2)
PROT SERPL-MCNC: 6.7 G/DL (ref 6.3–8.2)
PROT SERPL-MCNC: 6.7 G/DL (ref 6.3–8.2)
PROT SERPL-MCNC: 7 G/DL (ref 6.3–8.2)
PROT SERPL-MCNC: 7.1 G/DL (ref 6.3–8.2)
PROT SERPL-MCNC: 7.2 G/DL (ref 6.3–8.2)
PROT SERPL-MCNC: 7.3 G/DL (ref 6.3–8.2)
PROT UR STRIP-MCNC: 30 MG/DL
PROT UR STRIP-MCNC: 300 MG/DL
PROT UR-MCNC: 10 MG/DL
PROT UR-MCNC: 12 MG/DL
PROT UR-MCNC: 14 MG/DL
PROT UR-MCNC: 15 MG/DL
PROT UR-MCNC: 15 MG/DL
PROT UR-MCNC: 16 MG/DL
PROT UR-MCNC: 18 MG/DL
PROT UR-MCNC: 25 MG/DL
PROT UR-MCNC: 27 MG/DL
PROT UR-MCNC: 7 MG/DL
PROT UR-MCNC: 9 MG/DL
PROT UR-MCNC: 9 MG/DL
PROT UR-MCNC: <5 MG/DL
PROTHROMBIN TIME: 12 SEC (ref 9.6–12)
PROTHROMBIN TIME: 13 SEC (ref 9.6–12)
PROTHROMBIN TIME: 16.3 SEC (ref 9.6–12)
Q-T INTERVAL, ECG07: 292 MS
QRS DURATION, ECG06: 90 MS
QTC CALCULATION (BEZET), ECG08: 414 MS
RBC # BLD AUTO: 2.84 M/UL (ref 4.23–5.67)
RBC # BLD AUTO: 2.97 M/UL (ref 4.23–5.67)
RBC # BLD AUTO: 3.01 M/UL (ref 4.23–5.67)
RBC # BLD AUTO: 3.04 M/UL (ref 4.23–5.67)
RBC # BLD AUTO: 3.06 M/UL (ref 4.23–5.67)
RBC # BLD AUTO: 3.07 M/UL (ref 4.23–5.67)
RBC # BLD AUTO: 3.14 M/UL (ref 4.23–5.67)
RBC # BLD AUTO: 3.16 M/UL (ref 4.23–5.67)
RBC # BLD AUTO: 3.2 M/UL (ref 4.23–5.67)
RBC # BLD AUTO: 3.23 M/UL (ref 4.23–5.67)
RBC # BLD AUTO: 3.28 M/UL (ref 4.23–5.67)
RBC # BLD AUTO: 3.3 M/UL (ref 4.23–5.67)
RBC # BLD AUTO: 3.3 M/UL (ref 4.23–5.67)
RBC # BLD AUTO: 3.35 M/UL (ref 4.23–5.67)
RBC # BLD AUTO: 3.38 M/UL (ref 4.23–5.67)
RBC # BLD AUTO: 3.43 M/UL (ref 4.23–5.67)
RBC # BLD AUTO: 3.44 M/UL (ref 4.23–5.67)
RBC # BLD AUTO: 3.48 M/UL (ref 4.23–5.67)
RBC # BLD AUTO: 3.5 M/UL (ref 4.23–5.67)
RBC # BLD AUTO: 3.52 M/UL (ref 4.23–5.67)
RBC # BLD AUTO: 3.57 M/UL (ref 4.23–5.67)
RBC # BLD AUTO: 3.61 M/UL (ref 4.23–5.67)
RBC # BLD AUTO: 3.63 M/UL (ref 4.23–5.67)
RBC # BLD AUTO: 3.67 M/UL (ref 4.23–5.67)
RBC # BLD AUTO: 3.76 M/UL (ref 4.23–5.67)
RBC # BLD AUTO: 3.77 M/UL (ref 4.23–5.67)
RBC # BLD AUTO: 3.82 M/UL (ref 4.23–5.67)
RBC # BLD AUTO: 3.85 M/UL (ref 4.23–5.67)
RBC # BLD AUTO: 3.86 M/UL (ref 4.23–5.67)
RBC # BLD AUTO: 3.86 M/UL (ref 4.23–5.67)
RBC # BLD AUTO: 4.13 M/UL (ref 4.23–5.67)
RBC # BLD AUTO: 4.14 M/UL (ref 4.23–5.67)
RBC # BLD AUTO: 4.17 M/UL (ref 4.23–5.67)
RBC # BLD AUTO: 4.19 M/UL (ref 4.23–5.67)
RBC # BLD AUTO: 4.27 M/UL (ref 4.23–5.67)
RBC # FLD: NORMAL /CU MM
RBC # FLD: NORMAL /CU MM
RBC #/AREA URNS HPF: >100 /HPF
RBC #/AREA URNS HPF: ABNORMAL /HPF
RBC MORPH BLD: ABNORMAL
SERVICE CMNT-IMP: NORMAL
SODIUM SERPL-SCNC: 134 MMOL/L (ref 136–145)
SODIUM SERPL-SCNC: 137 MMOL/L (ref 136–145)
SODIUM SERPL-SCNC: 138 MMOL/L (ref 136–145)
SODIUM SERPL-SCNC: 139 MMOL/L (ref 136–145)
SODIUM SERPL-SCNC: 140 MMOL/L (ref 136–145)
SODIUM SERPL-SCNC: 141 MMOL/L (ref 136–145)
SODIUM SERPL-SCNC: 142 MMOL/L (ref 136–145)
SODIUM SERPL-SCNC: 143 MMOL/L (ref 136–145)
SODIUM SERPL-SCNC: 143 MMOL/L (ref 136–145)
SODIUM SERPL-SCNC: 147 MMOL/L (ref 136–145)
SODIUM SERPL-SCNC: 148 MMOL/L (ref 136–145)
SODIUM SERPL-SCNC: 149 MMOL/L (ref 136–145)
SP GR UR REFRACTOMETRY: 1.03 (ref 1–1.02)
SP GR UR REFRACTOMETRY: 1.03 (ref 1–1.02)
SPECIMEN EXP DATE BLD: NORMAL
SPECIMEN SOURCE FLD: NORMAL
TRIGL SERPL-MCNC: 0 MG/DL (ref 35–150)
TRIGL SERPL-MCNC: 134 MG/DL (ref 35–150)
TRIGL SERPL-MCNC: 162 MG/DL (ref 35–150)
TROPONIN I BLD-MCNC: 0.01 NG/ML (ref 0.02–0.05)
UROBILINOGEN UR QL STRIP.AUTO: 0.2 EU/DL (ref 0.2–1)
UROBILINOGEN UR QL STRIP.AUTO: 0.2 EU/DL (ref 0.2–1)
VANCOMYCIN TROUGH SERPL-MCNC: 12.1 UG/ML (ref 5–20)
VENTRICULAR RATE, ECG03: 121 BPM
WBC # BLD AUTO: 11.2 K/UL (ref 4.3–11.1)
WBC # BLD AUTO: 12.4 K/UL (ref 4.3–11.1)
WBC # BLD AUTO: 13.8 K/UL (ref 4.3–11.1)
WBC # BLD AUTO: 16.8 K/UL (ref 4.3–11.1)
WBC # BLD AUTO: 2.1 K/UL (ref 4.3–11.1)
WBC # BLD AUTO: 2.3 K/UL (ref 4.3–11.1)
WBC # BLD AUTO: 2.6 K/UL (ref 4.3–11.1)
WBC # BLD AUTO: 2.7 K/UL (ref 4.3–11.1)
WBC # BLD AUTO: 2.9 K/UL (ref 4.3–11.1)
WBC # BLD AUTO: 24 K/UL (ref 4.3–11.1)
WBC # BLD AUTO: 24.3 K/UL (ref 4.3–11.1)
WBC # BLD AUTO: 28.7 K/UL (ref 4.3–11.1)
WBC # BLD AUTO: 3 K/UL (ref 4.3–11.1)
WBC # BLD AUTO: 3 K/UL (ref 4.3–11.1)
WBC # BLD AUTO: 3.1 K/UL (ref 4.3–11.1)
WBC # BLD AUTO: 3.2 K/UL (ref 4.3–11.1)
WBC # BLD AUTO: 3.3 K/UL (ref 4.3–11.1)
WBC # BLD AUTO: 3.4 K/UL (ref 4.3–11.1)
WBC # BLD AUTO: 3.4 K/UL (ref 4.3–11.1)
WBC # BLD AUTO: 3.6 K/UL (ref 4.3–11.1)
WBC # BLD AUTO: 4 K/UL (ref 4.3–11.1)
WBC # BLD AUTO: 4.4 K/UL (ref 4.3–11.1)
WBC # BLD AUTO: 4.8 K/UL (ref 4.3–11.1)
WBC # BLD AUTO: 4.9 K/UL (ref 4.3–11.1)
WBC # BLD AUTO: 5.1 K/UL (ref 4.3–11.1)
WBC # BLD AUTO: 6.1 K/UL (ref 4.3–11.1)
WBC # BLD AUTO: 6.6 K/UL (ref 4.3–11.1)
WBC # BLD AUTO: 6.8 K/UL (ref 4.3–11.1)
WBC # BLD AUTO: 7.5 K/UL (ref 4.3–11.1)
WBC # BLD AUTO: 7.6 K/UL (ref 4.3–11.1)
WBC # BLD AUTO: 7.6 K/UL (ref 4.3–11.1)
WBC # BLD AUTO: 8.8 K/UL (ref 4.3–11.1)
WBC # BLD AUTO: 9.9 K/UL (ref 4.3–11.1)
WBC MORPH BLD: ABNORMAL
WBC URNS QL MICRO: NORMAL /HPF

## 2017-01-01 PROCEDURE — 96417 CHEMO IV INFUS EACH ADDL SEQ: CPT

## 2017-01-01 PROCEDURE — 84100 ASSAY OF PHOSPHORUS: CPT | Performed by: INTERNAL MEDICINE

## 2017-01-01 PROCEDURE — 80053 COMPREHEN METABOLIC PANEL: CPT | Performed by: INTERNAL MEDICINE

## 2017-01-01 PROCEDURE — 65270000029 HC RM PRIVATE

## 2017-01-01 PROCEDURE — 74011250636 HC RX REV CODE- 250/636

## 2017-01-01 PROCEDURE — 77030004944

## 2017-01-01 PROCEDURE — 85730 THROMBOPLASTIN TIME PARTIAL: CPT | Performed by: NURSE PRACTITIONER

## 2017-01-01 PROCEDURE — 84100 ASSAY OF PHOSPHORUS: CPT | Performed by: NURSE PRACTITIONER

## 2017-01-01 PROCEDURE — 96368 THER/DIAG CONCURRENT INF: CPT

## 2017-01-01 PROCEDURE — 74011250636 HC RX REV CODE- 250/636: Performed by: NURSE PRACTITIONER

## 2017-01-01 PROCEDURE — 85025 COMPLETE CBC W/AUTO DIFF WBC: CPT | Performed by: INTERNAL MEDICINE

## 2017-01-01 PROCEDURE — 77030018798 HC PMP KT ENTRL FED COVD -A

## 2017-01-01 PROCEDURE — 83735 ASSAY OF MAGNESIUM: CPT | Performed by: INTERNAL MEDICINE

## 2017-01-01 PROCEDURE — 74011000258 HC RX REV CODE- 258: Performed by: INTERNAL MEDICINE

## 2017-01-01 PROCEDURE — 96523 IRRIG DRUG DELIVERY DEVICE: CPT

## 2017-01-01 PROCEDURE — 36591 DRAW BLOOD OFF VENOUS DEVICE: CPT

## 2017-01-01 PROCEDURE — 80053 COMPREHEN METABOLIC PANEL: CPT | Performed by: EMERGENCY MEDICINE

## 2017-01-01 PROCEDURE — 99211 OFF/OP EST MAY X REQ PHY/QHP: CPT

## 2017-01-01 PROCEDURE — 71010 XR CHEST PORT: CPT

## 2017-01-01 PROCEDURE — 74011250637 HC RX REV CODE- 250/637: Performed by: NURSE PRACTITIONER

## 2017-01-01 PROCEDURE — 83880 ASSAY OF NATRIURETIC PEPTIDE: CPT | Performed by: INTERNAL MEDICINE

## 2017-01-01 PROCEDURE — 96375 TX/PRO/DX INJ NEW DRUG ADDON: CPT

## 2017-01-01 PROCEDURE — 96411 CHEMO IV PUSH ADDL DRUG: CPT

## 2017-01-01 PROCEDURE — 74011250636 HC RX REV CODE- 250/636: Performed by: INTERNAL MEDICINE

## 2017-01-01 PROCEDURE — 85014 HEMATOCRIT: CPT | Performed by: NURSE PRACTITIONER

## 2017-01-01 PROCEDURE — 74011250637 HC RX REV CODE- 250/637: Performed by: INTERNAL MEDICINE

## 2017-01-01 PROCEDURE — 85018 HEMOGLOBIN: CPT | Performed by: NURSE PRACTITIONER

## 2017-01-01 PROCEDURE — 77030036933 HC BRSH RX CYTO WREGD BSC -C: Performed by: INTERNAL MEDICINE

## 2017-01-01 PROCEDURE — C9113 INJ PANTOPRAZOLE SODIUM, VIA: HCPCS | Performed by: NURSE PRACTITIONER

## 2017-01-01 PROCEDURE — 85025 COMPLETE CBC W/AUTO DIFF WBC: CPT | Performed by: NURSE PRACTITIONER

## 2017-01-01 PROCEDURE — 86870 RBC ANTIBODY IDENTIFICATION: CPT | Performed by: NURSE PRACTITIONER

## 2017-01-01 PROCEDURE — 77030008703 HC TU ET UNCUF COVD -A: Performed by: ANESTHESIOLOGY

## 2017-01-01 PROCEDURE — 74011636320 HC RX REV CODE- 636/320: Performed by: INTERNAL MEDICINE

## 2017-01-01 PROCEDURE — 77030011640 HC PAD GRND REM COVD -A: Performed by: INTERNAL MEDICINE

## 2017-01-01 PROCEDURE — 0DJ08ZZ INSPECTION OF UPPER INTESTINAL TRACT, VIA NATURAL OR ARTIFICIAL OPENING ENDOSCOPIC: ICD-10-PCS | Performed by: INTERNAL MEDICINE

## 2017-01-01 PROCEDURE — 74011000250 HC RX REV CODE- 250: Performed by: INTERNAL MEDICINE

## 2017-01-01 PROCEDURE — 87040 BLOOD CULTURE FOR BACTERIA: CPT | Performed by: NURSE PRACTITIONER

## 2017-01-01 PROCEDURE — 74011000258 HC RX REV CODE- 258: Performed by: NURSE PRACTITIONER

## 2017-01-01 PROCEDURE — 85384 FIBRINOGEN ACTIVITY: CPT | Performed by: NURSE PRACTITIONER

## 2017-01-01 PROCEDURE — 74177 CT ABD & PELVIS W/CONTRAST: CPT

## 2017-01-01 PROCEDURE — 96365 THER/PROPH/DIAG IV INF INIT: CPT | Performed by: EMERGENCY MEDICINE

## 2017-01-01 PROCEDURE — 87116 MYCOBACTERIA CULTURE: CPT | Performed by: INTERNAL MEDICINE

## 2017-01-01 PROCEDURE — 96372 THER/PROPH/DIAG INJ SC/IM: CPT

## 2017-01-01 PROCEDURE — 84156 ASSAY OF PROTEIN URINE: CPT | Performed by: INTERNAL MEDICINE

## 2017-01-01 PROCEDURE — 96413 CHEMO IV INFUSION 1 HR: CPT

## 2017-01-01 PROCEDURE — 84478 ASSAY OF TRIGLYCERIDES: CPT | Performed by: INTERNAL MEDICINE

## 2017-01-01 PROCEDURE — 97530 THERAPEUTIC ACTIVITIES: CPT

## 2017-01-01 PROCEDURE — 77030003560 HC NDL HUBR BARD -A

## 2017-01-01 PROCEDURE — 84484 ASSAY OF TROPONIN QUANT: CPT

## 2017-01-01 PROCEDURE — 94760 N-INVAS EAR/PLS OXIMETRY 1: CPT

## 2017-01-01 PROCEDURE — 88305 TISSUE EXAM BY PATHOLOGIST: CPT | Performed by: NURSE PRACTITIONER

## 2017-01-01 PROCEDURE — 77010033678 HC OXYGEN DAILY

## 2017-01-01 PROCEDURE — 76775 US EXAM ABDO BACK WALL LIM: CPT

## 2017-01-01 PROCEDURE — 88112 CYTOPATH CELL ENHANCE TECH: CPT | Performed by: NURSE PRACTITIONER

## 2017-01-01 PROCEDURE — 84157 ASSAY OF PROTEIN OTHER: CPT | Performed by: INTERNAL MEDICINE

## 2017-01-01 PROCEDURE — 83735 ASSAY OF MAGNESIUM: CPT | Performed by: NURSE PRACTITIONER

## 2017-01-01 PROCEDURE — 81001 URINALYSIS AUTO W/SCOPE: CPT | Performed by: EMERGENCY MEDICINE

## 2017-01-01 PROCEDURE — 97535 SELF CARE MNGMENT TRAINING: CPT

## 2017-01-01 PROCEDURE — 99233 SBSQ HOSP IP/OBS HIGH 50: CPT | Performed by: INTERNAL MEDICINE

## 2017-01-01 PROCEDURE — 80053 COMPREHEN METABOLIC PANEL: CPT | Performed by: NURSE PRACTITIONER

## 2017-01-01 PROCEDURE — 80048 BASIC METABOLIC PNL TOTAL CA: CPT | Performed by: NURSE PRACTITIONER

## 2017-01-01 PROCEDURE — 77030032490 HC SLV COMPR SCD KNE COVD -B

## 2017-01-01 PROCEDURE — C2625 STENT, NON-COR, TEM W/DEL SY: HCPCS | Performed by: INTERNAL MEDICINE

## 2017-01-01 PROCEDURE — 74011250636 HC RX REV CODE- 250/636: Performed by: ANESTHESIOLOGY

## 2017-01-01 PROCEDURE — 86900 BLOOD TYPING SEROLOGIC ABO: CPT | Performed by: NURSE PRACTITIONER

## 2017-01-01 PROCEDURE — 74011000250 HC RX REV CODE- 250: Performed by: NURSE PRACTITIONER

## 2017-01-01 PROCEDURE — 83615 LACTATE (LD) (LDH) ENZYME: CPT | Performed by: NURSE PRACTITIONER

## 2017-01-01 PROCEDURE — 76604 US EXAM CHEST: CPT | Performed by: INTERNAL MEDICINE

## 2017-01-01 PROCEDURE — 81015 MICROSCOPIC EXAM OF URINE: CPT | Performed by: EMERGENCY MEDICINE

## 2017-01-01 PROCEDURE — 85610 PROTHROMBIN TIME: CPT | Performed by: NURSE PRACTITIONER

## 2017-01-01 PROCEDURE — 82378 CARCINOEMBRYONIC ANTIGEN: CPT | Performed by: INTERNAL MEDICINE

## 2017-01-01 PROCEDURE — 76060000033 HC ANESTHESIA 1 TO 1.5 HR: Performed by: INTERNAL MEDICINE

## 2017-01-01 PROCEDURE — 87076 CULTURE ANAEROBE IDENT EACH: CPT

## 2017-01-01 PROCEDURE — 76705 ECHO EXAM OF ABDOMEN: CPT

## 2017-01-01 PROCEDURE — 87153 DNA/RNA SEQUENCING: CPT

## 2017-01-01 PROCEDURE — 82140 ASSAY OF AMMONIA: CPT | Performed by: EMERGENCY MEDICINE

## 2017-01-01 PROCEDURE — 96361 HYDRATE IV INFUSION ADD-ON: CPT

## 2017-01-01 PROCEDURE — C1729 CATH, DRAINAGE: HCPCS | Performed by: INTERNAL MEDICINE

## 2017-01-01 PROCEDURE — 83735 ASSAY OF MAGNESIUM: CPT

## 2017-01-01 PROCEDURE — 87102 FUNGUS ISOLATION CULTURE: CPT | Performed by: INTERNAL MEDICINE

## 2017-01-01 PROCEDURE — 99239 HOSP IP/OBS DSCHRG MGMT >30: CPT | Performed by: INTERNAL MEDICINE

## 2017-01-01 PROCEDURE — 0656 HSPC GENERAL INPATIENT

## 2017-01-01 PROCEDURE — 83605 ASSAY OF LACTIC ACID: CPT | Performed by: NURSE PRACTITIONER

## 2017-01-01 PROCEDURE — 96415 CHEMO IV INFUSION ADDL HR: CPT

## 2017-01-01 PROCEDURE — 89050 BODY FLUID CELL COUNT: CPT | Performed by: INTERNAL MEDICINE

## 2017-01-01 PROCEDURE — 3E0436Z INTRODUCTION OF NUTRITIONAL SUBSTANCE INTO CENTRAL VEIN, PERCUTANEOUS APPROACH: ICD-10-PCS | Performed by: INTERNAL MEDICINE

## 2017-01-01 PROCEDURE — 87086 URINE CULTURE/COLONY COUNT: CPT | Performed by: NURSE PRACTITIONER

## 2017-01-01 PROCEDURE — 87076 CULTURE ANAEROBE IDENT EACH: CPT | Performed by: INTERNAL MEDICINE

## 2017-01-01 PROCEDURE — 74011000258 HC RX REV CODE- 258: Performed by: EMERGENCY MEDICINE

## 2017-01-01 PROCEDURE — 36415 COLL VENOUS BLD VENIPUNCTURE: CPT | Performed by: NURSE PRACTITIONER

## 2017-01-01 PROCEDURE — 77030019605

## 2017-01-01 PROCEDURE — 93005 ELECTROCARDIOGRAM TRACING: CPT | Performed by: EMERGENCY MEDICINE

## 2017-01-01 PROCEDURE — 70470 CT HEAD/BRAIN W/O & W/DYE: CPT

## 2017-01-01 PROCEDURE — 82140 ASSAY OF AMMONIA: CPT | Performed by: NURSE PRACTITIONER

## 2017-01-01 PROCEDURE — 83605 ASSAY OF LACTIC ACID: CPT | Performed by: INTERNAL MEDICINE

## 2017-01-01 PROCEDURE — 84157 ASSAY OF PROTEIN OTHER: CPT | Performed by: NURSE PRACTITIONER

## 2017-01-01 PROCEDURE — 76040000019: Performed by: INTERNAL MEDICINE

## 2017-01-01 PROCEDURE — 77030007288 HC DEV LOK BILI BSC -A: Performed by: INTERNAL MEDICINE

## 2017-01-01 PROCEDURE — 99223 1ST HOSP IP/OBS HIGH 75: CPT | Performed by: INTERNAL MEDICINE

## 2017-01-01 PROCEDURE — 87205 SMEAR GRAM STAIN: CPT | Performed by: NURSE PRACTITIONER

## 2017-01-01 PROCEDURE — 83605 ASSAY OF LACTIC ACID: CPT

## 2017-01-01 PROCEDURE — 97162 PT EVAL MOD COMPLEX 30 MIN: CPT

## 2017-01-01 PROCEDURE — 85025 COMPLETE CBC W/AUTO DIFF WBC: CPT | Performed by: EMERGENCY MEDICINE

## 2017-01-01 PROCEDURE — 96367 TX/PROPH/DG ADDL SEQ IV INF: CPT | Performed by: EMERGENCY MEDICINE

## 2017-01-01 PROCEDURE — 74330 X-RAY BILE/PANC ENDOSCOPY: CPT

## 2017-01-01 PROCEDURE — 83615 LACTATE (LD) (LDH) ENZYME: CPT | Performed by: INTERNAL MEDICINE

## 2017-01-01 PROCEDURE — 82248 BILIRUBIN DIRECT: CPT | Performed by: INTERNAL MEDICINE

## 2017-01-01 PROCEDURE — 77030013991 HC SNR POLYP ENDOSC BSC -A: Performed by: INTERNAL MEDICINE

## 2017-01-01 PROCEDURE — BF10YZZ FLUOROSCOPY OF BILE DUCTS USING OTHER CONTRAST: ICD-10-PCS | Performed by: INTERNAL MEDICINE

## 2017-01-01 PROCEDURE — 80048 BASIC METABOLIC PNL TOTAL CA: CPT | Performed by: INTERNAL MEDICINE

## 2017-01-01 PROCEDURE — 80202 ASSAY OF VANCOMYCIN: CPT

## 2017-01-01 PROCEDURE — 76040000026: Performed by: INTERNAL MEDICINE

## 2017-01-01 PROCEDURE — 0F778DZ DILATION OF COMMON HEPATIC DUCT WITH INTRALUMINAL DEVICE, VIA NATURAL OR ARTIFICIAL OPENING ENDOSCOPIC: ICD-10-PCS | Performed by: INTERNAL MEDICINE

## 2017-01-01 PROCEDURE — 99284 EMERGENCY DEPT VISIT MOD MDM: CPT | Performed by: EMERGENCY MEDICINE

## 2017-01-01 PROCEDURE — 81001 URINALYSIS AUTO W/SCOPE: CPT | Performed by: INTERNAL MEDICINE

## 2017-01-01 PROCEDURE — 74011250636 HC RX REV CODE- 250/636: Performed by: EMERGENCY MEDICINE

## 2017-01-01 PROCEDURE — 97166 OT EVAL MOD COMPLEX 45 MIN: CPT

## 2017-01-01 PROCEDURE — 97161 PT EVAL LOW COMPLEX 20 MIN: CPT

## 2017-01-01 PROCEDURE — 74000 XR ABD (KUB): CPT

## 2017-01-01 PROCEDURE — 32555 ASPIRATE PLEURA W/ IMAGING: CPT | Performed by: INTERNAL MEDICINE

## 2017-01-01 PROCEDURE — 82945 GLUCOSE OTHER FLUID: CPT | Performed by: INTERNAL MEDICINE

## 2017-01-01 PROCEDURE — 0DJD8ZZ INSPECTION OF LOWER INTESTINAL TRACT, VIA NATURAL OR ARTIFICIAL OPENING ENDOSCOPIC: ICD-10-PCS | Performed by: INTERNAL MEDICINE

## 2017-01-01 PROCEDURE — 99285 EMERGENCY DEPT VISIT HI MDM: CPT | Performed by: EMERGENCY MEDICINE

## 2017-01-01 PROCEDURE — 36415 COLL VENOUS BLD VENIPUNCTURE: CPT

## 2017-01-01 PROCEDURE — 87205 SMEAR GRAM STAIN: CPT | Performed by: INTERNAL MEDICINE

## 2017-01-01 PROCEDURE — 88112 CYTOPATH CELL ENHANCE TECH: CPT | Performed by: INTERNAL MEDICINE

## 2017-01-01 PROCEDURE — 85610 PROTHROMBIN TIME: CPT | Performed by: INTERNAL MEDICINE

## 2017-01-01 PROCEDURE — 36592 COLLECT BLOOD FROM PICC: CPT

## 2017-01-01 PROCEDURE — 88305 TISSUE EXAM BY PATHOLOGIST: CPT | Performed by: INTERNAL MEDICINE

## 2017-01-01 PROCEDURE — 76040000025: Performed by: INTERNAL MEDICINE

## 2017-01-01 PROCEDURE — 87205 SMEAR GRAM STAIN: CPT | Performed by: EMERGENCY MEDICINE

## 2017-01-01 PROCEDURE — 77030032490 HC SLV COMPR SCD KNE COVD -B: Performed by: INTERNAL MEDICINE

## 2017-01-01 PROCEDURE — 99232 SBSQ HOSP IP/OBS MODERATE 35: CPT | Performed by: INTERNAL MEDICINE

## 2017-01-01 PROCEDURE — 3336500001 HSPC ELECTION

## 2017-01-01 PROCEDURE — 0FPB8DZ REMOVAL OF INTRALUMINAL DEVICE FROM HEPATOBILIARY DUCT, VIA NATURAL OR ARTIFICIAL OPENING ENDOSCOPIC: ICD-10-PCS | Performed by: INTERNAL MEDICINE

## 2017-01-01 PROCEDURE — 71010 XR CHEST SNGL V: CPT

## 2017-01-01 PROCEDURE — 0W993ZX DRAINAGE OF RIGHT PLEURAL CAVITY, PERCUTANEOUS APPROACH, DIAGNOSTIC: ICD-10-PCS | Performed by: INTERNAL MEDICINE

## 2017-01-01 PROCEDURE — 74011000250 HC RX REV CODE- 250

## 2017-01-01 PROCEDURE — 77030034849

## 2017-01-01 PROCEDURE — 84478 ASSAY OF TRIGLYCERIDES: CPT | Performed by: NURSE PRACTITIONER

## 2017-01-01 PROCEDURE — 36415 COLL VENOUS BLD VENIPUNCTURE: CPT | Performed by: INTERNAL MEDICINE

## 2017-01-01 PROCEDURE — 89050 BODY FLUID CELL COUNT: CPT | Performed by: NURSE PRACTITIONER

## 2017-01-01 PROCEDURE — 96375 TX/PRO/DX INJ NEW DRUG ADDON: CPT | Performed by: EMERGENCY MEDICINE

## 2017-01-01 PROCEDURE — 0FB78ZX EXCISION OF COMMON HEPATIC DUCT, VIA NATURAL OR ARTIFICIAL OPENING ENDOSCOPIC, DIAGNOSTIC: ICD-10-PCS | Performed by: INTERNAL MEDICINE

## 2017-01-01 PROCEDURE — 87040 BLOOD CULTURE FOR BACTERIA: CPT | Performed by: EMERGENCY MEDICINE

## 2017-01-01 PROCEDURE — 87493 C DIFF AMPLIFIED PROBE: CPT | Performed by: NURSE PRACTITIONER

## 2017-01-01 PROCEDURE — 77030012595 HC SPHNTOM BILI BSC -D: Performed by: INTERNAL MEDICINE

## 2017-01-01 PROCEDURE — C1729 CATH, DRAINAGE: HCPCS

## 2017-01-01 PROCEDURE — 77030008477 HC STYL SATN SLP COVD -A: Performed by: ANESTHESIOLOGY

## 2017-01-01 PROCEDURE — 96366 THER/PROPH/DIAG IV INF ADDON: CPT | Performed by: EMERGENCY MEDICINE

## 2017-01-01 PROCEDURE — 77030021668 HC NEB PREFIL KT VYRM -A

## 2017-01-01 DEVICE — BILIARY STENT WITH RX DELIVERY SYSTEM
Type: IMPLANTABLE DEVICE | Site: BILE DUCT | Status: FUNCTIONAL
Brand: RX BILIARY

## 2017-01-01 RX ORDER — DOCUSATE SODIUM 100 MG/1
200 CAPSULE, LIQUID FILLED ORAL
COMMUNITY

## 2017-01-01 RX ORDER — LEVOFLOXACIN 5 MG/ML
500 INJECTION, SOLUTION INTRAVENOUS EVERY 24 HOURS
Status: DISCONTINUED | OUTPATIENT
Start: 2017-01-01 | End: 2017-01-01

## 2017-01-01 RX ORDER — SODIUM CHLORIDE 0.9 % (FLUSH) 0.9 %
10-40 SYRINGE (ML) INJECTION AS NEEDED
Status: DISCONTINUED | OUTPATIENT
Start: 2017-01-01 | End: 2017-01-01 | Stop reason: HOSPADM

## 2017-01-01 RX ORDER — SODIUM CHLORIDE 0.9 % (FLUSH) 0.9 %
10 SYRINGE (ML) INJECTION AS NEEDED
Status: ACTIVE | OUTPATIENT
Start: 2017-01-01 | End: 2017-01-01

## 2017-01-01 RX ORDER — SODIUM CHLORIDE 0.9 % (FLUSH) 0.9 %
5-10 SYRINGE (ML) INJECTION EVERY 8 HOURS
Status: DISCONTINUED | OUTPATIENT
Start: 2017-01-01 | End: 2017-01-01 | Stop reason: HOSPADM

## 2017-01-01 RX ORDER — SODIUM CHLORIDE 450 MG/100ML
100 INJECTION, SOLUTION INTRAVENOUS CONTINUOUS
Status: DISCONTINUED | OUTPATIENT
Start: 2017-01-01 | End: 2017-01-01 | Stop reason: HOSPADM

## 2017-01-01 RX ORDER — ONDANSETRON 2 MG/ML
INJECTION INTRAMUSCULAR; INTRAVENOUS AS NEEDED
Status: DISCONTINUED | OUTPATIENT
Start: 2017-01-01 | End: 2017-01-01 | Stop reason: HOSPADM

## 2017-01-01 RX ORDER — METRONIDAZOLE 500 MG/1
500 TABLET ORAL EVERY 12 HOURS
Status: DISCONTINUED | OUTPATIENT
Start: 2017-01-01 | End: 2017-01-01 | Stop reason: HOSPADM

## 2017-01-01 RX ORDER — PANTOPRAZOLE SODIUM 40 MG/1
40 TABLET, DELAYED RELEASE ORAL
Status: DISCONTINUED | OUTPATIENT
Start: 2017-01-01 | End: 2017-01-01

## 2017-01-01 RX ORDER — HALOPERIDOL 5 MG/ML
2 INJECTION INTRAMUSCULAR
Status: DISCONTINUED | OUTPATIENT
Start: 2017-01-01 | End: 2017-01-01 | Stop reason: HOSPADM

## 2017-01-01 RX ORDER — HYDROCORTISONE SODIUM SUCCINATE 100 MG/2ML
100 INJECTION, POWDER, FOR SOLUTION INTRAMUSCULAR; INTRAVENOUS AS NEEDED
Status: CANCELLED | OUTPATIENT
Start: 2017-01-01

## 2017-01-01 RX ORDER — ATROPINE SULFATE 0.4 MG/ML
0.4 INJECTION, SOLUTION ENDOTRACHEAL; INTRAMEDULLARY; INTRAMUSCULAR; INTRAVENOUS; SUBCUTANEOUS ONCE
Status: COMPLETED | OUTPATIENT
Start: 2017-01-01 | End: 2017-01-01

## 2017-01-01 RX ORDER — PANTOPRAZOLE SODIUM 40 MG/1
40 TABLET, DELAYED RELEASE ORAL
Status: DISCONTINUED | OUTPATIENT
Start: 2017-01-01 | End: 2017-01-01 | Stop reason: HOSPADM

## 2017-01-01 RX ORDER — ACETAMINOPHEN 650 MG/1
650 SUPPOSITORY RECTAL
Status: DISCONTINUED | OUTPATIENT
Start: 2017-01-01 | End: 2017-01-01 | Stop reason: HOSPADM

## 2017-01-01 RX ORDER — MORPHINE SULFATE 2 MG/ML
INJECTION, SOLUTION INTRAMUSCULAR; INTRAVENOUS
Status: COMPLETED
Start: 2017-01-01 | End: 2017-01-01

## 2017-01-01 RX ORDER — HEPARIN 100 UNIT/ML
300-500 SYRINGE INTRAVENOUS AS NEEDED
Status: DISPENSED | OUTPATIENT
Start: 2017-01-01 | End: 2017-01-01

## 2017-01-01 RX ORDER — HEPARIN 100 UNIT/ML
500 SYRINGE INTRAVENOUS AS NEEDED
Status: DISPENSED | OUTPATIENT
Start: 2017-01-01 | End: 2017-01-01

## 2017-01-01 RX ORDER — MORPHINE SULFATE 8 MG/ML
4 INJECTION, SOLUTION INTRAMUSCULAR; INTRAVENOUS
Status: COMPLETED | OUTPATIENT
Start: 2017-01-01 | End: 2017-01-01

## 2017-01-01 RX ORDER — SODIUM CHLORIDE, SODIUM LACTATE, POTASSIUM CHLORIDE, CALCIUM CHLORIDE 600; 310; 30; 20 MG/100ML; MG/100ML; MG/100ML; MG/100ML
125 INJECTION, SOLUTION INTRAVENOUS CONTINUOUS
Status: DISCONTINUED | OUTPATIENT
Start: 2017-01-01 | End: 2017-01-01 | Stop reason: HOSPADM

## 2017-01-01 RX ORDER — MIRTAZAPINE 15 MG/1
15 TABLET, FILM COATED ORAL
COMMUNITY

## 2017-01-01 RX ORDER — DEXAMETHASONE SODIUM PHOSPHATE 100 MG/10ML
10 INJECTION INTRAMUSCULAR; INTRAVENOUS ONCE
Status: COMPLETED | OUTPATIENT
Start: 2017-01-01 | End: 2017-01-01

## 2017-01-01 RX ORDER — SODIUM CHLORIDE 0.9 % (FLUSH) 0.9 %
10-30 SYRINGE (ML) INJECTION AS NEEDED
Status: DISCONTINUED | OUTPATIENT
Start: 2017-01-01 | End: 2017-01-01 | Stop reason: HOSPADM

## 2017-01-01 RX ORDER — LANOLIN ALCOHOL/MO/W.PET/CERES
800 CREAM (GRAM) TOPICAL 2 TIMES DAILY
Status: DISCONTINUED | OUTPATIENT
Start: 2017-01-01 | End: 2017-01-01

## 2017-01-01 RX ORDER — EPINEPHRINE 0.1 MG/ML
0.3 INJECTION INTRACARDIAC; INTRAVENOUS AS NEEDED
Status: CANCELLED
Start: 2017-01-01

## 2017-01-01 RX ORDER — LORAZEPAM 2 MG/ML
1 INJECTION INTRAMUSCULAR
Status: DISCONTINUED | OUTPATIENT
Start: 2017-01-01 | End: 2017-01-01 | Stop reason: HOSPADM

## 2017-01-01 RX ORDER — PROCHLORPERAZINE EDISYLATE 5 MG/ML
5 INJECTION INTRAMUSCULAR; INTRAVENOUS
Status: DISCONTINUED | OUTPATIENT
Start: 2017-01-01 | End: 2017-01-01 | Stop reason: HOSPADM

## 2017-01-01 RX ORDER — LIDOCAINE HYDROCHLORIDE 10 MG/ML
0.1 INJECTION INFILTRATION; PERINEURAL AS NEEDED
Status: DISCONTINUED | OUTPATIENT
Start: 2017-01-01 | End: 2017-01-01 | Stop reason: HOSPADM

## 2017-01-01 RX ORDER — ENOXAPARIN SODIUM 100 MG/ML
40 INJECTION SUBCUTANEOUS EVERY 24 HOURS
Status: DISCONTINUED | OUTPATIENT
Start: 2017-01-01 | End: 2017-01-01 | Stop reason: HOSPADM

## 2017-01-01 RX ORDER — HEPARIN 100 UNIT/ML
300 SYRINGE INTRAVENOUS AS NEEDED
Status: ACTIVE | OUTPATIENT
Start: 2017-01-01 | End: 2017-01-01

## 2017-01-01 RX ORDER — FLUOROURACIL 50 MG/ML
320 INJECTION, SOLUTION INTRAVENOUS ONCE
Status: COMPLETED | OUTPATIENT
Start: 2017-01-01 | End: 2017-01-01

## 2017-01-01 RX ORDER — PROCHLORPERAZINE MALEATE 10 MG
10 TABLET ORAL
Status: DISCONTINUED | OUTPATIENT
Start: 2017-01-01 | End: 2017-01-01 | Stop reason: HOSPADM

## 2017-01-01 RX ORDER — ONDANSETRON 2 MG/ML
8 INJECTION INTRAMUSCULAR; INTRAVENOUS ONCE
Status: COMPLETED | OUTPATIENT
Start: 2017-01-01 | End: 2017-01-01

## 2017-01-01 RX ORDER — ACETAMINOPHEN 325 MG/1
650 TABLET ORAL AS NEEDED
Status: CANCELLED
Start: 2017-01-01

## 2017-01-01 RX ORDER — ONDANSETRON 2 MG/ML
4 INJECTION INTRAMUSCULAR; INTRAVENOUS
Status: DISCONTINUED | OUTPATIENT
Start: 2017-01-01 | End: 2017-01-01 | Stop reason: HOSPADM

## 2017-01-01 RX ORDER — FLUOROURACIL 50 MG/ML
240 INJECTION, SOLUTION INTRAVENOUS ONCE
Status: COMPLETED | OUTPATIENT
Start: 2017-01-01 | End: 2017-01-01

## 2017-01-01 RX ORDER — ACETAMINOPHEN 325 MG/1
650 TABLET ORAL
Status: DISCONTINUED | OUTPATIENT
Start: 2017-01-01 | End: 2017-01-01 | Stop reason: HOSPADM

## 2017-01-01 RX ORDER — ALBUTEROL SULFATE 0.83 MG/ML
2.5 SOLUTION RESPIRATORY (INHALATION) AS NEEDED
Status: CANCELLED
Start: 2017-01-01

## 2017-01-01 RX ORDER — SODIUM CHLORIDE 9 MG/ML
100 INJECTION, SOLUTION INTRAVENOUS CONTINUOUS
Status: DISCONTINUED | OUTPATIENT
Start: 2017-01-01 | End: 2017-01-01

## 2017-01-01 RX ORDER — VANCOMYCIN/0.9 % SOD CHLORIDE 1.5G/250ML
1500 PLASTIC BAG, INJECTION (ML) INTRAVENOUS
Status: DISCONTINUED | OUTPATIENT
Start: 2017-01-01 | End: 2017-01-01

## 2017-01-01 RX ORDER — HEPARIN SODIUM 1000 [USP'U]/ML
2000 INJECTION, SOLUTION INTRAVENOUS; SUBCUTANEOUS AS NEEDED
Status: CANCELLED
Start: 2017-01-01

## 2017-01-01 RX ORDER — DEXTROSE MONOHYDRATE 50 MG/ML
25 INJECTION, SOLUTION INTRAVENOUS ONCE
Status: COMPLETED | OUTPATIENT
Start: 2017-01-01 | End: 2017-01-01

## 2017-01-01 RX ORDER — HEPARIN 100 UNIT/ML
300-500 SYRINGE INTRAVENOUS AS NEEDED
Status: DISCONTINUED | OUTPATIENT
Start: 2017-01-01 | End: 2017-01-01 | Stop reason: HOSPADM

## 2017-01-01 RX ORDER — MORPHINE SULFATE 2 MG/ML
2 INJECTION, SOLUTION INTRAMUSCULAR; INTRAVENOUS
Status: DISCONTINUED | OUTPATIENT
Start: 2017-01-01 | End: 2017-01-01 | Stop reason: HOSPADM

## 2017-01-01 RX ORDER — HEPARIN 100 UNIT/ML
300-500 SYRINGE INTRAVENOUS AS NEEDED
Status: ACTIVE | OUTPATIENT
Start: 2017-01-01 | End: 2017-01-01

## 2017-01-01 RX ORDER — SODIUM CHLORIDE 9 MG/ML
75 INJECTION, SOLUTION INTRAVENOUS CONTINUOUS
Status: DISCONTINUED | OUTPATIENT
Start: 2017-01-01 | End: 2017-01-01 | Stop reason: HOSPADM

## 2017-01-01 RX ORDER — LORAZEPAM 1 MG/1
1-2 TABLET ORAL
Status: DISCONTINUED | OUTPATIENT
Start: 2017-01-01 | End: 2017-01-01 | Stop reason: HOSPADM

## 2017-01-01 RX ORDER — SODIUM CHLORIDE 0.9 % (FLUSH) 0.9 %
10 SYRINGE (ML) INJECTION
Status: COMPLETED | OUTPATIENT
Start: 2017-01-01 | End: 2017-01-01

## 2017-01-01 RX ORDER — MELOXICAM 7.5 MG/1
7.5 TABLET ORAL 2 TIMES DAILY
COMMUNITY
End: 2017-01-01

## 2017-01-01 RX ORDER — HEPARIN 100 UNIT/ML
300 SYRINGE INTRAVENOUS EVERY 12 HOURS
Status: DISCONTINUED | OUTPATIENT
Start: 2017-01-01 | End: 2017-01-01 | Stop reason: HOSPADM

## 2017-01-01 RX ORDER — DIPHENOXYLATE HYDROCHLORIDE AND ATROPINE SULFATE 2.5; .025 MG/1; MG/1
2 TABLET ORAL
Status: DISCONTINUED | OUTPATIENT
Start: 2017-01-01 | End: 2017-01-01 | Stop reason: HOSPADM

## 2017-01-01 RX ORDER — TRAMADOL HYDROCHLORIDE 50 MG/1
50 TABLET ORAL
Status: DISCONTINUED | OUTPATIENT
Start: 2017-01-01 | End: 2017-01-01 | Stop reason: HOSPADM

## 2017-01-01 RX ORDER — FAMOTIDINE 20 MG/1
20 TABLET, FILM COATED ORAL 2 TIMES DAILY
Status: DISCONTINUED | OUTPATIENT
Start: 2017-01-01 | End: 2017-01-01 | Stop reason: HOSPADM

## 2017-01-01 RX ORDER — ACETAMINOPHEN 325 MG/1
650 TABLET ORAL ONCE
Status: COMPLETED | OUTPATIENT
Start: 2017-01-01 | End: 2017-01-01

## 2017-01-01 RX ORDER — FUROSEMIDE 10 MG/ML
40 INJECTION INTRAMUSCULAR; INTRAVENOUS ONCE
Status: COMPLETED | OUTPATIENT
Start: 2017-01-01 | End: 2017-01-01

## 2017-01-01 RX ORDER — OXYCODONE HYDROCHLORIDE 5 MG/1
10 TABLET ORAL
Status: DISCONTINUED | OUTPATIENT
Start: 2017-01-01 | End: 2017-01-01 | Stop reason: HOSPADM

## 2017-01-01 RX ORDER — MULTIVITAMIN
1 CAPSULE ORAL
COMMUNITY

## 2017-01-01 RX ORDER — SODIUM CHLORIDE 0.9 % (FLUSH) 0.9 %
10 SYRINGE (ML) INJECTION AS NEEDED
Status: CANCELLED | OUTPATIENT
Start: 2017-01-01

## 2017-01-01 RX ORDER — LANOLIN ALCOHOL/MO/W.PET/CERES
400 CREAM (GRAM) TOPICAL 3 TIMES DAILY
Status: DISCONTINUED | OUTPATIENT
Start: 2017-01-01 | End: 2017-01-01 | Stop reason: HOSPADM

## 2017-01-01 RX ORDER — HEPARIN 100 UNIT/ML
300-500 SYRINGE INTRAVENOUS AS NEEDED
Status: CANCELLED | OUTPATIENT
Start: 2017-01-01

## 2017-01-01 RX ORDER — HEPARIN 100 UNIT/ML
300 SYRINGE INTRAVENOUS AS NEEDED
Status: DISCONTINUED | OUTPATIENT
Start: 2017-01-01 | End: 2017-01-01 | Stop reason: HOSPADM

## 2017-01-01 RX ORDER — PHENOBARBITAL SODIUM 65 MG/ML
65 INJECTION INTRAMUSCULAR EVERY 12 HOURS
Status: DISCONTINUED | OUTPATIENT
Start: 2017-01-01 | End: 2017-01-01 | Stop reason: HOSPADM

## 2017-01-01 RX ORDER — SODIUM CHLORIDE 0.9 % (FLUSH) 0.9 %
10 SYRINGE (ML) INJECTION AS NEEDED
Status: DISCONTINUED | OUTPATIENT
Start: 2017-01-01 | End: 2017-01-01 | Stop reason: HOSPADM

## 2017-01-01 RX ORDER — DRONABINOL 2.5 MG/1
2.5 CAPSULE ORAL
Status: DISCONTINUED | OUTPATIENT
Start: 2017-01-01 | End: 2017-01-01 | Stop reason: HOSPADM

## 2017-01-01 RX ORDER — POLYVINYL ALCOHOL 14 MG/ML
1 SOLUTION/ DROPS OPHTHALMIC AS NEEDED
Status: DISCONTINUED | OUTPATIENT
Start: 2017-01-01 | End: 2017-01-01 | Stop reason: HOSPADM

## 2017-01-01 RX ORDER — PROCHLORPERAZINE MALEATE 10 MG
10 TABLET ORAL
Status: DISCONTINUED | OUTPATIENT
Start: 2017-01-01 | End: 2017-01-01

## 2017-01-01 RX ORDER — SODIUM CHLORIDE 0.9 % (FLUSH) 0.9 %
10 SYRINGE (ML) INJECTION
Status: ACTIVE | OUTPATIENT
Start: 2017-01-01 | End: 2017-01-01

## 2017-01-01 RX ORDER — DOCUSATE SODIUM 100 MG/1
200 CAPSULE, LIQUID FILLED ORAL DAILY
Status: DISCONTINUED | OUTPATIENT
Start: 2017-01-01 | End: 2017-01-01 | Stop reason: HOSPADM

## 2017-01-01 RX ORDER — LANOLIN ALCOHOL/MO/W.PET/CERES
50 CREAM (GRAM) TOPICAL DAILY
Status: DISCONTINUED | OUTPATIENT
Start: 2017-01-01 | End: 2017-01-01

## 2017-01-01 RX ORDER — VANCOMYCIN/0.9 % SOD CHLORIDE 1.5G/250ML
1500 PLASTIC BAG, INJECTION (ML) INTRAVENOUS EVERY 24 HOURS
Status: DISCONTINUED | OUTPATIENT
Start: 2017-01-01 | End: 2017-01-01

## 2017-01-01 RX ORDER — LANOLIN ALCOHOL/MO/W.PET/CERES
800 CREAM (GRAM) TOPICAL 2 TIMES DAILY
Status: DISCONTINUED | OUTPATIENT
Start: 2017-01-01 | End: 2017-01-01 | Stop reason: HOSPADM

## 2017-01-01 RX ORDER — SODIUM CHLORIDE 0.9 % (FLUSH) 0.9 %
5-10 SYRINGE (ML) INJECTION AS NEEDED
Status: DISCONTINUED | OUTPATIENT
Start: 2017-01-01 | End: 2017-01-01 | Stop reason: HOSPADM

## 2017-01-01 RX ORDER — MIDAZOLAM HYDROCHLORIDE 1 MG/ML
INJECTION, SOLUTION INTRAMUSCULAR; INTRAVENOUS AS NEEDED
Status: DISCONTINUED | OUTPATIENT
Start: 2017-01-01 | End: 2017-01-01 | Stop reason: HOSPADM

## 2017-01-01 RX ORDER — SODIUM CHLORIDE, SODIUM LACTATE, POTASSIUM CHLORIDE, CALCIUM CHLORIDE 600; 310; 30; 20 MG/100ML; MG/100ML; MG/100ML; MG/100ML
100 INJECTION, SOLUTION INTRAVENOUS CONTINUOUS
Status: DISCONTINUED | OUTPATIENT
Start: 2017-01-01 | End: 2017-01-01 | Stop reason: HOSPADM

## 2017-01-01 RX ORDER — DIPHENHYDRAMINE HYDROCHLORIDE 50 MG/ML
50 INJECTION, SOLUTION INTRAMUSCULAR; INTRAVENOUS AS NEEDED
Status: CANCELLED
Start: 2017-01-01

## 2017-01-01 RX ORDER — CIPROFLOXACIN 2 MG/ML
400 INJECTION, SOLUTION INTRAVENOUS EVERY 12 HOURS
Status: DISCONTINUED | OUTPATIENT
Start: 2017-01-01 | End: 2017-01-01 | Stop reason: HOSPADM

## 2017-01-01 RX ORDER — DRONABINOL 2.5 MG/1
2.5 CAPSULE ORAL
Qty: 60 CAP | Refills: 2 | Status: SHIPPED | OUTPATIENT
Start: 2017-01-01 | End: 2017-01-01

## 2017-01-01 RX ORDER — FACIAL-BODY WIPES
10 EACH TOPICAL AS NEEDED
Status: DISCONTINUED | OUTPATIENT
Start: 2017-01-01 | End: 2017-01-01 | Stop reason: HOSPADM

## 2017-01-01 RX ORDER — MIRTAZAPINE 15 MG/1
TABLET, FILM COATED ORAL
COMMUNITY
End: 2017-01-01 | Stop reason: ALTCHOICE

## 2017-01-01 RX ORDER — HEPARIN 100 UNIT/ML
300 SYRINGE INTRAVENOUS ONCE
Status: DISCONTINUED | OUTPATIENT
Start: 2017-01-01 | End: 2017-01-01 | Stop reason: HOSPADM

## 2017-01-01 RX ORDER — METRONIDAZOLE 500 MG/1
500 TABLET ORAL EVERY 12 HOURS
Qty: 4 TAB | Refills: 0 | Status: SHIPPED | OUTPATIENT
Start: 2017-01-01 | End: 2017-01-01

## 2017-01-01 RX ORDER — ONDANSETRON 2 MG/ML
8 INJECTION INTRAMUSCULAR; INTRAVENOUS AS NEEDED
Status: CANCELLED | OUTPATIENT
Start: 2017-01-01

## 2017-01-01 RX ORDER — DEXAMETHASONE SODIUM PHOSPHATE 4 MG/ML
10 INJECTION, SOLUTION INTRA-ARTICULAR; INTRALESIONAL; INTRAMUSCULAR; INTRAVENOUS; SOFT TISSUE ONCE
Status: COMPLETED | OUTPATIENT
Start: 2017-01-01 | End: 2017-01-01

## 2017-01-01 RX ORDER — MORPHINE SULFATE 8 MG/ML
2 INJECTION, SOLUTION INTRAMUSCULAR; INTRAVENOUS
Status: DISCONTINUED | OUTPATIENT
Start: 2017-01-01 | End: 2017-01-01 | Stop reason: HOSPADM

## 2017-01-01 RX ORDER — MELOXICAM 7.5 MG/1
7.5 TABLET ORAL 2 TIMES DAILY
Status: DISCONTINUED | OUTPATIENT
Start: 2017-01-01 | End: 2017-01-01

## 2017-01-01 RX ORDER — HEPARIN 100 UNIT/ML
500 SYRINGE INTRAVENOUS AS NEEDED
Status: ACTIVE | OUTPATIENT
Start: 2017-01-01 | End: 2017-01-01

## 2017-01-01 RX ORDER — HEPARIN 100 UNIT/ML
500 SYRINGE INTRAVENOUS AS NEEDED
Status: DISCONTINUED | OUTPATIENT
Start: 2017-01-01 | End: 2017-01-01

## 2017-01-01 RX ORDER — HEPARIN 100 UNIT/ML
500 SYRINGE INTRAVENOUS AS NEEDED
Status: COMPLETED | OUTPATIENT
Start: 2017-01-01 | End: 2017-01-01

## 2017-01-01 RX ORDER — METRONIDAZOLE 500 MG/100ML
500 INJECTION, SOLUTION INTRAVENOUS EVERY 12 HOURS
Status: DISCONTINUED | OUTPATIENT
Start: 2017-01-01 | End: 2017-01-01 | Stop reason: HOSPADM

## 2017-01-01 RX ORDER — SODIUM CHLORIDE 9 MG/ML
1000 INJECTION, SOLUTION INTRAVENOUS ONCE
Status: COMPLETED | OUTPATIENT
Start: 2017-01-01 | End: 2017-01-01

## 2017-01-01 RX ORDER — ONDANSETRON 8 MG/1
8 TABLET, ORALLY DISINTEGRATING ORAL
Status: DISCONTINUED | OUTPATIENT
Start: 2017-01-01 | End: 2017-01-01 | Stop reason: HOSPADM

## 2017-01-01 RX ORDER — SODIUM CHLORIDE, SODIUM LACTATE, POTASSIUM CHLORIDE, CALCIUM CHLORIDE 600; 310; 30; 20 MG/100ML; MG/100ML; MG/100ML; MG/100ML
100 INJECTION, SOLUTION INTRAVENOUS CONTINUOUS
Status: CANCELLED | OUTPATIENT
Start: 2017-01-01

## 2017-01-01 RX ORDER — GLYCOPYRROLATE 0.2 MG/ML
0.2 INJECTION INTRAMUSCULAR; INTRAVENOUS
Status: DISCONTINUED | OUTPATIENT
Start: 2017-01-01 | End: 2017-01-01 | Stop reason: HOSPADM

## 2017-01-01 RX ORDER — LIDOCAINE HYDROCHLORIDE 20 MG/ML
INJECTION, SOLUTION EPIDURAL; INFILTRATION; INTRACAUDAL; PERINEURAL AS NEEDED
Status: DISCONTINUED | OUTPATIENT
Start: 2017-01-01 | End: 2017-01-01 | Stop reason: HOSPADM

## 2017-01-01 RX ORDER — SODIUM CHLORIDE 9 MG/ML
125 INJECTION, SOLUTION INTRAVENOUS CONTINUOUS
Status: DISPENSED | OUTPATIENT
Start: 2017-01-01 | End: 2017-01-01

## 2017-01-01 RX ORDER — SODIUM CHLORIDE 9 MG/ML
500 INJECTION, SOLUTION INTRAVENOUS ONCE
Status: ACTIVE | OUTPATIENT
Start: 2017-01-01 | End: 2017-01-01

## 2017-01-01 RX ORDER — PANTOPRAZOLE SODIUM 40 MG/10ML
40 INJECTION, POWDER, LYOPHILIZED, FOR SOLUTION INTRAVENOUS DAILY
Status: DISCONTINUED | OUTPATIENT
Start: 2017-01-01 | End: 2017-01-01 | Stop reason: HOSPADM

## 2017-01-01 RX ORDER — LANOLIN ALCOHOL/MO/W.PET/CERES
50 CREAM (GRAM) TOPICAL
COMMUNITY

## 2017-01-01 RX ORDER — LOPERAMIDE HYDROCHLORIDE 2 MG/1
4 CAPSULE ORAL ONCE
Status: COMPLETED | OUTPATIENT
Start: 2017-01-01 | End: 2017-01-01

## 2017-01-01 RX ORDER — ROCURONIUM BROMIDE 10 MG/ML
INJECTION, SOLUTION INTRAVENOUS AS NEEDED
Status: DISCONTINUED | OUTPATIENT
Start: 2017-01-01 | End: 2017-01-01 | Stop reason: HOSPADM

## 2017-01-01 RX ORDER — HYDROCODONE BITARTRATE AND ACETAMINOPHEN 5; 325 MG/1; MG/1
1 TABLET ORAL
Status: DISCONTINUED | OUTPATIENT
Start: 2017-01-01 | End: 2017-01-01 | Stop reason: HOSPADM

## 2017-01-01 RX ORDER — SODIUM CHLORIDE 0.9 % (FLUSH) 0.9 %
10 SYRINGE (ML) INJECTION EVERY 8 HOURS
Status: DISCONTINUED | OUTPATIENT
Start: 2017-01-01 | End: 2017-01-01 | Stop reason: HOSPADM

## 2017-01-01 RX ORDER — SODIUM CHLORIDE 0.9 % (FLUSH) 0.9 %
10 SYRINGE (ML) INJECTION EVERY 12 HOURS
Status: DISCONTINUED | OUTPATIENT
Start: 2017-01-01 | End: 2017-01-01 | Stop reason: HOSPADM

## 2017-01-01 RX ORDER — LORAZEPAM 2 MG/ML
2 INJECTION INTRAMUSCULAR
Status: DISCONTINUED | OUTPATIENT
Start: 2017-01-01 | End: 2017-01-01 | Stop reason: HOSPADM

## 2017-01-01 RX ORDER — PROPOFOL 10 MG/ML
INJECTION, EMULSION INTRAVENOUS AS NEEDED
Status: DISCONTINUED | OUTPATIENT
Start: 2017-01-01 | End: 2017-01-01 | Stop reason: HOSPADM

## 2017-01-01 RX ORDER — DEXAMETHASONE SODIUM PHOSPHATE 4 MG/ML
INJECTION, SOLUTION INTRA-ARTICULAR; INTRALESIONAL; INTRAMUSCULAR; INTRAVENOUS; SOFT TISSUE AS NEEDED
Status: DISCONTINUED | OUTPATIENT
Start: 2017-01-01 | End: 2017-01-01 | Stop reason: HOSPADM

## 2017-01-01 RX ORDER — TRAMADOL HYDROCHLORIDE 50 MG/1
50 TABLET ORAL
COMMUNITY
End: 2017-01-01 | Stop reason: SDUPTHER

## 2017-01-01 RX ORDER — NALOXONE HYDROCHLORIDE 0.4 MG/ML
0.1 INJECTION, SOLUTION INTRAMUSCULAR; INTRAVENOUS; SUBCUTANEOUS AS NEEDED
Status: DISCONTINUED | OUTPATIENT
Start: 2017-01-01 | End: 2017-01-01 | Stop reason: HOSPADM

## 2017-01-01 RX ORDER — LEVOFLOXACIN 500 MG/1
500 TABLET, FILM COATED ORAL EVERY 24 HOURS
Status: DISCONTINUED | OUTPATIENT
Start: 2017-01-01 | End: 2017-01-01 | Stop reason: HOSPADM

## 2017-01-01 RX ORDER — CIPROFLOXACIN 500 MG/1
500 TABLET ORAL 2 TIMES DAILY
Qty: 6 TAB | Refills: 0 | Status: SHIPPED | OUTPATIENT
Start: 2017-01-01 | End: 2017-01-01

## 2017-01-01 RX ORDER — MIDAZOLAM HYDROCHLORIDE 1 MG/ML
.25-5 INJECTION, SOLUTION INTRAMUSCULAR; INTRAVENOUS
Status: DISCONTINUED | OUTPATIENT
Start: 2017-01-01 | End: 2017-01-01 | Stop reason: HOSPADM

## 2017-01-01 RX ORDER — LANOLIN ALCOHOL/MO/W.PET/CERES
1 CREAM (GRAM) TOPICAL
COMMUNITY

## 2017-01-01 RX ORDER — HEPARIN 100 UNIT/ML
300 SYRINGE INTRAVENOUS AS NEEDED
Status: DISPENSED | OUTPATIENT
Start: 2017-01-01 | End: 2017-01-01

## 2017-01-01 RX ORDER — SUCCINYLCHOLINE CHLORIDE 20 MG/ML
INJECTION INTRAMUSCULAR; INTRAVENOUS AS NEEDED
Status: DISCONTINUED | OUTPATIENT
Start: 2017-01-01 | End: 2017-01-01 | Stop reason: HOSPADM

## 2017-01-01 RX ORDER — MIDAZOLAM HYDROCHLORIDE 1 MG/ML
2 INJECTION, SOLUTION INTRAMUSCULAR; INTRAVENOUS
Status: DISCONTINUED | OUTPATIENT
Start: 2017-01-01 | End: 2017-01-01 | Stop reason: HOSPADM

## 2017-01-01 RX ORDER — SODIUM CHLORIDE 0.9 % (FLUSH) 0.9 %
5-10 SYRINGE (ML) INJECTION AS NEEDED
Status: CANCELLED | OUTPATIENT
Start: 2017-01-01

## 2017-01-01 RX ORDER — FENTANYL CITRATE 50 UG/ML
50 INJECTION, SOLUTION INTRAMUSCULAR; INTRAVENOUS
Status: DISCONTINUED | OUTPATIENT
Start: 2017-01-01 | End: 2017-01-01 | Stop reason: HOSPADM

## 2017-01-01 RX ORDER — POTASSIUM CHLORIDE 29.8 MG/ML
40 INJECTION INTRAVENOUS ONCE
Status: COMPLETED | OUTPATIENT
Start: 2017-01-01 | End: 2017-01-01

## 2017-01-01 RX ORDER — ACETAMINOPHEN 500 MG
1000 TABLET ORAL ONCE
Status: DISCONTINUED | OUTPATIENT
Start: 2017-01-01 | End: 2017-01-01 | Stop reason: HOSPADM

## 2017-01-01 RX ORDER — VANCOMYCIN/0.9 % SOD CHLORIDE 1.5G/250ML
1500 PLASTIC BAG, INJECTION (ML) INTRAVENOUS ONCE
Status: COMPLETED | OUTPATIENT
Start: 2017-01-01 | End: 2017-01-01

## 2017-01-01 RX ORDER — HEPARIN 100 UNIT/ML
100 SYRINGE INTRAVENOUS
Status: COMPLETED | OUTPATIENT
Start: 2017-01-01 | End: 2017-01-01

## 2017-01-01 RX ORDER — LEVOFLOXACIN 500 MG/1
500 TABLET, FILM COATED ORAL EVERY 24 HOURS
Qty: 2 TAB | Refills: 0 | Status: SHIPPED | OUTPATIENT
Start: 2017-01-01 | End: 2017-01-01

## 2017-01-01 RX ORDER — OMEPRAZOLE 20 MG/1
20 CAPSULE, DELAYED RELEASE ORAL
COMMUNITY

## 2017-01-01 RX ORDER — ONDANSETRON 2 MG/ML
4 INJECTION INTRAMUSCULAR; INTRAVENOUS
Status: COMPLETED | OUTPATIENT
Start: 2017-01-01 | End: 2017-01-01

## 2017-01-01 RX ORDER — DIPHENHYDRAMINE HYDROCHLORIDE 50 MG/ML
50 INJECTION, SOLUTION INTRAMUSCULAR; INTRAVENOUS ONCE
Status: COMPLETED | OUTPATIENT
Start: 2017-01-01 | End: 2017-01-01

## 2017-01-01 RX ORDER — TAMSULOSIN HYDROCHLORIDE 0.4 MG/1
0.4 CAPSULE ORAL
COMMUNITY

## 2017-01-01 RX ORDER — LIDOCAINE HYDROCHLORIDE 20 MG/ML
15 SOLUTION OROPHARYNGEAL AS NEEDED
Status: DISCONTINUED | OUTPATIENT
Start: 2017-01-01 | End: 2017-01-01 | Stop reason: HOSPADM

## 2017-01-01 RX ORDER — SODIUM CHLORIDE 0.9 % (FLUSH) 0.9 %
10-40 SYRINGE (ML) INJECTION EVERY 8 HOURS
Status: COMPLETED | OUTPATIENT
Start: 2017-01-01 | End: 2017-01-01

## 2017-01-01 RX ORDER — CEFUROXIME AXETIL 500 MG/1
500 TABLET ORAL 2 TIMES DAILY
COMMUNITY
End: 2017-01-01

## 2017-01-01 RX ORDER — SODIUM CHLORIDE 0.9 % (FLUSH) 0.9 %
10 SYRINGE (ML) INJECTION AS NEEDED
Status: DISCONTINUED | OUTPATIENT
Start: 2017-01-01 | End: 2019-10-24 | Stop reason: HOSPADM

## 2017-01-01 RX ORDER — DOCUSATE SODIUM 100 MG/1
100 CAPSULE, LIQUID FILLED ORAL
Status: DISCONTINUED | OUTPATIENT
Start: 2017-01-01 | End: 2017-01-01 | Stop reason: HOSPADM

## 2017-01-01 RX ORDER — FENTANYL CITRATE 50 UG/ML
INJECTION, SOLUTION INTRAMUSCULAR; INTRAVENOUS AS NEEDED
Status: DISCONTINUED | OUTPATIENT
Start: 2017-01-01 | End: 2017-01-01 | Stop reason: HOSPADM

## 2017-01-01 RX ORDER — LOPERAMIDE HYDROCHLORIDE 2 MG/1
2 CAPSULE ORAL AS NEEDED
Status: DISCONTINUED | OUTPATIENT
Start: 2017-01-01 | End: 2017-01-01 | Stop reason: HOSPADM

## 2017-01-01 RX ADMIN — SODIUM CHLORIDE 500 ML: 900 INJECTION, SOLUTION INTRAVENOUS at 13:00

## 2017-01-01 RX ADMIN — DEXAMETHASONE SODIUM PHOSPHATE 10 MG: 10 INJECTION INTRAMUSCULAR; INTRAVENOUS at 08:01

## 2017-01-01 RX ADMIN — LEVOFLOXACIN 500 MG: 5 INJECTION, SOLUTION INTRAVENOUS at 13:44

## 2017-01-01 RX ADMIN — ONDANSETRON 4 MG: 2 INJECTION INTRAMUSCULAR; INTRAVENOUS at 20:20

## 2017-01-01 RX ADMIN — SUCCINYLCHOLINE CHLORIDE 120 MG: 20 INJECTION INTRAMUSCULAR; INTRAVENOUS at 15:18

## 2017-01-01 RX ADMIN — SODIUM CHLORIDE 75 ML/HR: 900 INJECTION, SOLUTION INTRAVENOUS at 06:37

## 2017-01-01 RX ADMIN — PANTOPRAZOLE SODIUM 40 MG: 40 TABLET, DELAYED RELEASE ORAL at 08:22

## 2017-01-01 RX ADMIN — LEUCOVORIN CALCIUM 475 MG: 200 INJECTION, POWDER, LYOPHILIZED, FOR SOLUTION INTRAMUSCULAR; INTRAVENOUS at 14:50

## 2017-01-01 RX ADMIN — IRINOTECAN HYDROCHLORIDE 238 MG: 100 INJECTION, SOLUTION INTRAVENOUS at 13:54

## 2017-01-01 RX ADMIN — SOYBEAN OIL 250 ML: 20 INJECTION, SOLUTION INTRAVENOUS at 18:25

## 2017-01-01 RX ADMIN — SODIUM CHLORIDE 75 ML/HR: 900 INJECTION, SOLUTION INTRAVENOUS at 17:02

## 2017-01-01 RX ADMIN — CEFEPIME HYDROCHLORIDE 2 G: 2 INJECTION, POWDER, FOR SOLUTION INTRAVENOUS at 09:39

## 2017-01-01 RX ADMIN — MELOXICAM 7.5 MG: 7.5 TABLET ORAL at 21:30

## 2017-01-01 RX ADMIN — SODIUM CHLORIDE 500 ML: 900 INJECTION, SOLUTION INTRAVENOUS at 10:20

## 2017-01-01 RX ADMIN — ATROPINE SULFATE 0.4 MG: 0.4 INJECTION, SOLUTION INTRAMUSCULAR; INTRAVENOUS; SUBCUTANEOUS at 13:56

## 2017-01-01 RX ADMIN — SOYBEAN OIL 500 ML: 20 INJECTION, SOLUTION INTRAVENOUS at 18:36

## 2017-01-01 RX ADMIN — CEFEPIME HYDROCHLORIDE 2 G: 2 INJECTION, POWDER, FOR SOLUTION INTRAVENOUS at 21:10

## 2017-01-01 RX ADMIN — DIATRIZOATE MEGLUMINE AND DIATRIZOATE SODIUM 15 ML: 600; 100 SOLUTION ORAL; RECTAL at 05:01

## 2017-01-01 RX ADMIN — ENOXAPARIN SODIUM 40 MG: 40 INJECTION SUBCUTANEOUS at 08:21

## 2017-01-01 RX ADMIN — Medication 4 MG: at 14:23

## 2017-01-01 RX ADMIN — SODIUM CHLORIDE 125 ML/HR: 900 INJECTION, SOLUTION INTRAVENOUS at 12:03

## 2017-01-01 RX ADMIN — Medication 10 ML: at 05:15

## 2017-01-01 RX ADMIN — SODIUM CHLORIDE, SODIUM LACTATE, POTASSIUM CHLORIDE, AND CALCIUM CHLORIDE 100 ML/HR: 600; 310; 30; 20 INJECTION, SOLUTION INTRAVENOUS at 14:06

## 2017-01-01 RX ADMIN — Medication 20 ML: at 16:30

## 2017-01-01 RX ADMIN — Medication 2 MG: at 00:23

## 2017-01-01 RX ADMIN — DEXAMETHASONE SODIUM PHOSPHATE 10 MG: 10 INJECTION INTRAMUSCULAR; INTRAVENOUS at 12:42

## 2017-01-01 RX ADMIN — SODIUM CHLORIDE, PRESERVATIVE FREE 100 UNITS: 5 INJECTION INTRAVENOUS at 16:04

## 2017-01-01 RX ADMIN — SODIUM CHLORIDE, PRESERVATIVE FREE 300 UNITS: 5 INJECTION INTRAVENOUS at 10:31

## 2017-01-01 RX ADMIN — Medication 20 ML: at 15:45

## 2017-01-01 RX ADMIN — SODIUM CHLORIDE 1000 ML: 900 INJECTION, SOLUTION INTRAVENOUS at 14:13

## 2017-01-01 RX ADMIN — MELOXICAM 7.5 MG: 7.5 TABLET ORAL at 08:05

## 2017-01-01 RX ADMIN — PANTOPRAZOLE SODIUM 40 MG: 40 TABLET, DELAYED RELEASE ORAL at 07:43

## 2017-01-01 RX ADMIN — SODIUM CHLORIDE 500 ML: 900 INJECTION, SOLUTION INTRAVENOUS at 12:05

## 2017-01-01 RX ADMIN — BEVACIZUMAB 409 MG: 400 INJECTION, SOLUTION INTRAVENOUS at 13:05

## 2017-01-01 RX ADMIN — ONDANSETRON 4 MG: 2 INJECTION INTRAMUSCULAR; INTRAVENOUS at 15:48

## 2017-01-01 RX ADMIN — SOYBEAN OIL 21 ML/HR: 20 INJECTION, SOLUTION INTRAVENOUS at 17:46

## 2017-01-01 RX ADMIN — DRONABINOL 2.5 MG: 2.5 CAPSULE ORAL at 08:21

## 2017-01-01 RX ADMIN — LACTULOSE 30 G: 20 SOLUTION ORAL at 07:46

## 2017-01-01 RX ADMIN — Medication 10 ML: at 05:12

## 2017-01-01 RX ADMIN — FLUOROURACIL 633.5 MG: 50 INJECTION, SOLUTION INTRAVENOUS at 12:20

## 2017-01-01 RX ADMIN — TRAMADOL HYDROCHLORIDE 50 MG: 50 TABLET, FILM COATED ORAL at 02:10

## 2017-01-01 RX ADMIN — LACTULOSE 30 G: 20 SOLUTION ORAL at 10:01

## 2017-01-01 RX ADMIN — CEFEPIME HYDROCHLORIDE 2 G: 2 INJECTION, POWDER, FOR SOLUTION INTRAVENOUS at 23:47

## 2017-01-01 RX ADMIN — DEXTROSE MONOHYDRATE 25 ML/HR: 5 INJECTION, SOLUTION INTRAVENOUS at 12:32

## 2017-01-01 RX ADMIN — Medication 10 ML: at 11:05

## 2017-01-01 RX ADMIN — ONDANSETRON 4 MG: 2 INJECTION INTRAMUSCULAR; INTRAVENOUS at 22:17

## 2017-01-01 RX ADMIN — CEFEPIME HYDROCHLORIDE 2 G: 2 INJECTION, POWDER, FOR SOLUTION INTRAVENOUS at 23:52

## 2017-01-01 RX ADMIN — POLYVINYL ALCOHOL 1 DROP: 14 SOLUTION/ DROPS OPHTHALMIC at 23:53

## 2017-01-01 RX ADMIN — Medication 10 ML: at 10:31

## 2017-01-01 RX ADMIN — ONDANSETRON 8 MG: 2 INJECTION INTRAMUSCULAR; INTRAVENOUS at 09:19

## 2017-01-01 RX ADMIN — Medication 800 MG: at 18:21

## 2017-01-01 RX ADMIN — PANTOPRAZOLE SODIUM 40 MG: 40 TABLET, DELAYED RELEASE ORAL at 08:17

## 2017-01-01 RX ADMIN — Medication 800 MG: at 07:46

## 2017-01-01 RX ADMIN — FLUOROURACIL 475 MG: 50 INJECTION, SOLUTION INTRAVENOUS at 15:17

## 2017-01-01 RX ADMIN — Medication 10 ML: at 17:05

## 2017-01-01 RX ADMIN — SODIUM CHLORIDE, PRESERVATIVE FREE 500 UNITS: 5 INJECTION INTRAVENOUS at 11:05

## 2017-01-01 RX ADMIN — SOYBEAN OIL 500 ML: 20 INJECTION, SOLUTION INTRAVENOUS at 17:57

## 2017-01-01 RX ADMIN — PIPERACILLIN SODIUM,TAZOBACTAM SODIUM 4.5 G: 4; .5 INJECTION, POWDER, FOR SOLUTION INTRAVENOUS at 13:45

## 2017-01-01 RX ADMIN — LACTULOSE 30 G: 20 SOLUTION ORAL at 21:14

## 2017-01-01 RX ADMIN — FAMOTIDINE 20 MG: 20 TABLET, FILM COATED ORAL at 08:17

## 2017-01-01 RX ADMIN — Medication 10 ML: at 12:05

## 2017-01-01 RX ADMIN — PANTOPRAZOLE SODIUM 40 MG: 40 TABLET, DELAYED RELEASE ORAL at 08:21

## 2017-01-01 RX ADMIN — TRAMADOL HYDROCHLORIDE 50 MG: 50 TABLET, FILM COATED ORAL at 08:17

## 2017-01-01 RX ADMIN — LORAZEPAM 1 MG: 2 INJECTION INTRAMUSCULAR; INTRAVENOUS at 14:07

## 2017-01-01 RX ADMIN — Medication 20 ML: at 12:57

## 2017-01-01 RX ADMIN — IOPAMIDOL 100 ML: 755 INJECTION, SOLUTION INTRAVENOUS at 14:10

## 2017-01-01 RX ADMIN — VANCOMYCIN HYDROCHLORIDE 1500 MG: 10 INJECTION, POWDER, LYOPHILIZED, FOR SOLUTION INTRAVENOUS at 03:42

## 2017-01-01 RX ADMIN — SODIUM CHLORIDE 500 ML: 900 INJECTION, SOLUTION INTRAVENOUS at 15:33

## 2017-01-01 RX ADMIN — Medication 10 ML: at 12:18

## 2017-01-01 RX ADMIN — POTASSIUM CHLORIDE 40 MEQ: 29.8 INJECTION, SOLUTION INTRAVENOUS at 07:36

## 2017-01-01 RX ADMIN — FLUOROURACIL 633.5 MG: 50 INJECTION, SOLUTION INTRAVENOUS at 16:20

## 2017-01-01 RX ADMIN — SODIUM CHLORIDE 500 ML: 900 INJECTION, SOLUTION INTRAVENOUS at 11:00

## 2017-01-01 RX ADMIN — BEVACIZUMAB 409 MG: 400 INJECTION, SOLUTION INTRAVENOUS at 12:11

## 2017-01-01 RX ADMIN — ONDANSETRON 8 MG: 2 INJECTION INTRAMUSCULAR; INTRAVENOUS at 12:42

## 2017-01-01 RX ADMIN — DEXTROSE MONOHYDRATE 25 ML/HR: 5 INJECTION, SOLUTION INTRAVENOUS at 11:08

## 2017-01-01 RX ADMIN — SODIUM CHLORIDE 100 ML: 900 INJECTION, SOLUTION INTRAVENOUS at 09:58

## 2017-01-01 RX ADMIN — DRONABINOL 2.5 MG: 2.5 CAPSULE ORAL at 16:18

## 2017-01-01 RX ADMIN — ATROPINE SULFATE 0.4 MG: 0.4 INJECTION, SOLUTION INTRAMUSCULAR; INTRAVENOUS; SUBCUTANEOUS at 12:58

## 2017-01-01 RX ADMIN — LEUCOVORIN CALCIUM 634 MG: 200 INJECTION, POWDER, LYOPHILIZED, FOR SOLUTION INTRAMUSCULAR; INTRAVENOUS at 11:15

## 2017-01-01 RX ADMIN — VANCOMYCIN HYDROCHLORIDE 1500 MG: 10 INJECTION, POWDER, LYOPHILIZED, FOR SOLUTION INTRAVENOUS at 10:29

## 2017-01-01 RX ADMIN — DRONABINOL 2.5 MG: 2.5 CAPSULE ORAL at 16:45

## 2017-01-01 RX ADMIN — FENTANYL CITRATE 50 MCG: 50 INJECTION, SOLUTION INTRAMUSCULAR; INTRAVENOUS at 15:18

## 2017-01-01 RX ADMIN — ONDANSETRON 8 MG: 2 INJECTION INTRAMUSCULAR; INTRAVENOUS at 08:00

## 2017-01-01 RX ADMIN — SODIUM CHLORIDE 75 ML/HR: 900 INJECTION, SOLUTION INTRAVENOUS at 01:52

## 2017-01-01 RX ADMIN — METRONIDAZOLE 500 MG: 500 INJECTION, SOLUTION INTRAVENOUS at 16:12

## 2017-01-01 RX ADMIN — METRONIDAZOLE 500 MG: 500 TABLET ORAL at 04:25

## 2017-01-01 RX ADMIN — Medication 400 MG: at 09:24

## 2017-01-01 RX ADMIN — Medication 10 ML: at 15:11

## 2017-01-01 RX ADMIN — CEFEPIME HYDROCHLORIDE 2 G: 2 INJECTION, POWDER, FOR SOLUTION INTRAVENOUS at 16:13

## 2017-01-01 RX ADMIN — Medication 10 ML: at 16:00

## 2017-01-01 RX ADMIN — TRAMADOL HYDROCHLORIDE 50 MG: 50 TABLET, FILM COATED ORAL at 09:43

## 2017-01-01 RX ADMIN — CEFEPIME HYDROCHLORIDE 2 G: 2 INJECTION, POWDER, FOR SOLUTION INTRAVENOUS at 16:25

## 2017-01-01 RX ADMIN — LACTULOSE 30 G: 20 SOLUTION ORAL at 18:25

## 2017-01-01 RX ADMIN — TRAMADOL HYDROCHLORIDE 50 MG: 50 TABLET, FILM COATED ORAL at 23:04

## 2017-01-01 RX ADMIN — DEXTROSE MONOHYDRATE 25 ML/HR: 5 INJECTION, SOLUTION INTRAVENOUS at 13:07

## 2017-01-01 RX ADMIN — ENOXAPARIN SODIUM 40 MG: 40 INJECTION SUBCUTANEOUS at 09:07

## 2017-01-01 RX ADMIN — CIPROFLOXACIN 400 MG: 2 INJECTION, SOLUTION INTRAVENOUS at 03:46

## 2017-01-01 RX ADMIN — LEUCOVORIN CALCIUM 634 MG: 350 INJECTION, POWDER, LYOPHILIZED, FOR SOLUTION INTRAMUSCULAR; INTRAVENOUS at 10:20

## 2017-01-01 RX ADMIN — BEVACIZUMAB 409 MG: 400 INJECTION, SOLUTION INTRAVENOUS at 10:38

## 2017-01-01 RX ADMIN — METRONIDAZOLE 500 MG: 500 TABLET ORAL at 16:04

## 2017-01-01 RX ADMIN — Medication 2 MG: at 11:18

## 2017-01-01 RX ADMIN — Medication 800 MG: at 16:04

## 2017-01-01 RX ADMIN — TRAMADOL HYDROCHLORIDE 50 MG: 50 TABLET, FILM COATED ORAL at 23:19

## 2017-01-01 RX ADMIN — CEFEPIME HYDROCHLORIDE 2 G: 2 INJECTION, POWDER, FOR SOLUTION INTRAVENOUS at 07:49

## 2017-01-01 RX ADMIN — ENOXAPARIN SODIUM 40 MG: 40 INJECTION SUBCUTANEOUS at 09:11

## 2017-01-01 RX ADMIN — FUROSEMIDE 40 MG: 10 INJECTION, SOLUTION INTRAMUSCULAR; INTRAVENOUS at 22:24

## 2017-01-01 RX ADMIN — SODIUM CHLORIDE 125 ML/HR: 900 INJECTION, SOLUTION INTRAVENOUS at 19:32

## 2017-01-01 RX ADMIN — ATROPINE SULFATE 0.4 MG: 0.4 INJECTION, SOLUTION INTRAMUSCULAR; INTRAVENOUS; SUBCUTANEOUS at 12:55

## 2017-01-01 RX ADMIN — LEUCOVORIN CALCIUM 475 MG: 200 INJECTION, POWDER, LYOPHILIZED, FOR SOLUTION INTRAMUSCULAR; INTRAVENOUS at 14:15

## 2017-01-01 RX ADMIN — ONDANSETRON 8 MG: 2 INJECTION INTRAMUSCULAR; INTRAVENOUS at 12:40

## 2017-01-01 RX ADMIN — ONDANSETRON 8 MG: 2 INJECTION INTRAMUSCULAR; INTRAVENOUS at 12:58

## 2017-01-01 RX ADMIN — SODIUM CHLORIDE 500 ML: 900 INJECTION, SOLUTION INTRAVENOUS at 09:30

## 2017-01-01 RX ADMIN — DEXAMETHASONE SODIUM PHOSPHATE 10 MG: 4 INJECTION, SOLUTION INTRAMUSCULAR; INTRAVENOUS at 13:34

## 2017-01-01 RX ADMIN — LEUCOVORIN CALCIUM 475 MG: 200 INJECTION, POWDER, LYOPHILIZED, FOR SOLUTION INTRAMUSCULAR; INTRAVENOUS at 13:07

## 2017-01-01 RX ADMIN — SODIUM CHLORIDE 75 ML/HR: 900 INJECTION, SOLUTION INTRAVENOUS at 07:44

## 2017-01-01 RX ADMIN — SODIUM CHLORIDE 1000 ML: 900 INJECTION, SOLUTION INTRAVENOUS at 03:29

## 2017-01-01 RX ADMIN — Medication 20 ML: at 13:40

## 2017-01-01 RX ADMIN — LEVOFLOXACIN 500 MG: 500 TABLET, FILM COATED ORAL at 18:21

## 2017-01-01 RX ADMIN — Medication 800 MG: at 10:01

## 2017-01-01 RX ADMIN — SODIUM CHLORIDE 100 ML/HR: 900 INJECTION, SOLUTION INTRAVENOUS at 12:32

## 2017-01-01 RX ADMIN — Medication 10 ML: at 16:53

## 2017-01-01 RX ADMIN — MELOXICAM 7.5 MG: 7.5 TABLET ORAL at 08:29

## 2017-01-01 RX ADMIN — DEXTROSE MONOHYDRATE 25 ML/HR: 5 INJECTION, SOLUTION INTRAVENOUS at 10:25

## 2017-01-01 RX ADMIN — Medication 10 ML: at 17:03

## 2017-01-01 RX ADMIN — ATROPINE SULFATE 0.4 MG: 0.4 INJECTION, SOLUTION INTRAMUSCULAR; INTRAVENOUS; SUBCUTANEOUS at 10:30

## 2017-01-01 RX ADMIN — TRAMADOL HYDROCHLORIDE 50 MG: 50 TABLET, FILM COATED ORAL at 07:43

## 2017-01-01 RX ADMIN — FLUOROURACIL 475 MG: 50 INJECTION, SOLUTION INTRAVENOUS at 15:02

## 2017-01-01 RX ADMIN — SODIUM CHLORIDE, PRESERVATIVE FREE 300 UNITS: 5 INJECTION INTRAVENOUS at 16:30

## 2017-01-01 RX ADMIN — METRONIDAZOLE 500 MG: 500 INJECTION, SOLUTION INTRAVENOUS at 20:15

## 2017-01-01 RX ADMIN — MULTIPLE VITAMINS W/ MINERALS TAB 1 TABLET: TAB at 08:05

## 2017-01-01 RX ADMIN — CEFTRIAXONE 1 G: 1 INJECTION, POWDER, FOR SOLUTION INTRAMUSCULAR; INTRAVENOUS at 00:51

## 2017-01-01 RX ADMIN — FAMOTIDINE 20 MG: 20 TABLET, FILM COATED ORAL at 11:51

## 2017-01-01 RX ADMIN — SODIUM CHLORIDE, SODIUM LACTATE, POTASSIUM CHLORIDE, AND CALCIUM CHLORIDE: 600; 310; 30; 20 INJECTION, SOLUTION INTRAVENOUS at 15:13

## 2017-01-01 RX ADMIN — SODIUM CHLORIDE, PRESERVATIVE FREE 300 UNITS: 5 INJECTION INTRAVENOUS at 13:40

## 2017-01-01 RX ADMIN — SODIUM CHLORIDE 1000 ML: 900 INJECTION, SOLUTION INTRAVENOUS at 13:25

## 2017-01-01 RX ADMIN — SODIUM CHLORIDE 100 ML: 900 INJECTION, SOLUTION INTRAVENOUS at 12:17

## 2017-01-01 RX ADMIN — FENTANYL CITRATE 50 MCG: 50 INJECTION, SOLUTION INTRAMUSCULAR; INTRAVENOUS at 15:19

## 2017-01-01 RX ADMIN — SODIUM CHLORIDE, PRESERVATIVE FREE 500 UNITS: 5 INJECTION INTRAVENOUS at 13:26

## 2017-01-01 RX ADMIN — DEXTROSE MONOHYDRATE 25 ML/HR: 5 INJECTION, SOLUTION INTRAVENOUS at 14:50

## 2017-01-01 RX ADMIN — LOPERAMIDE HYDROCHLORIDE 2 MG: 2 CAPSULE ORAL at 15:37

## 2017-01-01 RX ADMIN — BEVACIZUMAB 409 MG: 400 INJECTION, SOLUTION INTRAVENOUS at 12:25

## 2017-01-01 RX ADMIN — IRINOTECAN HYDROCHLORIDE 238 MG: 100 INJECTION, SOLUTION INTRAVENOUS at 11:16

## 2017-01-01 RX ADMIN — FLUOROURACIL 475 MG: 50 INJECTION, SOLUTION INTRAVENOUS at 16:25

## 2017-01-01 RX ADMIN — ACETAMINOPHEN 650 MG: 325 TABLET, FILM COATED ORAL at 09:15

## 2017-01-01 RX ADMIN — PANTOPRAZOLE SODIUM 40 MG: 40 TABLET, DELAYED RELEASE ORAL at 09:58

## 2017-01-01 RX ADMIN — SODIUM CHLORIDE 125 ML/HR: 900 INJECTION, SOLUTION INTRAVENOUS at 02:24

## 2017-01-01 RX ADMIN — DEXTROSE MONOHYDRATE 25 ML/HR: 5 INJECTION, SOLUTION INTRAVENOUS at 13:55

## 2017-01-01 RX ADMIN — Medication 10 ML: at 05:02

## 2017-01-01 RX ADMIN — Medication 500 UNITS: at 16:01

## 2017-01-01 RX ADMIN — PANTOPRAZOLE SODIUM 40 MG: 40 TABLET, DELAYED RELEASE ORAL at 07:58

## 2017-01-01 RX ADMIN — DEXTROSE MONOHYDRATE 25 ML/HR: 5 INJECTION, SOLUTION INTRAVENOUS at 10:15

## 2017-01-01 RX ADMIN — SODIUM CHLORIDE 75 ML/HR: 900 INJECTION, SOLUTION INTRAVENOUS at 18:23

## 2017-01-01 RX ADMIN — DIATRIZOATE MEGLUMINE AND DIATRIZOATE SODIUM 15 ML: 660; 100 LIQUID ORAL; RECTAL at 12:55

## 2017-01-01 RX ADMIN — METRONIDAZOLE 500 MG: 500 INJECTION, SOLUTION INTRAVENOUS at 21:10

## 2017-01-01 RX ADMIN — LEVOFLOXACIN 500 MG: 5 INJECTION, SOLUTION INTRAVENOUS at 12:04

## 2017-01-01 RX ADMIN — MULTIPLE VITAMINS W/ MINERALS TAB 1 TABLET: TAB at 07:58

## 2017-01-01 RX ADMIN — SOYBEAN OIL 500 ML: 20 INJECTION, SOLUTION INTRAVENOUS at 18:29

## 2017-01-01 RX ADMIN — MELOXICAM 7.5 MG: 7.5 TABLET ORAL at 07:58

## 2017-01-01 RX ADMIN — TRAMADOL HYDROCHLORIDE 50 MG: 50 TABLET, FILM COATED ORAL at 09:17

## 2017-01-01 RX ADMIN — SODIUM CHLORIDE, PRESERVATIVE FREE 300 UNITS: 5 INJECTION INTRAVENOUS at 21:00

## 2017-01-01 RX ADMIN — ONDANSETRON 8 MG: 2 INJECTION INTRAMUSCULAR; INTRAVENOUS at 13:06

## 2017-01-01 RX ADMIN — ATROPINE SULFATE 0.4 MG: 0.4 INJECTION, SOLUTION INTRAMUSCULAR; INTRAVENOUS; SUBCUTANEOUS at 10:42

## 2017-01-01 RX ADMIN — CIPROFLOXACIN 400 MG: 2 INJECTION, SOLUTION INTRAVENOUS at 04:14

## 2017-01-01 RX ADMIN — Medication 20 ML: at 15:35

## 2017-01-01 RX ADMIN — Medication 500 UNITS: at 15:46

## 2017-01-01 RX ADMIN — SODIUM CHLORIDE 100 ML/HR: 900 INJECTION, SOLUTION INTRAVENOUS at 22:42

## 2017-01-01 RX ADMIN — SODIUM CHLORIDE 100 ML/HR: 450 INJECTION, SOLUTION INTRAVENOUS at 03:46

## 2017-01-01 RX ADMIN — ONDANSETRON 8 MG: 2 INJECTION INTRAMUSCULAR; INTRAVENOUS at 13:42

## 2017-01-01 RX ADMIN — SODIUM CHLORIDE 500 ML: 900 INJECTION, SOLUTION INTRAVENOUS at 09:10

## 2017-01-01 RX ADMIN — DEXTROSE MONOHYDRATE 25 ML/HR: 5 INJECTION, SOLUTION INTRAVENOUS at 13:15

## 2017-01-01 RX ADMIN — IRINOTECAN HYDROCHLORIDE 238 MG: 100 INJECTION, SOLUTION INTRAVENOUS at 13:25

## 2017-01-01 RX ADMIN — DEXAMETHASONE SODIUM PHOSPHATE 10 MG: 10 INJECTION INTRAMUSCULAR; INTRAVENOUS at 13:04

## 2017-01-01 RX ADMIN — ONDANSETRON 4 MG: 2 INJECTION INTRAMUSCULAR; INTRAVENOUS at 07:46

## 2017-01-01 RX ADMIN — MELOXICAM 7.5 MG: 7.5 TABLET ORAL at 18:24

## 2017-01-01 RX ADMIN — IRINOTECAN HYDROCHLORIDE 178 MG: 100 INJECTION, SOLUTION INTRAVENOUS at 09:10

## 2017-01-01 RX ADMIN — CEFEPIME HYDROCHLORIDE 2 G: 2 INJECTION, POWDER, FOR SOLUTION INTRAVENOUS at 23:19

## 2017-01-01 RX ADMIN — ENOXAPARIN SODIUM 40 MG: 40 INJECTION SUBCUTANEOUS at 09:58

## 2017-01-01 RX ADMIN — DIPHENOXYLATE HYDROCHLORIDE AND ATROPINE SULFATE 2 TABLET: 2.5; .025 TABLET ORAL at 20:55

## 2017-01-01 RX ADMIN — LIDOCAINE HYDROCHLORIDE 60 MG: 20 INJECTION, SOLUTION EPIDURAL; INFILTRATION; INTRACAUDAL; PERINEURAL at 15:18

## 2017-01-01 RX ADMIN — DEXAMETHASONE SODIUM PHOSPHATE 10 MG: 10 INJECTION INTRAMUSCULAR; INTRAVENOUS at 13:07

## 2017-01-01 RX ADMIN — FLUOROURACIL 633.5 MG: 50 INJECTION, SOLUTION INTRAVENOUS at 13:15

## 2017-01-01 RX ADMIN — DEXAMETHASONE SODIUM PHOSPHATE 10 MG: 10 INJECTION INTRAMUSCULAR; INTRAVENOUS at 12:56

## 2017-01-01 RX ADMIN — MELOXICAM 7.5 MG: 7.5 TABLET ORAL at 17:59

## 2017-01-01 RX ADMIN — MAGNESIUM SULFATE HEPTAHYDRATE: 500 INJECTION, SOLUTION INTRAMUSCULAR; INTRAVENOUS at 18:36

## 2017-01-01 RX ADMIN — PANTOPRAZOLE SODIUM 40 MG: 40 TABLET, DELAYED RELEASE ORAL at 06:37

## 2017-01-01 RX ADMIN — MIDAZOLAM HYDROCHLORIDE 2 MG: 1 INJECTION, SOLUTION INTRAMUSCULAR; INTRAVENOUS at 15:16

## 2017-01-01 RX ADMIN — DEXTROSE MONOHYDRATE 25 ML/HR: 5 INJECTION, SOLUTION INTRAVENOUS at 14:10

## 2017-01-01 RX ADMIN — LOPERAMIDE HYDROCHLORIDE 2 MG: 2 CAPSULE ORAL at 16:30

## 2017-01-01 RX ADMIN — FLUOROURACIL 633.5 MG: 50 INJECTION, SOLUTION INTRAVENOUS at 16:04

## 2017-01-01 RX ADMIN — SODIUM CHLORIDE, PRESERVATIVE FREE 300 UNITS: 5 INJECTION INTRAVENOUS at 15:45

## 2017-01-01 RX ADMIN — DEXAMETHASONE SODIUM PHOSPHATE 10 MG: 10 INJECTION INTRAMUSCULAR; INTRAVENOUS at 13:44

## 2017-01-01 RX ADMIN — FAMOTIDINE 20 MG: 20 TABLET, FILM COATED ORAL at 16:04

## 2017-01-01 RX ADMIN — PANTOPRAZOLE SODIUM 40 MG: 40 INJECTION, POWDER, FOR SOLUTION INTRAVENOUS at 08:54

## 2017-01-01 RX ADMIN — BEVACIZUMAB 409 MG: 400 INJECTION, SOLUTION INTRAVENOUS at 09:51

## 2017-01-01 RX ADMIN — Medication 10 ML: at 12:55

## 2017-01-01 RX ADMIN — DEXAMETHASONE SODIUM PHOSPHATE 10 MG: 10 INJECTION INTRAMUSCULAR; INTRAVENOUS at 13:09

## 2017-01-01 RX ADMIN — IOPAMIDOL 100 ML: 755 INJECTION, SOLUTION INTRAVENOUS at 16:15

## 2017-01-01 RX ADMIN — Medication 500 UNITS: at 17:03

## 2017-01-01 RX ADMIN — Medication 800 MG: at 07:58

## 2017-01-01 RX ADMIN — SODIUM CHLORIDE 100 ML/HR: 450 INJECTION, SOLUTION INTRAVENOUS at 11:17

## 2017-01-01 RX ADMIN — ROCURONIUM BROMIDE 10 MG: 10 INJECTION, SOLUTION INTRAVENOUS at 15:18

## 2017-01-01 RX ADMIN — SODIUM CHLORIDE 500 ML: 900 INJECTION, SOLUTION INTRAVENOUS at 12:30

## 2017-01-01 RX ADMIN — CIPROFLOXACIN 400 MG: 2 INJECTION, SOLUTION INTRAVENOUS at 17:06

## 2017-01-01 RX ADMIN — PANTOPRAZOLE SODIUM 40 MG: 40 TABLET, DELAYED RELEASE ORAL at 07:49

## 2017-01-01 RX ADMIN — VANCOMYCIN HYDROCHLORIDE 1500 MG: 10 INJECTION, POWDER, LYOPHILIZED, FOR SOLUTION INTRAVENOUS at 21:46

## 2017-01-01 RX ADMIN — MELOXICAM 7.5 MG: 7.5 TABLET ORAL at 18:13

## 2017-01-01 RX ADMIN — Medication 800 MG: at 08:17

## 2017-01-01 RX ADMIN — ATROPINE SULFATE 0.4 MG: 0.4 INJECTION, SOLUTION INTRAMUSCULAR; INTRAVENOUS; SUBCUTANEOUS at 14:15

## 2017-01-01 RX ADMIN — FLUOROURACIL 475 MG: 50 INJECTION, SOLUTION INTRAVENOUS at 15:49

## 2017-01-01 RX ADMIN — Medication 50 MG: at 08:29

## 2017-01-01 RX ADMIN — FAMOTIDINE 20 MG: 20 TABLET, FILM COATED ORAL at 18:21

## 2017-01-01 RX ADMIN — LEVOFLOXACIN 500 MG: 5 INJECTION, SOLUTION INTRAVENOUS at 14:04

## 2017-01-01 RX ADMIN — TRAMADOL HYDROCHLORIDE 50 MG: 50 TABLET, FILM COATED ORAL at 14:04

## 2017-01-01 RX ADMIN — ONDANSETRON 4 MG: 2 INJECTION INTRAMUSCULAR; INTRAVENOUS at 00:03

## 2017-01-01 RX ADMIN — Medication 10 ML: at 09:58

## 2017-01-01 RX ADMIN — CIPROFLOXACIN 400 MG: 2 INJECTION, SOLUTION INTRAVENOUS at 16:18

## 2017-01-01 RX ADMIN — CIPROFLOXACIN 400 MG: 2 INJECTION, SOLUTION INTRAVENOUS at 16:45

## 2017-01-01 RX ADMIN — MELOXICAM 7.5 MG: 7.5 TABLET ORAL at 08:22

## 2017-01-01 RX ADMIN — PROPOFOL 150 MG: 10 INJECTION, EMULSION INTRAVENOUS at 15:18

## 2017-01-01 RX ADMIN — POLYVINYL ALCOHOL 1 DROP: 14 SOLUTION/ DROPS OPHTHALMIC at 08:24

## 2017-01-01 RX ADMIN — Medication 10 ML: at 14:00

## 2017-01-01 RX ADMIN — LACTULOSE 30 G: 20 SOLUTION ORAL at 22:22

## 2017-01-01 RX ADMIN — BEVACIZUMAB 409 MG: 400 INJECTION, SOLUTION INTRAVENOUS at 13:37

## 2017-01-01 RX ADMIN — SODIUM CHLORIDE 100 ML/HR: 900 INJECTION, SOLUTION INTRAVENOUS at 03:00

## 2017-01-01 RX ADMIN — ATROPINE SULFATE 0.4 MG: 0.4 INJECTION, SOLUTION INTRAMUSCULAR; INTRAVENOUS; SUBCUTANEOUS at 08:04

## 2017-01-01 RX ADMIN — Medication 800 MG: at 18:20

## 2017-01-01 RX ADMIN — SODIUM CHLORIDE 125 ML/HR: 900 INJECTION, SOLUTION INTRAVENOUS at 07:06

## 2017-01-01 RX ADMIN — Medication 800 MG: at 17:59

## 2017-01-01 RX ADMIN — Medication 10 ML: at 09:00

## 2017-01-01 RX ADMIN — ONDANSETRON 8 MG: 2 INJECTION INTRAMUSCULAR; INTRAVENOUS at 10:14

## 2017-01-01 RX ADMIN — BEVACIZUMAB 409 MG: 400 INJECTION, SOLUTION INTRAVENOUS at 12:00

## 2017-01-01 RX ADMIN — METRONIDAZOLE 500 MG: 500 INJECTION, SOLUTION INTRAVENOUS at 08:29

## 2017-01-01 RX ADMIN — IOPAMIDOL 100 ML: 755 INJECTION, SOLUTION INTRAVENOUS at 07:07

## 2017-01-01 RX ADMIN — POLYVINYL ALCOHOL 1 DROP: 14 SOLUTION/ DROPS OPHTHALMIC at 21:14

## 2017-01-01 RX ADMIN — ONDANSETRON 4 MG: 2 INJECTION INTRAMUSCULAR; INTRAVENOUS at 14:53

## 2017-01-01 RX ADMIN — ONDANSETRON 4 MG: 2 INJECTION INTRAMUSCULAR; INTRAVENOUS at 14:13

## 2017-01-01 RX ADMIN — Medication 10 ML: at 07:55

## 2017-01-01 RX ADMIN — MELOXICAM 7.5 MG: 7.5 TABLET ORAL at 18:34

## 2017-01-01 RX ADMIN — LEUCOVORIN CALCIUM 634 MG: 200 INJECTION, POWDER, LYOPHILIZED, FOR SOLUTION INTRAMUSCULAR; INTRAVENOUS at 14:00

## 2017-01-01 RX ADMIN — Medication 50 MG: at 08:04

## 2017-01-01 RX ADMIN — TRAMADOL HYDROCHLORIDE 50 MG: 50 TABLET, FILM COATED ORAL at 00:03

## 2017-01-01 RX ADMIN — Medication 50 MG: at 08:22

## 2017-01-01 RX ADMIN — VANCOMYCIN HYDROCHLORIDE 1500 MG: 10 INJECTION, POWDER, LYOPHILIZED, FOR SOLUTION INTRAVENOUS at 14:55

## 2017-01-01 RX ADMIN — DRONABINOL 2.5 MG: 2.5 CAPSULE ORAL at 07:29

## 2017-01-01 RX ADMIN — METRONIDAZOLE 500 MG: 500 TABLET ORAL at 04:49

## 2017-01-01 RX ADMIN — METRONIDAZOLE 500 MG: 500 TABLET ORAL at 18:21

## 2017-01-01 RX ADMIN — MAGNESIUM SULFATE HEPTAHYDRATE: 500 INJECTION, SOLUTION INTRAMUSCULAR; INTRAVENOUS at 17:46

## 2017-01-01 RX ADMIN — CEFEPIME HYDROCHLORIDE 2 G: 2 INJECTION, POWDER, FOR SOLUTION INTRAVENOUS at 08:21

## 2017-01-01 RX ADMIN — CEFEPIME HYDROCHLORIDE 2 G: 2 INJECTION, POWDER, FOR SOLUTION INTRAVENOUS at 00:03

## 2017-01-01 RX ADMIN — IOPAMIDOL 100 ML: 755 INJECTION, SOLUTION INTRAVENOUS at 12:17

## 2017-01-01 RX ADMIN — PANTOPRAZOLE SODIUM 40 MG: 40 TABLET, DELAYED RELEASE ORAL at 08:29

## 2017-01-01 RX ADMIN — Medication 800 MG: at 08:05

## 2017-01-01 RX ADMIN — DEXAMETHASONE SODIUM PHOSPHATE 4 MG: 4 INJECTION, SOLUTION INTRA-ARTICULAR; INTRALESIONAL; INTRAMUSCULAR; INTRAVENOUS; SOFT TISSUE at 15:45

## 2017-01-01 RX ADMIN — MAGNESIUM SULFATE HEPTAHYDRATE 3 G: 500 INJECTION, SOLUTION INTRAMUSCULAR; INTRAVENOUS at 11:14

## 2017-01-01 RX ADMIN — LOPERAMIDE HYDROCHLORIDE 2 MG: 2 CAPSULE ORAL at 09:09

## 2017-01-01 RX ADMIN — METRONIDAZOLE 500 MG: 500 TABLET ORAL at 04:37

## 2017-01-01 RX ADMIN — FLUOROURACIL 475 MG: 50 INJECTION, SOLUTION INTRAVENOUS at 16:58

## 2017-01-01 RX ADMIN — SODIUM CHLORIDE 1000 ML: 900 INJECTION, SOLUTION INTRAVENOUS at 17:00

## 2017-01-01 RX ADMIN — ATROPINE SULFATE 0.4 MG: 0.4 INJECTION, SOLUTION INTRAMUSCULAR; INTRAVENOUS; SUBCUTANEOUS at 13:07

## 2017-01-01 RX ADMIN — ACETAMINOPHEN 650 MG: 325 TABLET ORAL at 23:06

## 2017-01-01 RX ADMIN — DEXAMETHASONE SODIUM PHOSPHATE 10 MG: 10 INJECTION INTRAMUSCULAR; INTRAVENOUS at 09:21

## 2017-01-01 RX ADMIN — TRAMADOL HYDROCHLORIDE 50 MG: 50 TABLET, FILM COATED ORAL at 08:50

## 2017-01-01 RX ADMIN — ENOXAPARIN SODIUM 40 MG: 40 INJECTION SUBCUTANEOUS at 09:25

## 2017-01-01 RX ADMIN — ATROPINE SULFATE 0.4 MG: 0.4 INJECTION, SOLUTION INTRAMUSCULAR; INTRAVENOUS; SUBCUTANEOUS at 13:10

## 2017-01-01 RX ADMIN — DIATRIZOATE MEGLUMINE AND DIATRIZOATE SODIUM 15 ML: 660; 100 LIQUID ORAL; RECTAL at 18:13

## 2017-01-01 RX ADMIN — SODIUM CHLORIDE 100 ML: 900 INJECTION, SOLUTION INTRAVENOUS at 11:13

## 2017-01-01 RX ADMIN — SODIUM CHLORIDE 75 ML/HR: 900 INJECTION, SOLUTION INTRAVENOUS at 16:51

## 2017-01-01 RX ADMIN — TRAMADOL HYDROCHLORIDE 50 MG: 50 TABLET, FILM COATED ORAL at 09:35

## 2017-01-01 RX ADMIN — LOPERAMIDE HYDROCHLORIDE 4 MG: 2 CAPSULE ORAL at 09:23

## 2017-01-01 RX ADMIN — LEVOFLOXACIN 500 MG: 500 TABLET, FILM COATED ORAL at 16:03

## 2017-01-01 RX ADMIN — SOYBEAN OIL 500 ML: 20 INJECTION, SOLUTION INTRAVENOUS at 18:25

## 2017-01-01 RX ADMIN — Medication 10 ML: at 10:10

## 2017-01-01 RX ADMIN — DRONABINOL 2.5 MG: 2.5 CAPSULE ORAL at 17:03

## 2017-01-01 RX ADMIN — SODIUM CHLORIDE 100 ML: 900 INJECTION, SOLUTION INTRAVENOUS at 07:07

## 2017-01-01 RX ADMIN — DEXAMETHASONE SODIUM PHOSPHATE 10 MG: 10 INJECTION INTRAMUSCULAR; INTRAVENOUS at 12:40

## 2017-01-01 RX ADMIN — DEXAMETHASONE SODIUM PHOSPHATE 10 MG: 10 INJECTION INTRAMUSCULAR; INTRAVENOUS at 14:15

## 2017-01-01 RX ADMIN — IRINOTECAN HYDROCHLORIDE 178 MG: 100 INJECTION, SOLUTION INTRAVENOUS at 13:39

## 2017-01-01 RX ADMIN — Medication 10 ML: at 11:29

## 2017-01-01 RX ADMIN — ATROPINE SULFATE 0.4 MG: 0.4 INJECTION, SOLUTION INTRAMUSCULAR; INTRAVENOUS; SUBCUTANEOUS at 13:26

## 2017-01-01 RX ADMIN — CEFEPIME HYDROCHLORIDE 2 G: 2 INJECTION, POWDER, FOR SOLUTION INTRAVENOUS at 08:28

## 2017-01-01 RX ADMIN — Medication 10 ML: at 15:45

## 2017-01-01 RX ADMIN — SODIUM CHLORIDE, PRESERVATIVE FREE 500 UNITS: 5 INJECTION INTRAVENOUS at 15:35

## 2017-01-01 RX ADMIN — Medication 10 ML: at 15:55

## 2017-01-01 RX ADMIN — Medication 2 MG: at 00:57

## 2017-01-01 RX ADMIN — IOPAMIDOL 100 ML: 755 INJECTION, SOLUTION INTRAVENOUS at 09:58

## 2017-01-01 RX ADMIN — BEVACIZUMAB 409 MG: 400 INJECTION, SOLUTION INTRAVENOUS at 08:30

## 2017-01-01 RX ADMIN — BEVACIZUMAB 409 MG: 400 INJECTION, SOLUTION INTRAVENOUS at 12:50

## 2017-01-01 RX ADMIN — SODIUM CHLORIDE 100 ML/HR: 450 INJECTION, SOLUTION INTRAVENOUS at 11:20

## 2017-01-01 RX ADMIN — ATROPINE SULFATE 0.4 MG: 0.4 INJECTION, SOLUTION INTRAMUSCULAR; INTRAVENOUS; SUBCUTANEOUS at 10:06

## 2017-01-01 RX ADMIN — MORPHINE SULFATE 2 MG: 2 INJECTION, SOLUTION INTRAMUSCULAR; INTRAVENOUS at 17:42

## 2017-01-01 RX ADMIN — IOPAMIDOL 100 ML: 755 INJECTION, SOLUTION INTRAVENOUS at 11:13

## 2017-01-01 RX ADMIN — MELOXICAM 7.5 MG: 7.5 TABLET ORAL at 19:32

## 2017-01-01 RX ADMIN — SODIUM CHLORIDE 100 ML/HR: 450 INJECTION, SOLUTION INTRAVENOUS at 00:33

## 2017-01-01 RX ADMIN — Medication 10 ML: at 14:11

## 2017-01-01 RX ADMIN — PANITUMUMAB 489.6 MG: 400 SOLUTION INTRAVENOUS at 09:34

## 2017-01-01 RX ADMIN — IRINOTECAN HYDROCHLORIDE 178 MG: 100 INJECTION, SOLUTION INTRAVENOUS at 14:45

## 2017-01-01 RX ADMIN — TRAMADOL HYDROCHLORIDE 50 MG: 50 TABLET, FILM COATED ORAL at 18:30

## 2017-01-01 RX ADMIN — ASCORBIC ACID, VITAMIN A PALMITATE, CHOLECALCIFEROL, THIAMINE HYDROCHLORIDE, RIBOFLAVIN-5 PHOSPHATE SODIUM, PYRIDOXINE HYDROCHLORIDE, NIACINAMIDE, DEXPANTHENOL, ALPHA-TOCOPHEROL ACETATE, VITAMIN K1, FOLIC ACID, BIOTIN, CYANOCOBALAMIN: 200; 3300; 200; 6; 3.6; 6; 40; 15; 10; 150; 600; 60; 5 INJECTION, SOLUTION INTRAVENOUS at 18:28

## 2017-01-01 RX ADMIN — LEUCOVORIN CALCIUM 475 MG: 500 INJECTION, POWDER, LYOPHILIZED, FOR SOLUTION INTRAMUSCULAR; INTRAVENOUS at 13:30

## 2017-01-01 RX ADMIN — DEXTROSE MONOHYDRATE 25 ML/HR: 5 INJECTION, SOLUTION INTRAVENOUS at 09:05

## 2017-01-01 RX ADMIN — FAMOTIDINE 20 MG: 20 TABLET, FILM COATED ORAL at 07:46

## 2017-01-01 RX ADMIN — PANTOPRAZOLE SODIUM 40 MG: 40 INJECTION, POWDER, FOR SOLUTION INTRAVENOUS at 16:01

## 2017-01-01 RX ADMIN — PANTOPRAZOLE SODIUM 40 MG: 40 TABLET, DELAYED RELEASE ORAL at 07:29

## 2017-01-01 RX ADMIN — IRINOTECAN HYDROCHLORIDE 178 MG: 100 INJECTION, SOLUTION INTRAVENOUS at 13:26

## 2017-01-01 RX ADMIN — Medication 50 MG: at 09:17

## 2017-01-01 RX ADMIN — Medication 20 ML: at 13:26

## 2017-01-01 RX ADMIN — FLUOROURACIL 633.5 MG: 50 INJECTION, SOLUTION INTRAVENOUS at 15:30

## 2017-01-01 RX ADMIN — ATROPINE SULFATE 0.4 MG: 0.4 INJECTION, SOLUTION INTRAMUSCULAR; INTRAVENOUS; SUBCUTANEOUS at 14:18

## 2017-01-01 RX ADMIN — PANTOPRAZOLE SODIUM 40 MG: 40 INJECTION, POWDER, FOR SOLUTION INTRAVENOUS at 08:29

## 2017-01-01 RX ADMIN — LEUCOVORIN CALCIUM 634 MG: 350 INJECTION, POWDER, LYOPHILIZED, FOR SOLUTION INTRAMUSCULAR; INTRAVENOUS at 13:25

## 2017-01-01 RX ADMIN — SODIUM CHLORIDE, PRESERVATIVE FREE 500 UNITS: 5 INJECTION INTRAVENOUS at 15:55

## 2017-01-01 RX ADMIN — DIPHENHYDRAMINE HYDROCHLORIDE 50 MG: 50 INJECTION, SOLUTION INTRAMUSCULAR; INTRAVENOUS at 09:15

## 2017-01-01 RX ADMIN — SODIUM CHLORIDE, PRESERVATIVE FREE 10 ML: 5 INJECTION INTRAVENOUS at 21:41

## 2017-01-01 RX ADMIN — PROCHLORPERAZINE MALEATE 10 MG: 10 TABLET, FILM COATED ORAL at 20:04

## 2017-01-01 RX ADMIN — BEVACIZUMAB 409 MG: 400 INJECTION, SOLUTION INTRAVENOUS at 13:30

## 2017-01-01 RX ADMIN — PANTOPRAZOLE SODIUM 40 MG: 40 TABLET, DELAYED RELEASE ORAL at 07:57

## 2017-01-01 RX ADMIN — CEFEPIME HYDROCHLORIDE 2 G: 2 INJECTION, POWDER, FOR SOLUTION INTRAVENOUS at 16:01

## 2017-01-01 RX ADMIN — DEXTROSE MONOHYDRATE 25 ML/HR: 5 INJECTION, SOLUTION INTRAVENOUS at 12:55

## 2017-01-01 RX ADMIN — IRINOTECAN HYDROCHLORIDE 238 MG: 100 INJECTION, SOLUTION INTRAVENOUS at 10:50

## 2017-01-01 RX ADMIN — SODIUM CHLORIDE 500 ML: 900 INJECTION, SOLUTION INTRAVENOUS at 07:55

## 2017-01-01 RX ADMIN — SODIUM CHLORIDE 100 ML: 900 INJECTION, SOLUTION INTRAVENOUS at 14:11

## 2017-01-01 RX ADMIN — MIDAZOLAM HYDROCHLORIDE 4 MG: 1 INJECTION, SOLUTION INTRAMUSCULAR; INTRAVENOUS at 15:19

## 2017-01-01 RX ADMIN — SODIUM CHLORIDE, PRESERVATIVE FREE 500 UNITS: 5 INJECTION INTRAVENOUS at 11:30

## 2017-01-01 RX ADMIN — LEUCOVORIN CALCIUM 475 MG: 350 INJECTION, POWDER, LYOPHILIZED, FOR SOLUTION INTRAMUSCULAR; INTRAVENOUS at 12:43

## 2017-01-01 RX ADMIN — PHENOBARBITAL SODIUM 65 MG: 65 INJECTION INTRAMUSCULAR; INTRAVENOUS at 21:40

## 2017-01-01 RX ADMIN — IRINOTECAN HYDROCHLORIDE 178 MG: 100 INJECTION, SOLUTION INTRAVENOUS at 15:11

## 2017-01-01 RX ADMIN — ONDANSETRON 8 MG: 8 TABLET, ORALLY DISINTEGRATING ORAL at 07:44

## 2017-01-01 RX ADMIN — BEVACIZUMAB 409 MG: 400 INJECTION, SOLUTION INTRAVENOUS at 09:36

## 2017-01-01 RX ADMIN — Medication 10 ML: at 11:13

## 2017-01-01 RX ADMIN — IRINOTECAN HYDROCHLORIDE 238 MG: 100 INJECTION, SOLUTION INTRAVENOUS at 14:30

## 2017-01-01 RX ADMIN — METRONIDAZOLE 500 MG: 500 INJECTION, SOLUTION INTRAVENOUS at 08:54

## 2017-01-01 RX ADMIN — BEVACIZUMAB 409 MG: 400 INJECTION, SOLUTION INTRAVENOUS at 15:38

## 2017-01-01 RX ADMIN — Medication 800 MG: at 19:32

## 2017-01-01 RX ADMIN — LEUCOVORIN CALCIUM 634 MG: 350 INJECTION, POWDER, LYOPHILIZED, FOR SOLUTION INTRAMUSCULAR; INTRAVENOUS at 13:24

## 2017-01-01 RX ADMIN — DEXTROSE MONOHYDRATE 25 ML/HR: 5 INJECTION, SOLUTION INTRAVENOUS at 13:25

## 2017-01-01 RX ADMIN — ASCORBIC ACID, VITAMIN A PALMITATE, CHOLECALCIFEROL, THIAMINE HYDROCHLORIDE, RIBOFLAVIN-5 PHOSPHATE SODIUM, PYRIDOXINE HYDROCHLORIDE, NIACINAMIDE, DEXPANTHENOL, ALPHA-TOCOPHEROL ACETATE, VITAMIN K1, FOLIC ACID, BIOTIN, CYANOCOBALAMIN: 200; 3300; 200; 6; 3.6; 6; 40; 15; 10; 150; 600; 60; 5 INJECTION, SOLUTION INTRAVENOUS at 17:58

## 2017-01-01 RX ADMIN — PANTOPRAZOLE SODIUM 40 MG: 40 TABLET, DELAYED RELEASE ORAL at 08:05

## 2017-01-01 RX ADMIN — LEVOFLOXACIN 500 MG: 500 TABLET, FILM COATED ORAL at 18:19

## 2017-01-01 RX ADMIN — TRAMADOL HYDROCHLORIDE 50 MG: 50 TABLET, FILM COATED ORAL at 04:09

## 2017-01-01 RX ADMIN — FENTANYL CITRATE 50 MCG: 50 INJECTION, SOLUTION INTRAMUSCULAR; INTRAVENOUS at 15:36

## 2017-01-01 RX ADMIN — CIPROFLOXACIN 400 MG: 2 INJECTION, SOLUTION INTRAVENOUS at 03:43

## 2017-01-01 RX ADMIN — Medication 50 MG: at 07:58

## 2017-01-01 RX ADMIN — MELOXICAM 7.5 MG: 7.5 TABLET ORAL at 09:17

## 2017-01-01 RX ADMIN — SODIUM CHLORIDE 500 ML: 900 INJECTION, SOLUTION INTRAVENOUS at 12:33

## 2017-01-01 RX ADMIN — SODIUM CHLORIDE, PRESERVATIVE FREE 500 UNITS: 5 INJECTION INTRAVENOUS at 12:57

## 2017-01-01 RX ADMIN — Medication 10 ML: at 20:57

## 2017-01-01 RX ADMIN — DOCUSATE SODIUM 200 MG: 100 CAPSULE, LIQUID FILLED ORAL at 09:58

## 2017-01-01 RX ADMIN — DEXAMETHASONE SODIUM PHOSPHATE 10 MG: 10 INJECTION INTRAMUSCULAR; INTRAVENOUS at 09:33

## 2017-01-01 RX ADMIN — SODIUM CHLORIDE 100 ML/HR: 450 INJECTION, SOLUTION INTRAVENOUS at 10:57

## 2017-01-01 RX ADMIN — DIATRIZOATE MEGLUMINE AND DIATRIZOATE SODIUM 15 ML: 660; 100 LIQUID ORAL; RECTAL at 11:13

## 2017-01-01 RX ADMIN — CEFEPIME HYDROCHLORIDE 2 G: 2 INJECTION, POWDER, FOR SOLUTION INTRAVENOUS at 08:55

## 2017-01-01 RX ADMIN — ONDANSETRON 8 MG: 2 INJECTION INTRAMUSCULAR; INTRAVENOUS at 09:31

## 2017-01-01 RX ADMIN — LACTULOSE 30 G: 20 SOLUTION ORAL at 20:04

## 2017-01-01 RX ADMIN — DOCUSATE SODIUM 200 MG: 100 CAPSULE, LIQUID FILLED ORAL at 07:46

## 2017-01-01 RX ADMIN — SODIUM CHLORIDE, PRESERVATIVE FREE 500 UNITS: 5 INJECTION INTRAVENOUS at 17:05

## 2017-01-01 RX ADMIN — FLUOROURACIL 475 MG: 50 INJECTION, SOLUTION INTRAVENOUS at 11:12

## 2017-01-01 RX ADMIN — ONDANSETRON 8 MG: 2 INJECTION INTRAMUSCULAR; INTRAVENOUS at 13:07

## 2017-01-01 RX ADMIN — IRINOTECAN HYDROCHLORIDE 238 MG: 100 INJECTION, SOLUTION INTRAVENOUS at 10:59

## 2017-01-01 RX ADMIN — LEVOFLOXACIN 500 MG: 5 INJECTION, SOLUTION INTRAVENOUS at 13:25

## 2017-01-01 RX ADMIN — FLUOROURACIL 633.5 MG: 50 INJECTION, SOLUTION INTRAVENOUS at 12:35

## 2017-01-01 RX ADMIN — PANTOPRAZOLE SODIUM 40 MG: 40 TABLET, DELAYED RELEASE ORAL at 07:36

## 2017-01-01 RX ADMIN — IRINOTECAN HYDROCHLORIDE 178 MG: 100 INJECTION, SOLUTION INTRAVENOUS at 13:56

## 2017-01-01 RX ADMIN — ASCORBIC ACID, VITAMIN A PALMITATE, CHOLECALCIFEROL, THIAMINE HYDROCHLORIDE, RIBOFLAVIN-5 PHOSPHATE SODIUM, PYRIDOXINE HYDROCHLORIDE, NIACINAMIDE, DEXPANTHENOL, ALPHA-TOCOPHEROL ACETATE, VITAMIN K1, FOLIC ACID, BIOTIN, CYANOCOBALAMIN: 200; 3300; 200; 6; 3.6; 6; 40; 15; 10; 150; 600; 60; 5 INJECTION, SOLUTION INTRAVENOUS at 18:30

## 2017-01-01 RX ADMIN — LEVOFLOXACIN 500 MG: 5 INJECTION, SOLUTION INTRAVENOUS at 13:19

## 2017-01-01 RX ADMIN — ENOXAPARIN SODIUM 40 MG: 40 INJECTION SUBCUTANEOUS at 09:38

## 2017-01-01 RX ADMIN — Medication 10 ML: at 07:07

## 2017-01-01 RX ADMIN — ONDANSETRON 8 MG: 2 INJECTION INTRAMUSCULAR; INTRAVENOUS at 14:14

## 2017-01-01 RX ADMIN — DEXAMETHASONE SODIUM PHOSPHATE 10 MG: 10 INJECTION INTRAMUSCULAR; INTRAVENOUS at 10:17

## 2017-01-01 RX ADMIN — CEFEPIME HYDROCHLORIDE 2 G: 2 INJECTION, POWDER, FOR SOLUTION INTRAVENOUS at 18:34

## 2017-01-01 RX ADMIN — SODIUM CHLORIDE 75 ML/HR: 900 INJECTION, SOLUTION INTRAVENOUS at 07:47

## 2017-01-01 RX ADMIN — DIATRIZOATE MEGLUMINE AND DIATRIZOATE SODIUM 15 ML: 660; 100 LIQUID ORAL; RECTAL at 09:59

## 2017-01-01 RX ADMIN — METRONIDAZOLE 500 MG: 500 TABLET ORAL at 18:22

## 2017-01-01 RX ADMIN — SOYBEAN OIL 21 ML/HR: 20 INJECTION, SOLUTION INTRAVENOUS at 10:06

## 2017-01-01 RX ADMIN — Medication 400 MG: at 22:00

## 2017-01-01 RX ADMIN — Medication 2 MG: at 20:12

## 2017-01-01 RX ADMIN — ONDANSETRON 8 MG: 2 INJECTION INTRAMUSCULAR; INTRAVENOUS at 13:05

## 2017-01-01 RX ADMIN — LEUCOVORIN CALCIUM 475 MG: 200 INJECTION, POWDER, LYOPHILIZED, FOR SOLUTION INTRAMUSCULAR; INTRAVENOUS at 09:10

## 2017-01-01 RX ADMIN — SODIUM CHLORIDE 500 ML: 900 INJECTION, SOLUTION INTRAVENOUS at 09:17

## 2017-01-01 RX ADMIN — SODIUM CHLORIDE 125 ML/HR: 900 INJECTION, SOLUTION INTRAVENOUS at 18:03

## 2017-01-01 RX ADMIN — LEUCOVORIN CALCIUM 634 MG: 350 INJECTION, POWDER, LYOPHILIZED, FOR SOLUTION INTRAMUSCULAR; INTRAVENOUS at 10:30

## 2017-01-01 RX ADMIN — SODIUM CHLORIDE, SODIUM LACTATE, POTASSIUM CHLORIDE, AND CALCIUM CHLORIDE 125 ML/HR: 600; 310; 30; 20 INJECTION, SOLUTION INTRAVENOUS at 14:20

## 2017-01-10 NOTE — PROGRESS NOTES
Pt is here for FUP S/P chemo and has not had RT to his liver mets. Pt stated his strength is still low, but he denies pain, N/V, and he said he is having normal bowel movements. Pt will be presented at the [facility] [place] tomorrow per Dr. Ben Browne coordinator was called, and form faxed. Pt. will return in one week for FUP to go over treatment decisions.
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
## 2017-01-11 NOTE — PROGRESS NOTES
Multidisciplinary Cancer Conference Worksheet    [facility] Date: 2017  Patient Name: Ezekiel Shannon  : 1950  Age: 78 y.o. MRN: [id number]  Presenting MD: Pal Duke Presenting MD:Meg Carballo     Reason for case presentation: discussion of current findings on CT from 17    [facility]: GI/General Case Presentation Type:Recurrence FU ECO       Pathology Report: Last surgery was Oct 2016  Type: NA Size:NA      Grade Margins Nodes ER DE HER2 [id number]   NA NA NA NA NA NA NA            BRAF KRAS MSI EGFR   NA NA NA NA       Pathological Stage: T  N  M Stage Group   Clinical Stage:  T  N M Stage Group IV       Smoking History:   History   Smoking Status    Never Smoker   Smokeless Tobacco    Never Used      Family History:   Family History   Problem Relation Age of Onset    Cancer Mother      lung        RECOMMENDATIONS FROM [address] treatment recommendations are not intended to replace the independent medical/professional judgement of the patient's attending physician. The final decision for treatment rests between the patient and his/her physician. Recommend treatment for consideration:  Recommendations from NCCN Guidelines:    Referred To: Radiation Oncology SBRT  for new lesions.  Pt is not a surgical candidate per Dr. Leana Magaña recommendations are based on prognositc indicators and treatment guidelines listed below:  NCCN Guidelines and Tumor Board discussion    Additional Notes: reviewed pathology from 2016 and new findings on current CT from 17    Signed By: Mateo Sutherland     2017

## 2017-03-14 NOTE — NURSE NAVIGATOR
Pt here for f/u for colon cancer, liver mets. S/p SBRT to liver lesions. S/p surgery October 2016. F/u Dr. Flakita Monroy 3-25-17. Pt starting back on chemo.     Adam Ma RN
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
## 2017-03-20 NOTE — PROGRESS NOTES
Pt. Discharged ambulatory accompanied by self. Tolerated infusion well. No distress noted. Continuous chemotherapy pump in place and infusing without difficulty. To return to Infusions on 3/22/17.

## 2017-03-22 NOTE — PROGRESS NOTES
Arrived to the [facility]. CADD pump disconnected. Line flushed, positive blood return. Line packed with heparin, and needle removed. Patient tolerated without difficulty. Any issues or concerns during appointment: none. Patient aware of next infusion appointment on 4/3 (date) at [address] [place] [address] (time). Discharged ambulatory to home.

## 2017-04-03 NOTE — PROGRESS NOTES
Pt arrived ambulatory today at 0850, to have IV chemotherapy. Pt tolerated without difficulty. Patient discharged via ambulatory accompanied by spouse. Instructed to notify physician of any problems, questions or concerns. Allowed opportunity for patient/family to ask questions. Verbalized understanding. Next appointment is April 5 at 1130 with Neptali Luis.

## 2017-04-03 NOTE — PROGRESS NOTES
Problem: Knowledge Deficit  Goal: *Verbalizes understanding of procedures and medications  Outcome: Progressing Towards Goal  Verbalizes/demonstrates understanding of purpose/procedure/potential side effects of avastin/leucovorin/irinotecan/fluorouracil.

## 2017-04-05 NOTE — PROGRESS NOTES
Arrived to the [facility]. Fluorouracil pump completed. Patient tolerated well. Any issues or concerns during appointment: none. Patient aware of next infusion appointment on 04/17  at 1100. Discharged ambulatory.

## 2017-04-20 NOTE — PROGRESS NOTES
Arrived to the [facility]. Chemo completed. Patient tolerated well. Continuous chemotherapy CADD pump verified with Alvarez Goldman RN . Cassette contains flourouracil medication with 115 ml residual volume, infusing at 2.5 ml/hr for 46 hours. Pump in Lock Level 2. Reviewed pump education with patient and patient instructed to call [employer] with any pump issues. Verbal understanding obtained. Pump connected and patient scheduled for 4/22 at 1600 to return for pump removal.  Any issues or concerns during appointment: None. Patient aware of next infusion appointment on 4/22 (date) at 1600 (time). Discharged ambulatory in stable condition.

## 2017-04-22 NOTE — PROGRESS NOTES
Arrived to the [facility]. CADD discontinued. Pt c/o some nausea, but refused any fluids, Port packed with heparin and deaccessed. Patient tolerated without difficulty. Any issues or concerns during appointment: none. Patient aware of next infusion appointment on 5/1 (date) at 1:15 (time). Discharged ambulatory to home.

## 2017-05-01 NOTE — PROGRESS NOTES
Arrived to the [facility]. Avastin/FOLFIRI completed. Patient tolerated well. Any issues or concerns during appointment: Order to proceed with plt count of 95. Patient aware of next infusion appointment on May 3rd at 1530  Discharged ambulatory.

## 2017-05-03 NOTE — PROGRESS NOTES
Arrived to the [facility]. Fluorouracil pump completed. Patient tolerated well. Any issues or concerns during appointment: Port needle removed with bleeding at insertion sight. Pressure applied and bleeding stopped. Pressure dressing applied. Patient aware of next infusion appointment on 05/15 at 0800 . Discharged ambulatory.

## 2017-05-15 NOTE — PROGRESS NOTES
Tolerated chemotherapy without difficulty today. Continuous chemotherapy CADD pump verified with Leanna Patel RN . Cassette contains Fluorouracil medication with 115 ml residual volume, infusing at 2.5 ml/hr for 46 hours. Pump in Lock Level 2. Reviewed pump education with patient and patient instructed to call [employer] with any pump issues. Verbal understanding obtained. Pump connected and patient scheduled for 05/17/17 at 1315 to return for pump removal.  Patient discharged via ambulation accompanied by self. Instructed to notify physician of any problems, questions or concerns. Allowed opportunity for patient/family to ask questions. Verbalized understanding.

## 2017-05-15 NOTE — PROGRESS NOTES
Problem: Chemotherapy Treatment  Goal: *Hemodynamically stable  Outcome: Progressing Towards Goal  Reviewed chemotherapy POC with patient. Verbalizes understanding.

## 2017-05-17 NOTE — PROGRESS NOTES
Arrived to the [facility]. Fluorouracil pump completed. Patient tolerated well. Any issues or concerns during appointment: Insomnia-patient to try Benadryl . Metallic taste-Patient to try lemon drops  Patient aware of next infusion appointment on 05/30  at 0815 . Discharged ambulatory.

## 2017-05-30 NOTE — PROGRESS NOTES
Pt arrived ambulatory after OV for C53 D15 Avastin, C100, D1 folfiri, he denies new complaints. Pt tolerated infusion without incident, positive blood return before/during/after IVP. 5FU CIVI pump secured and set to run. Pt dc'd stable, to return to [facility] on 6/1 at 1600.

## 2017-06-01 NOTE — PROGRESS NOTES
Arrived to the [facility]. 5 FU pump d/c'ed. Patient tolerated well.   Any issues or concerns during appointment: No.  Patient aware of next infusion appointment on Tuesday,June 13th @ 200  Discharged home ambulatory

## 2017-06-02 PROBLEM — E86.0 DEHYDRATION: Status: ACTIVE | Noted: 2017-01-01

## 2017-06-05 PROBLEM — K56.609 SMALL BOWEL OBSTRUCTION (HCC): Status: ACTIVE | Noted: 2017-01-01

## 2017-06-05 NOTE — IP AVS SNAPSHOT
[address] 
[phone] Patient: Desiree Palacios MRN: [id number] PDW:5/0/9930 You are allergic to the following Allergen Reactions Oxaliplatin Shortness of Breath Rash Other (comments) Severe abdominal pain Recent Documentation Weight BMI Smoking Status 78.6 kg 26.33 kg/m2 Never Smoker Emergency Contacts Name Discharge Info Relation Home Work Mobile Tamme 62 CAREGIVER [3] Spouse [3] [id number] Dionicio Cash CAREGIVER [3] Brother [24] [phone] About your hospitalization You were admitted on:  June 5, 2017 You last received care in the:  [address] You were discharged on:  Lacey 10, 2017 Unit phone number:  [phone] Why you were hospitalized Your primary diagnosis was:  Small Bowel Obstruction (Hcc) Your diagnoses also included:  Metastatic Colon Cancer To Liver (Hcc) Providers Seen During Your Hospitalizations Provider Role Specialty Primary office phone Jose David Falcon MD Attending Provider Internal Medicine [phone] Landen Gupta MD Attending Provider Hematology and Oncology [phone] Your Primary Care Physician (PCP) Primary Care Physician Office Phone Office Fax 506 [facility], [address] [place] [address] Follow-up Information Follow up With Details Comments Contact Info Efren Manning MD   [address] [facility] [address] [phone] Landen Gupta MD  E-mailed appointment 6-10-17,1:02pm to [facility]  appointment to Deni Mortensen and [address] 10[address] [facility] [address] 
[phone] Your Appointments Tuesday June 13, 2017  8:30 AM EDT  
LAB with [address]  
9093 [facility] OUTREACH INSURANCE [address] ) Farrukh Garcia 426 [address] [facility]  
[phone] Tuesday June 13, 2017  9:00 AM EDT PreChemo Follow Up with [facility] long term NP1 [facility] Hematology and Oncology [facility]) C/ Jacinto Lozada [address] [facility] [address]  
[phone] Tuesday June 13, 2017 11:15 AM EDT Chemo with NUR5  
ST. [address] ([address]) [address] Mo Jerez [phone] [facility] [address]  
[phone] [address] Wednesday June 14, 2017  1:00 PM EDT  
[facility] OFFICE VISIT with PAYTON Quintanilla [address]) [address] [employer] [address] [facility] [address]  
[phone] Thursday Lacey 15, 2017  4:00 PM EDT Chemo with [address] ) [address] Mo Jerez [phone] [facility] [address]  
[phone] [address] Monday June 26, 2017 10:15 AM EDT  
LAB with [address]  
[facility] OUTREACH INSURANCE [address]) Farrukh Garcia [address] [facility]  
[phone] Monday June 26, 2017 10:45 AM EDT Follow Up with Ifeanyi Morgan MD  
[facility] Hematology and Oncology [facility]) C/ Jac[address] [facility] [address]  
[phone] Monday June 26, 2017 11:15 AM EDT Chemo with NUR5  
ST. [address] ([address]) [address] Mo Jerez [phone] [facility] [address]  
[phone] [address] Wednesday June 28, 2017  4:00 PM EDT Chemo with [address]) [address] Mo Jerez [phone] [facility] [address]  
[phone] [address] Current Discharge Medication List  
  
START taking these medications Dose & Instructions Dispensing Information Comments Morning Noon Evening Bedtime  
 ciprofloxacin HCl 500 mg tablet Commonly known as:  CIPRO Your last dose was: Your next dose is:    
   
   
 Dose:  500 mg Take 1 Tab by mouth two (2) times a day for 3 days. Quantity:  6 Tab Refills:  0  
     
   
   
   
  
 dronabinol 2.5 mg capsule Commonly known as:  Argurdeep George Your last dose was: Your next dose is:    
   
   
 Dose:  2.5 mg Take 1 Cap by mouth Before breakfast and dinner. Max Daily Amount: 5 mg. Quantity:  60 Cap Refills:  2 CONTINUE these medications which have NOT CHANGED Dose & Instructions Dispensing Information Comments Morning Noon Evening Bedtime  
 magnesium oxide 400 mg tablet Commonly known as:  MAG-OX Your last dose was: Your next dose is:    
   
   
 Dose:  800 mg Take 800 mg by mouth two (2) times a day. Refills:  0  
     
   
   
   
  
 multivitamin tablet Commonly known as:  ONE A DAY Your last dose was: Your next dose is:    
   
   
 Dose:  1 Tab Take 1 Tab by mouth daily. Refills:  0  
     
   
   
   
  
 omeprazole 20 mg capsule Commonly known as:  PriLOSEC Your last dose was: Your next dose is:    
   
   
 Dose:  20 mg Take 1 capsule by mouth daily. Quantity:  30 capsule Refills:  3  
     
   
   
   
  
 ondansetron hcl 8 mg tablet Commonly known as:  Louann Alberto Your last dose was: Your next dose is:    
   
   
 Dose:  8 mg Take 1 Tab by mouth every eight (8) hours as needed for Nausea. Quantity:  90 Tab Refills:  1  
     
   
   
   
  
 prochlorperazine 10 mg tablet Commonly known as:  COMPAZINE Your last dose was: Your next dose is: Dose:  10 mg Take 1 Tab by mouth every six (6) hours as needed. Quantity:  90 Tab Refills:  1  
     
   
   
   
  
 pyridoxine (vitamin B6) 50 mg tablet Commonly known as:  VITAMIN B-6 Your last dose was: Your next dose is:    
   
   
 Dose:  50 mg Take 1 Tab by mouth daily. Quantity:  100 Tab Refills:  3 STOOL SOFTENER PO Your last dose was: Your next dose is:    
   
   
 Dose:  2 Tab Take 2 Tabs by mouth as needed. Refills:  0 Where to Get Your Medications These medications were sent to [facility] [address],[place]. [address], 3501 [facility] 190 24 AT [address] [facility] [address] [facility], [address] [facility] [address] Phone:  [phone]  
  ciprofloxacin HCl 500 mg tablet Information on where to get these meds will be given to you by the nurse or doctor. ! Ask your nurse or doctor about these medications  
  dronabinol 2.5 mg capsule Discharge Instructions [facility] 
                                                           GENERAL DISCHARGE INSTRUCTIONS 1. If you have any signs or symptoms of infection, or you have recently been diagnosed and treated for an infection, contact your physician prior to receiving Remicade. 2. Contact your doctor right away if you have signs of infection, such as fever, chills, sore throat, flu symptoms. 3. Contact your doctor right away if you have easy bruising or bleeding, unusually pale skin, or unusual weakness. 4.  Check with your physician before receiving a \"live\" vaccine during therapy due to a possible decrease in ability to fight infections. 5.  Avoid contact with individuals with known infections. 6.   Wash hands frequently. 7.  All medications can potentially cause side effects.   Possible general side effects may include · Mild abdominal pain, fatigue or headache; 
·  Stuffy nose, sinus pain or mild skin rash; 
·  Mild joint pain or swelling, malaise/fatigue; ·  Mild hair loss. 6.  Signs/Symptoms of an allergic reaction may require immediate medical attention: 
  
·  Skin - Redness, itching, swelling, blistering, weeping, crusting, rash,   eruptions, or hives; 
·  Lungs - Wheezing, cough, or shortness of breath; 
·  Cardiac - changes in blood pressure, swelling of extremities,   unexplained weakness, chest pain or tightness; ·  Swelling of the face, eyelids, lips, tongue, or throat;  severe headache; 
·  Stuffy nose, runny nose (clear, thin discharge), sneezing;  
·  Red (bloodshot), itchy, swollen, or watery eyes; ·  Stomach  pain, nausea, vomiting, or bloody diarrhea. Contact your physician's office with questions or concerns or if you experience any of the above symptoms. Sherren Lobos, Dirk                                                                           6/10/2017 Ada Pagan RN Dehydration: Care Instructions Your Care Instructions Dehydration happens when your body loses too much fluid. This might happen when you do not drink enough water or you lose large amounts of fluids from your body because of diarrhea, vomiting, or sweating. Severe dehydration can be life-threatening. Water and minerals called electrolytes help put your body fluids back in balance. Learn the early signs of fluid loss, and drink more fluids to prevent dehydration. Follow-up care is a key part of your treatment and safety. Be sure to make and go to all appointments, and call your doctor if you are having problems. It's also a good idea to know your test results and keep a list of the medicines you take. How can you care for yourself at home?  
· To prevent dehydration, drink plenty of fluids, enough so that your urine is light yellow or clear like water. Choose water and other caffeine-free clear liquids until you feel better. If you have kidney, heart, or liver disease and have to limit fluids, talk with your doctor before you increase the amount of fluids you drink. · If you do not feel like eating or drinking, try taking small sips of water, sports drinks, or other rehydration drinks. · Get plenty of rest. 
To prevent dehydration · Add more fluids to your diet and daily routine, unless your doctor has told you not to. · During hot weather, drink more fluids. Drink even more fluids if you exercise a lot. Stay away from drinks with alcohol or caffeine. · Watch for the symptoms of dehydration. These include: ¨ A dry, sticky mouth. ¨ Dark yellow urine, and not much of it. ¨ Dry and sunken eyes. ¨ Feeling very tired. · Learn what problems can lead to dehydration. These include: ¨ Diarrhea, fever, and vomiting. ¨ Any illness with a fever, such as pneumonia or the flu. ¨ Activities that cause heavy sweating, such as endurance races and heavy outdoor work in hot or humid weather. ¨ Alcohol or drug abuse or withdrawal. 
¨ Certain medicines, such as cold and allergy pills (antihistamines), diet pills (diuretics), and laxatives. ¨ Certain diseases, such as diabetes, cancer, and heart or kidney disease. When should you call for help? Call 911 anytime you think you may need emergency care. For example, call if: 
· You passed out (lost consciousness). Call your doctor now or seek immediate medical care if: 
· You are confused and cannot think clearly. · You are dizzy or lightheaded, or you feel like you may faint. · You have signs of needing more fluids. You have sunken eyes and a dry mouth, and you pass only a little dark urine. · You cannot keep fluids down. Watch closely for changes in your health, and be sure to contact your doctor if: 
· You are not making tears. · Your skin is very dry and sags slowly back into place after you pinch it. · Your mouth and eyes are very dry. Where can you learn more? Go to http://leni-sharmin.info/. Enter H672 in the search box to learn more about \"Dehydration: Care Instructions. \" Current as of: May 27, 2016 Content Version: 11.2 © 9189-4882 [employer]. Care instructions adapted under license by [employer] (which disclaims liability or warranty for this information). If you have questions about a medical condition or this instruction, always ask your healthcare professional. [place] [address] any warranty or liability for your use of this information. Discharge Orders None ACO Transitions of Care Introducing [employer] [address] offers a voluntary care coordination program to provide high quality service and care to [facility] fee-for-service beneficiaries. Juan F Woods was designed to help you enhance your health and well-being through the following services: ? Transitions of Care  support for individuals who are transitioning from one care setting to another (example: Hospital to home). ? Chronic and Complex Care Coordination  support for individuals and caregivers of those with serious or chronic illnesses or with more than one chronic (ongoing) condition and those who take a number of different medications. If you meet specific medical criteria, a [facility] [address] may call you directly to coordinate your care with your primary care physician and your other care providers. For questions about the [facility] [place] programs, please, contact your physicians office. For general questions or additional information about Accountable Care Organizations: 
Please visit www.medicare.gov/acos. html or call 1-800-MEDICARE (9-841.953.4547) TTY users should call 1-929.978.5092. Acupera Announcement We are excited to announce that we are making your provider's discharge notes available to you in Acupera. You will see these notes when they are completed and signed by the physician that discharged you from your recent hospital stay. If you have any questions or concerns about any information you see in Acupera, please call the Health Information Department where you were seen or reach out to your Primary Care Provider for more information about your plan of care. Introducing [facility]! Dear Martin Romero: Thank you for requesting a Acupera account. Our records indicate that you already have an active Acupera account. You can access your account anytime at [email or web address] Did you know that you can access your hospital and ER discharge instructions at any time in Acupera? You can also review all of your test results from your hospital stay or ER visit. Additional Information If you have questions, please visit the Frequently Asked Questions section of the Acupera website at [email or web address] Remember, Acupera is NOT to be used for urgent needs. For medical emergencies, dial 911. Now available from your iPhone and Android! General Information Please provide this summary of care documentation to your next provider. Patient Signature:  ____________________________________________________________ Date:  ____________________________________________________________  
  
Anamaria Lott Provider Signature:  ____________________________________________________________ Date:  ____________________________________________________________
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
## 2017-06-05 NOTE — PROGRESS NOTES
END OF SHIFT NOTE:    Intake/Output  06/05 0701 - 06/05 1900  In: 6204 [P.O.:1197]  Out: 700 [Urine:700]   Voiding: YES  Catheter: NO  Drain:      Stool:  5 occurrences. Stool Assessment  Stool Color: Tan (Comment) (06/05/17 1654)  Stool Appearance: Watery (06/05/17 1654)  Stool Amount: Other (Comment) (350 cc) (06/05/17 1654)  Stool Source/Status: Rectum (06/05/17 1654)    Emesis:  5 occurrences. VITAL SIGNS  Patient Vitals for the past 12 hrs:   Temp Pulse Resp BP SpO2   06/05/17 1515 97.8 °F (36.6 °C) 74 18 140/75 96 %   06/05/17 1100 97.7 °F (36.5 °C) 70 18 142/82 97 %   06/05/17 0720 97.7 °F (36.5 °C) 68 18 151/79 96 %             Pain Assessment  Pain 1  Pain Scale 1: Numeric (0 - 10) (06/05/17 0052)  Pain Intensity 1: 0 (06/05/17 0052)  Patient Stated Pain Goal: 0 (06/05/17 0052)    Ambulating  Yes. Pt can ambulate to bathroom on his own. However, pt is very week from diarrhea and not eating. Additional Information:     Pt advanced to full liquid diet today. Pt given Ensure and tolerated well. Pt has not c/o nausea today. Pt has slept a majority of the day. C-diff sample sent to lab and was negative. Shift report given to oncoming nurse at the bedside.     Андрей Miguel RN

## 2017-06-05 NOTE — IP AVS SNAPSHOT
Current Discharge Medication List  
  
START taking these medications Dose & Instructions Dispensing Information Comments Morning Noon Evening Bedtime  
 ciprofloxacin HCl 500 mg tablet Commonly known as:  CIPRO Your last dose was: Your next dose is:    
   
   
 Dose:  500 mg Take 1 Tab by mouth two (2) times a day for 3 days. Quantity:  6 Tab Refills:  0  
     
   
   
   
  
 dronabinol 2.5 mg capsule Commonly known as:  Reji Grijalvaley Your last dose was: Your next dose is:    
   
   
 Dose:  2.5 mg Take 1 Cap by mouth Before breakfast and dinner. Max Daily Amount: 5 mg. Quantity:  60 Cap Refills:  2 CONTINUE these medications which have NOT CHANGED Dose & Instructions Dispensing Information Comments Morning Noon Evening Bedtime  
 magnesium oxide 400 mg tablet Commonly known as:  MAG-OX Your last dose was: Your next dose is:    
   
   
 Dose:  800 mg Take 800 mg by mouth two (2) times a day. Refills:  0  
     
   
   
   
  
 multivitamin tablet Commonly known as:  ONE A DAY Your last dose was: Your next dose is:    
   
   
 Dose:  1 Tab Take 1 Tab by mouth daily. Refills:  0  
     
   
   
   
  
 omeprazole 20 mg capsule Commonly known as:  PriLOSEC Your last dose was: Your next dose is:    
   
   
 Dose:  20 mg Take 1 capsule by mouth daily. Quantity:  30 capsule Refills:  3  
     
   
   
   
  
 ondansetron hcl 8 mg tablet Commonly known as:  Roxy Melo Your last dose was: Your next dose is:    
   
   
 Dose:  8 mg Take 1 Tab by mouth every eight (8) hours as needed for Nausea. Quantity:  90 Tab Refills:  1  
     
   
   
   
  
 prochlorperazine 10 mg tablet Commonly known as:  COMPAZINE Your last dose was: Your next dose is:    
   
   
 Dose:  10 mg Take 1 Tab by mouth every six (6) hours as needed. Quantity:  90 Tab Refills:  1  
     
   
   
   
  
 pyridoxine (vitamin B6) 50 mg tablet Commonly known as:  VITAMIN B-6 Your last dose was: Your next dose is:    
   
   
 Dose:  50 mg Take 1 Tab by mouth daily. Quantity:  100 Tab Refills:  3 STOOL SOFTENER PO Your last dose was: Your next dose is:    
   
   
 Dose:  2 Tab Take 2 Tabs by mouth as needed. Refills:  0 Where to Get Your Medications These medications were sent to [address] [place] [address],[facility] [address], [address] [place] [address] AT [address] [place] [address] [address] [facility], [address] [place] [id number] Phone:  [phone]  
  ciprofloxacin HCl 500 mg tablet Information on where to get these meds will be given to you by the nurse or doctor. ! Ask your nurse or doctor about these medications  
  dronabinol 2.5 mg capsule
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
## 2017-06-06 NOTE — PROGRESS NOTES
Pt concerned about scant amount of blood in stool. Pt requesting \"specialist\". On call physician notified, waiting on return call.

## 2017-06-06 NOTE — PROGRESS NOTES
END OF SHIFT NOTE:     Intake/Output  06/06 0701 - 06/06 1900  In: 1330 [I.V.:1330]  Out: 875 [Urine:875]   Voiding: YES  Catheter: NO  Drain:       Stool: 8 occurrences. Watery stools. Scant blood in last stool at 18:20 per PCT. Stool Assessment  Stool Color: Tan (Comment) (06/05/17 1416)  Stool Appearance: Formed (06/05/17 1416)  Stool Amount: Large (06/05/17 1416)  Stool Source/Status: Rectum (06/05/17 1416)     Emesis: 0 occurrences. Pt has c/o nausea today but refuses antiemetics.        VITAL SIGNS  Patient Vitals for the past 12 hrs:    Temp Pulse Resp BP SpO2   06/06/17 1550 99.1 °F (37.3 °C) 81 18 116/52 95 %   06/06/17 1131 - 75 - 100/43 -   06/06/17 1111 98.1 °F (36.7 °C) - - (!) 144/114 98 %   06/06/17 0749 98 °F (36.7 °C) 73 18 111/52 97 %         Pain Assessment  Pain 1  Pain Scale 1: Visual (06/06/17 0224)  Pain Intensity 1: 0 (06/06/17 0224)  Patient Stated Pain Goal: 0 (06/06/17 0224)  Pain Reassessment 1: Yes (06/06/17 0224)  Pain Onset 1: continuous all over (06/04/17 2105)  Pain Location 1: Back (06/06/17 0133)  Pain Orientation 1: Other (comment) (06/04/17 2105)  Pain Description 1: Aching;Constant (06/06/17 0133)  Pain Intervention(s) 1: Medication (see MAR) (06/06/17 0133)     Ambulating  Yes     Additional Information: Pt graduated to soft GI diet today. Pt remains in bed most of the day c/o weakness.  Pt will drink Chocolate Ensure w/o having nausea.      Shift report given to oncoming nurse at the bedside.     Tino Daley RN

## 2017-06-06 NOTE — PROGRESS NOTES
Pt. Has had 4-5 watery stools today. Pt has been flushing his stool and was reminded that we need to track his BM's. Pt verbalized understanding. Pt has also been experiencing some nausea today but refuses and nausea medication because he states he throws up. Pt encouraged to try nausea med but refuses. Pt requested IV in Left AC be removed.  Pt has port for IV infusion, so IV was removed at pts request.

## 2017-06-06 NOTE — PROGRESS NOTES
[facility]        Inpatient Hematology / Oncology Progress Note      Admission Date: 2017 12:35 AM  Reason for Admission/Hospital Course: small bowel obstruction  Small bowel obstruction (HCC)      24 Hour Events:  Was able to tolerate full liquid diet yesterday with Ensure, but had episodes of vomiting and diarrhea last night. Per nursing, he has been refusing antiemetics stating that \"it makes me more sick\". Refused IVF - \"it makes me have an accident in my pants. C Diff negative. Requesting to have \"real food; I haven't eaten any real food since last Thursday. \"    ROS:  Constitutional: +fatigue. Negative for fever, chills, weakness  CV: Negative for chest pain, palpitations, edema. Respiratory: Negative for dyspnea, cough, wheezing. GI: +N/V/D; negative for abdominal pain    10 point review of systems is otherwise negative with the exception of the elements mentioned above in the HPI. Allergies   Allergen Reactions    Oxaliplatin Shortness of Breath, Rash and Other (comments)     Severe abdominal pain       OBJECTIVE:  Patient Vitals for the past 8 hrs:   BP Temp Pulse Resp SpO2   17 0828 139/87 97.8 °F (36.6 °C) 77 20 98 %   17 0359 130/76 98.5 °F (36.9 °C) 85 18 95 %     Temp (24hrs), Av °F (36.7 °C), Min:97.6 °F (36.4 °C), Max:98.5 °F (36.9 °C)         Physical Exam:  Constitutional: Well developed, well nourished male in no acute distress, lying comfortably in the hospital bed. HEENT: Normocephalic and atraumatic. Oropharynx is clear, mucous membranes are moist.  Extraocular muscles are intact. Sclerae anicteric. Neck supple without JVD. No thyromegaly present. Lymph node   Deferred   Skin Warm and dry. No bruising and no rash noted. No erythema. No pallor. Respiratory Lungs are clear to auscultation bilaterally without wheezes, rales or rhonchi, normal air exchange without accessory muscle use.     CVS Normal rate, regular rhythm and normal S1 and S2. No murmurs, gallops, or rubs. Abdomen Soft, nontender and nondistended, normoactive bowel sounds. No palpable mass. No hepatosplenomegaly. Neuro Grossly nonfocal with no obvious sensory or motor deficits. MSK Normal range of motion in general.  No edema and no tenderness. Psych Appropriate mood and affect. Lines without erythema, edema, or tenderness    Labs:    Recent Labs      06/06/17   0300  06/05/17   0340   WBC  2.6*  3.0*   RBC  3.85*  3.86*   HGB  13.0*  12.8*   HCT  37.4*  37.7*   MCV  97.1  97.7   MCH  33.8*  33.2*   MCHC  34.8  34.0   RDW  18.2*  18.7*   PLT  97*  117*   GRANS  59  70   LYMPH  27  16   MONOS  8  5   EOS  6  7   BASOS  0  1   IG  0.4  0.7   DF  AUTOMATED  AUTOMATED   ANEU  1.5*  2.1   ABL  0.7  0.5   ABM  0.2  0.2   DINA  0.2  0.2   ABB  0.0  0.0   AIG  0.0  0.0      Recent Labs      06/06/17   0300  06/05/17   0340   NA  147*  142   K  3.6  4.0   CL  116*  109*   CO2  21  26   AGAP  10  7   GLU  114*  118*   BUN  23  28*   CREA  0.92  0.87   GFRAA  >60  >60   GFRNA  >60  >60   CA  8.7  8.2*   MG  2.0  1.8         Imaging:  CT OF THE ABDOMEN AND PELVIS WITH CONTRAST: 6/5/2017      CLINICAL HISTORY: Intermittent low-grade small bowel obstruction with a  history of metastatic colon cancer.      TECHNIQUE: Oral and nonionic intravenous contrast was administered, and the  abdomen and pelvis were scanned with spiral technique.      COMPARISON: June 1, 2017.       CT ABDOMEN FINDINGS: Small right pleural effusion is again noted. Heterogeneous  hypodense lesion in segment 4 of the liver measures 6.8 x 5.3 cm today, not  significantly changed. A 1.5 cm inferior caudate nodule is likewise unchanged. Cresentic low-attenuation collection along the posterior margin of the liver  remains stable. The spleen, pancreas, adrenals, and kidneys appear  unremarkable.  No significant small bowel dilatation.      CT PELVIS FINDINGS: There is mild gaseous distention of the left colon at the  level of anastomotic staples. No pathologically enlarged lymph nodes or free  fluid is evident. The prostate is not enlarged. Extensive aortoiliac  atherosclerotic calcifications without definite aneurysm. Bone windows  demonstrate no definite aggressive process, accounting for degenerative changes.      IMPRESSION:  1. Mild gaseous distention of the left colon at the level of anastomotic  staples without small bowel distention to suggest significant obstruction. 2. Stable hepatic metastatic disease. ASSESSMENT:    Problem List  Date Reviewed: 5/30/2017          Codes Class Noted    * (Principal)Small bowel obstruction ([facility] [address] ICD-10-CM: K56.69  ICD-9-CM: 560.9  6/5/2017        Dehydration ICD-10-CM: E86.0  ICD-9-CM: 276.51  6/2/2017        Malignant neoplasm of colon ([facility] [address] (Chronic) ICD-10-CM: C18.9  ICD-9-CM: 153.9  10/10/2016        Metastatic colon cancer to [facility]) (Chronic) ICD-10-CM: C18.9, C78.7  ICD-9-CM: 153.9, 197.7  10/10/2016        Hand foot syndrome ICD-10-CM: L27.1  ICD-9-CM: 693.0  7/6/2016        Dysgeusia ICD-10-CM: R43.2  ICD-9-CM: 781.1  6/10/2014        Fatigue ICD-10-CM: R53.83  ICD-9-CM: 780.79  10/29/2013        Urinary urgency ICD-10-CM: R39.15  ICD-9-CM: 187.29  10/26/2012    Overview Signed 10/26/2012  2:16 PM by Callum Ruiz     10-26-12 start flomax                 Mr. Sharon Ventura is a 64yo male patient of Dr. Ashley Zhou with Stage IV colon cancer. He was admitted for N/V/D with possible SBO. PLAN:  Stage IV Colon Cancer  - Currently undergoing FOLFIRI/Avastin, last treatment on 5/30. Next treatment scheduled for 6/13.     N/V/D  - Con't IVF/antiemetics  - Clear liquid diet  - Check stool for C Diff  - CT abdomen neg for ileus or SBO  6/6 Stool negative for C Diff. Was able to tolerate full liquid diet yesterday with Ensure, but had episodes of vomiting and diarrhea last night. Per nursing, he has been refusing antiemetics stating that \"it makes me more sick\".   Refused IVF - \"it makes me have an accident in my pants\". Added ativan for breakthrough nausea/vomiting. Imodium for diarrhea, 4mg now then 2mg after each loose stool, up to 16mg/day. May need to add lomotil if diarrhea persists. Will advance diet to bland GI soft diet, per pt request.     Pancytopenia secondary to chemotherapy  - Counts stable. Con't to monitor        Continue home meds  Светлана SOPs  DVT Prophylaxis: Lovenox    Disposition:  Awaiting vomiting/diarrhea to resolve. Pt will need to follow-up in clinic in 1 week after discharge. Kuldip Mitchell NP   [facility]  [address] [address]  Office : [phone]  Fax : [phone]         Attending Addendum:  I personally evaluated the patient with Kuldip Mitchell N.P.,  and agree with the assessment, findings and plan as documented. Appears stable, heart regular without murmur, lungs clear, abdomen benign. Advance diet. C diff -ve, start anti-diarrheals.                 Claudia Marie MD  [facility]  [address] [address]  Office : [phone]  Fax : [phone]

## 2017-06-06 NOTE — CDMP QUERY
Please clarify if this patient is being treated/managed for:    Severe Protein calorie malnutrition  In the setting of Colon Ca with n/v/d, nutritional intake of less than 50% of required,  recent 13 pound, 7.1% significant weight loss over 1 month, treated with RD consult and  Supplements. =>Other Explanation of clinical findings  =>Unable to Determine (no explanation of clinical findings)    The medical record reflects the following:    Risk Factors: Stage 4 Colon Ca w mets, active n/v/d    Clinical Indicators: insufficient po intake, weight loss of 13 # in one month    Treatment: RD consult and ordered supplements. Please clarify and document your clinical opinion in the progress notes and discharge summary including the definitive and/or presumptive diagnosis, (suspected or probable), related to the above clinical findings. Please include clinical findings supporting your diagnosis.     Thanks,  Luis Dawson RN, CDS  Compliant Documentation Management Program  [phone]

## 2017-06-06 NOTE — PROGRESS NOTES
Problem: Nutrition Deficit  Goal: *Optimize nutritional status  Nutrition  Reason for assessment: Referral received from nursing admission Malnutrition Screening Tool for recently lost 2-13# without trying and eating poorly due to decreased appetite. Assessment:   Diet order(s): CLQ 6/5, FLQ 6/5, GI soft 6/7  Food/Nutrition Patient History:  Patient with h/o colon cancer with mets to liver s/p hepatic metastatectomy and sigmoid colectomy (10/2016). The patient reports that he has not eaten \"solids\" since Thursday when he was admitted for ileus vs SBO. He reports tolerating liquids this morning, however he did vomit last night. Patient reports to RD that he has been having BM \"every hour. \"  The patient denies any changes in appetite/intake over the past couple of months, stating that he is eating but his \"weight just fluctuates. \"  He reports a UBW of 225 pounds years ago and a fairly stable weight of 185 pounds until recently. The patient did consume ensure enlive following procedure in October. He denies any side effects of chemotherapy treatments. Anthropometrics:    Vitals 5/30/2017   Height 5' 8\"   ,  Weight: 77.8 kg (171 lb 8 oz), Weight Source: Standing scale (comment), Body mass index is 26.08 kg/(m^2). BMI class of overweight. WT / BMI 6/5/2017 6/1/2017 5/30/2017 5/15/2017 5/3/2017   WEIGHT 171 lb 8 oz 182 lb 9.6 oz 178 lb 6.4 oz 181 lb 184 lb 3.2 oz       WT / BMI 5/1/2017 4/20/2017 4/5/2017 4/3/2017 4/3/2017   WEIGHT 184 lb 185 lb 3.2 oz 187 lb 3.2 oz 184 lb 9.6 oz 184 lb 9.6 oz       WT / BMI 3/22/2017 3/20/2017 3/14/2017 3/6/2017 2/7/2017   WEIGHT 189 lb 12.8 oz 182 lb 12.8 oz 186 lb 8 oz 183 lb 184 lb       WT / BMI 1/16/2017 1/13/2017 1/11/2017 1/10/2017 1/9/2017   WEIGHT 174 lb 175 lb 11.2 oz 178 lb 6.4 oz 174 lb 12.8 oz 165 lb       WT / BMI 12/6/2016   WEIGHT 165 lb 14.4 oz   The patient has had a potential ~6 pound weight gain over the past 6 months.   However, he has had a recent 15 pound, 7.1% significant weight loss over 1 month. Meets Criteria for Malnutrition in the context of Acute Illness/Injury   [X] Severe Malnutrition, as evidenced by:              [ ] Moderate to severe loss of muscle mass              [X] Nutritional intake of <50% of energy intake compared to estimated energy needs for > 5 days              [X] Weight loss of >2% in 1 week, >5% in 1 month, >7.5% in 3 months              [ ] Moderate to severe edema              [ ] Moderate to severe loss of subcutaneous fat                     Macronutrient needs:  EER:  9118-3260 kcal /day (25-28 kcal/kg listed BW)  EPR:   grams protein/day (1.2-1.5 grams/kg IBW)(GFR >60)  Intake/Comparative Standards: Patient has not received a GI soft diet during this admission. Nutrition Diagnosis: Inadequate oral intake r/t decreased ability to consume sufficient oral intake as evidenced by patient report of no intake of solids in ~5 days, n/v diarrhea secondary to ileus vs SBO, significant weight loss in 1 month, meeting criteria for acute malnutrition. Intervention:  Meals and snacks: Continue current diet. Yogurt with each meal.   Nutrition Supplement Therapy: ensure enlive TID  Consider metamucil or Florastor. Provided patient with a menu, assisted with lunch order. RD encouraged incorporating ensure enlive into home diet to prevent further weight loss.       Yazmin Arcos [address], [address] [place] [address], [address] [place] [address], [phone]

## 2017-06-06 NOTE — PROGRESS NOTES
END OF SHIFT NOTE:    Intake/Output  06/05 1901 - 06/06 0700  In: 1440 [P.O.:237; I.V.:1203]  Out: 780    Voiding: YES  Catheter: NO  Drain:              Stool:  3 occurrences. Stool Assessment  Stool Color: Tan (Comment) (06/05/17 2144)  Stool Appearance: Watery (06/05/17 2144)  Stool Amount: Medium (06/05/17 2144)  Stool Source/Status: Rectum (06/05/17 2144)    Emesis:  1 occurrences. VITAL SIGNS  Patient Vitals for the past 12 hrs:   Temp Pulse Resp BP SpO2   06/06/17 0359 98.5 °F (36.9 °C) 85 18 130/76 95 %   06/05/17 2235 97.6 °F (36.4 °C) 93 18 155/88 97 %   06/05/17 1915 98.4 °F (36.9 °C) 73 18 150/87 98 %       Pain Assessment  Pain 1  Pain Scale 1: Visual (06/06/17 0212)  Pain Intensity 1: 0 (06/06/17 0212)  Patient Stated Pain Goal: 0 (06/06/17 0212)  Pain Reassessment 1: Patient sleeping (06/06/17 6921)    Ambulating  Yes    Additional Information:   Pt vomited at 2242, refuses nausea medication, states medication makes him more sick. Pt had multiple, loose BMs through the night. Pt refused IVF, states that they make him have an accident in his pants. Pt denies any pain, in bed at this time. Shift report given to oncoming nurse at the bedside.     Mark Isbell RN
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
## 2017-06-07 NOTE — PROGRESS NOTES
Pt's wife requested to speak to NP. Attempted x 2.  1st time pt was in [place] with wife assisting and asked me to come back. On 2nd attempt, wife had already left to go home.

## 2017-06-07 NOTE — PROGRESS NOTES
Problem: Mobility Impaired (Adult and Pediatric)  Goal: *Acute Goals and Plan of Care (Insert Text)  1. Ms. Addison Hunter will perform gait independently 1000 ft in 5 days. 2. Mr. Addison Hunter will perform up and down 5 steps with rail independently in 5 days. PHYSICAL THERAPY: INITIAL ASSESSMENT 6/7/2017  INPATIENT: Hospital Day: 3  [place] / [place] / Product Type: Medicare /      NAME/AGE/GENDER: Nacho Guerrero is a 75 y.o. male      PRIMARY DIAGNOSIS: small bowel obstruction  Small bowel obstruction (HCC) Small bowel obstruction (HCC) Small bowel obstruction (HCC)        ICD-10: Treatment Diagnosis:       · Generalized Muscle Weakness (M62.81)  · Difficulty in walking, Not elsewhere classified (R26.2)   Precaution/Allergies:  Oxaliplatin       ASSESSMENT:      Mr. Addison Hunter presents with decreased mobility and gait due to days of diarrhea, and no sleep. He looks worn out. He was able to make it around the floor loop. Provided contact guard for some gait deviation. He was able to stand and put his pajama bottoms on so he has some good balance in general.  Anticipate that as the diarrhea subsides and he is able to eat and sleep all will turn around. Mr. Addison Hunter is appropriate for skilled PT to maximize his rehab potential.  Will likely not need skilled PT at time of discharge. This section established at most recent assessment   PROBLEM LIST (Impairments causing functional limitations):  1. Decreased Ambulation Ability/Technique  2. Decreased Activity Tolerance    INTERVENTIONS PLANNED: (Benefits and precautions of physical therapy have been discussed with the patient.)  1. Gait Training  2. Therapeutic Activites      TREATMENT PLAN: Frequency/Duration: 3 times a week for duration of hospital stay  Rehabilitation Potential For Stated Goals: GOOD      RECOMMENDED REHABILITATION/EQUIPMENT: (at time of discharge pending progress): Continue Skilled Therapy. HISTORY:   History of Present Injury/Illness (Reason for Referral):  Desiree Palacios is a 73YJY with stage 4 colon ca (most recent chemo from 5/30-6/1) who presented to [facility] ED with n/v/d that started on the night of 6/1. He reports significant amount of bright yellow emesis, which has improved since getting zofran at [facility]. He also has had 4-5 episodes of loose stools daily since then as well. He reports mild lower abd pain, but not significant. Does have hx of ex lap with cholecystectomy and sigmoid colon resection in 10/2016. Xray at [facility] was significant for air fluid levels and ileus vs early SBO. The ED physician at [facility] spoke with Dr. Tito Manley, who asked that Hospitalist admit tonight for Oncology to take over in the AM. Because his n/v improved with IVF, an NGT was not placed while at [facility]  Past Medical History/Comorbidities:   Mr. Americo Da Silva  has a past medical history of Cancer ([facility] [address] (2012); Colon cancer ([facility] [address] GERD (gastroesophageal reflux disease); History of anemia; Hypertension; Other ill-defined conditions; Psychiatric disorder; PTSD (post-traumatic stress disorder); PUD (peptic ulcer disease); Ulcer ([address] (2008); and Unspecified sleep apnea. He also has no past medical history of Difficult intubation; Malignant hyperthermia due to anesthesia; Nausea & vomiting; or Pseudocholinesterase deficiency. Mr. Americo Da Silva  has a past surgical history that includes egd deliver thermal energy sphnctr/cardia gerd (2008); colonoscopy (N/A, 9/13/2016); vascular access; colonoscopy; heent (1980s); and tonsillectomy.   Social History/Living Environment:   Home Environment: Private residence  # Steps to Enter: 5  One/Two Story Residence: Two story, live on 1st floor  # of Interior Steps: 12  Interior Rails: Left  Lift Chair Available: No  Living Alone: No  Support Systems: Spouse/Significant Other/Partner  Patient Expects to be Discharged to[de-identified] Private residence  Current DME Used/Available at Home: None  Tub or Shower Type: Shower  Prior Level of Function/Work/Activity:  Independent.  age, colon ca   Number of Personal Factors/Comorbidities that affect the Plan of Care: 1-2: MODERATE COMPLEXITY   EXAMINATION:   Most Recent Physical Functioning:   Gross Assessment:  AROM: Generally decreased, functional  Strength: Generally decreased, functional               Posture:  Posture (WDL): Within defined limits  Balance:  Sitting: Intact  Standing: Impaired  Standing - Static: Good  Standing - Dynamic : Fair Bed Mobility:  Rolling: Independent  Supine to Sit: Independent  Sit to Supine: Independent  Wheelchair Mobility:     Transfers:  Sit to Stand: Supervision  Stand to Sit: Supervision  Gait:     Distance (ft): 300 Feet (ft)  Ambulation - Level of Assistance: Supervision;Contact guard assistance  Interventions: Verbal cues; Tactile cues; Safety awareness training       Body Structures Involved:  1. Muscles Body Functions Affected:  1. Movement Related Activities and Participation Affected:  1. Mobility  2. Domestic Life   Number of elements that affect the Plan of Care: 4+: HIGH COMPLEXITY   CLINICAL PRESENTATION:   Presentation: Evolving clinical presentation with changing clinical characteristics: MODERATE COMPLEXITY   CLINICAL DECISION MAKIN [facility] Inpatient Short Form  How much difficulty does the patient currently have. .. Unable A Lot A Little None   1. Turning over in bed (including adjusting bedclothes, sheets and blankets)? [ ] 1   [ ] 2   [ ] 3   [X] 4   2. Sitting down on and standing up from a chair with arms ( e.g., wheelchair, bedside commode, etc.)   [ ] 1   [ ] 2   [ ] 3   [X] 4   3. Moving from lying on back to sitting on the side of the bed? [ ] 1   [ ] 2   [ ] 3   [X] 4   How much help from another person does the patient currently need. .. Total A Lot A Little None   4. Moving to and from a bed to a chair (including a wheelchair)? [ ] 1   [ ] 2   [ ] 3   [X] 4   5. Need to walk in hospital room? [ ] 1   [ ] 2   [X] 3   [ ] 4   6. Climbing 3-5 steps with a railing? [ ] 1   [ ] 2   [X] 3   [ ] 4   © 2007, Trustees of [address] [place] [address] [address] [address], under license to [employer]. All rights reserved    Score:  Initial: 22 Most Recent: X (Date: -- )     Interpretation of Tool:  Represents activities that are increasingly more difficult (i.e. Bed mobility, Transfers, Gait). Score 24 23 22-20 19-15 14-10 9-7 6       Modifier CH CI CJ CK CL CM CN         · Mobility - Walking and Moving Around:               - CURRENT STATUS:    CJ - 20%-39% impaired, limited or restricted               - GOAL STATUS:           CJ - 20%-39% impaired, limited or restricted               - D/C STATUS:                       ---------------To be determined---------------  [place] / Product Type: Medicare /       Medical Necessity:     · Patient is expected to demonstrate progress in functional technique to increase independence with mobility and gait. .  Reason for Services/Other Comments:  · Patient continues to require present interventions due to patient's inability to function at baseline. .   Use of outcome tool(s) and clinical judgement create a POC that gives a: Questionable prediction of patient's progress: MODERATE COMPLEXITY                 TREATMENT:   (In addition to Assessment/Re-Assessment sessions the following treatments were rendered)   Pre-treatment Symptoms/Complaints:  none  Pain: Initial:   Pain Intensity 1: 0  Post Session:  Seemed to being doing better. Agreed to be up in the chair.       Assessment/Reassessment only, no treatment provided today     Braces/Orthotics/Lines/Etc:   ·    Treatment/Session Assessment:    · Response to Treatment:  good  · Interdisciplinary Collaboration:  · Registered Nurse  · After treatment position/precautions:  · Up in chair  · Call light within reach  · RN notified  · Compliance with Program/Exercises: Will assess as treatment progresses. · Recommendations/Intent for next treatment session: \"Next visit will focus on advancements to more challenging activities and reduction in assistance provided\".   Total Treatment Duration:  PT Patient Time In/Time Out  Time In: 1050  Time Out: [address], PT

## 2017-06-07 NOTE — PROGRESS NOTES
END OF SHIFT NOTE:    Intake/Output  06/07 0701 - 06/07 1900  In: 4199 [P.O.:100; I.V.:948]  Out: 100    Voiding: YES  Catheter: NO  Drain:      Stool:  4 occurrences. Stool Assessment  Stool Color: Landon Alexandre (06/06/17 2000)  Stool Appearance: Watery (06/07/17 0718)  Stool Amount: Medium (06/06/17 2000)  Stool Source/Status: Rectum (06/06/17 2000)    Emesis:  1 occurrences. Emesis Assessment  Appearance: Landon Alexandre (06/06/17 2203)  Emesis Amount: Large (06/06/17 2203)    VITAL SIGNS  Patient Vitals for the past 12 hrs:   Temp Pulse Resp BP SpO2   06/07/17 1448 98.2 °F (36.8 °C) 71 19 142/75 98 %   06/07/17 1115 98.2 °F (36.8 °C) 93 20 (!) 137/91 93 %   06/07/17 0704 97.5 °F (36.4 °C) 71 18 (!) 150/93 97 %       Pain Assessment  Pain 1  Pain Scale 1: Visual (06/07/17 1115)  Pain Intensity 1: 0 (06/07/17 1115)  Patient Stated Pain Goal: 0 (06/07/17 0303)  Pain Reassessment 1: Patient sleeping (06/07/17 0303)    Ambulating  Yes    Additional Information: Pt ambulated in holt today with PT. Pt did eat some of his dinner and has been able to keep down until this point. Pt has not had diarrhea since prior to lunch. Pt did have one episode of emesis and Zofran was given. Shift report given to oncoming nurse at the bedside.     Ember Graves RN

## 2017-06-07 NOTE — PROGRESS NOTES
[facility]        Inpatient Hematology / Oncology Progress Note      Admission Date: 2017 12:35 AM  Reason for Admission/Hospital Course: small bowel obstruction  Small bowel obstruction (HCC)      24 Hour Events:  Diarrhea persisting - traces of bright red blood in stool began last night  C Diff negative. Per nursing, he has been refusing antiemetics stating that \"it makes me more sick\". Refusing IVF at night - \"it makes me have an accident in my pants. Requested advancement in diet yesterday, but states he does not have any appetite to actually eat it      ROS:  Constitutional: +fatigue. Negative for fever, chills, weakness  CV: Negative for chest pain, palpitations, edema. Respiratory: Negative for dyspnea, cough, wheezing. GI: +N/V/D; negative for abdominal pain    10 point review of systems is otherwise negative with the exception of the elements mentioned above in the HPI. Allergies   Allergen Reactions    Oxaliplatin Shortness of Breath, Rash and Other (comments)     Severe abdominal pain       OBJECTIVE:  Patient Vitals for the past 8 hrs:   BP Temp Pulse Resp SpO2   17 1115 (!) 137/91 98.2 °F (36.8 °C) 93 20 93 %   17 0704 (!) 150/93 97.5 °F (36.4 °C) 71 18 97 %     Temp (24hrs), Av.9 °F (36.6 °C), Min:97.5 °F (36.4 °C), Max:98.2 °F (36.8 °C)     0701 -  1900  In: 200 [P.O.:100; I.V.:100]  Out: 100     Physical Exam:  Constitutional: Well developed, well nourished male in no acute distress, lying comfortably in the hospital bed. HEENT: Normocephalic and atraumatic. Oropharynx is clear, mucous membranes are moist.  Extraocular muscles are intact. Sclerae anicteric. Neck supple without JVD. No thyromegaly present. Lymph node   Deferred   Skin Warm and dry. No bruising and no rash noted. No erythema. No pallor.     Respiratory Lungs are clear to auscultation bilaterally without wheezes, rales or rhonchi, normal air exchange without accessory muscle use. CVS Normal rate, regular rhythm and normal S1 and S2. No murmurs, gallops, or rubs. Abdomen Soft, nontender and nondistended, normoactive bowel sounds. No palpable mass. No hepatosplenomegaly. Neuro Grossly nonfocal with no obvious sensory or motor deficits. MSK Normal range of motion in general.  No edema and no tenderness. Psych Appropriate mood and affect. Line without erythema, edema, or tenderness    Labs:      Recent Labs      06/07/17   0247  06/07/17   0010  06/06/17   0300  06/05/17   0340   WBC  3.4*   --   2.6*  3.0*   RBC  3.76*   --   3.85*  3.86*   HGB  12.7*  12.6*  13.0*  12.8*   HCT  36.7*  36.8*  37.4*  37.7*   MCV  97.6   --   97.1  97.7   MCH  33.8*   --   33.8*  33.2*   MCHC  34.6   --   34.8  34.0   RDW  18.3*   --   18.2*  18.7*   PLT  95*   --   97*  117*   GRANS  60   --   59  70   LYMPH  25   --   27  16   MONOS  10   --   8  5   EOS  5   --   6  7   BASOS  0   --   0  1   IG  0.0   --   0.4  0.7   DF  AUTOMATED   --   AUTOMATED  AUTOMATED   ANEU  2.0   --   1.5*  2.1   ABL  0.9   --   0.7  0.5   ABM  0.4   --   0.2  0.2   DINA  0.2   --   0.2  0.2   ABB  0.0   --   0.0  0.0   AIG  0.0   --   0.0  0.0        Recent Labs      06/07/17 0247 06/06/17   0300  06/05/17   0340   NA  148*  147*  142   K  3.4*  3.6  4.0   CL  119*  116*  109*   CO2  18*  21  26   AGAP  11  10  7   GLU  123*  114*  118*   BUN  20  23  28*   CREA  0.84  0.92  0.87   GFRAA  >60  >60  >60   GFRNA  >60  >60  >60   CA  8.8  8.7  8.2*   MG  1.9  2.0  1.8         Imaging:  CT OF THE ABDOMEN AND PELVIS WITH CONTRAST: 6/5/2017      CLINICAL HISTORY: Intermittent low-grade small bowel obstruction with a  history of metastatic colon cancer.      TECHNIQUE: Oral and nonionic intravenous contrast was administered, and the  abdomen and pelvis were scanned with spiral technique.      COMPARISON: June 1, 2017.       CT ABDOMEN FINDINGS: Small right pleural effusion is again noted. Heterogeneous  hypodense lesion in segment 4 of the liver measures 6.8 x 5.3 cm today, not  significantly changed. A 1.5 cm inferior caudate nodule is likewise unchanged. Cresentic low-attenuation collection along the posterior margin of the liver  remains stable. The spleen, pancreas, adrenals, and kidneys appear  unremarkable. No significant small bowel dilatation.      CT PELVIS FINDINGS: There is mild gaseous distention of the left colon at the  level of anastomotic staples. No pathologically enlarged lymph nodes or free  fluid is evident. The prostate is not enlarged. Extensive aortoiliac  atherosclerotic calcifications without definite aneurysm. Bone windows  demonstrate no definite aggressive process, accounting for degenerative changes.      IMPRESSION:  1. Mild gaseous distention of the left colon at the level of anastomotic  staples without small bowel distention to suggest significant obstruction. 2. Stable hepatic metastatic disease. ASSESSMENT:    Problem List  Date Reviewed: 5/30/2017          Codes Class Noted    * (Principal)Small bowel obstruction ([facility] [address] ICD-10-CM: K56.69  ICD-9-CM: 560.9  6/5/2017        Dehydration ICD-10-CM: E86.0  ICD-9-CM: 276.51  6/2/2017        Malignant neoplasm of colon ([facility] [address] (Chronic) ICD-10-CM: C18.9  ICD-9-CM: 153.9  10/10/2016        Metastatic colon cancer to liver [facility]) (Chronic) ICD-10-CM: C18.9, C78.7  ICD-9-CM: 153.9, 197.7  10/10/2016        Hand foot syndrome ICD-10-CM: L27.1  ICD-9-CM: 693.0  7/6/2016        Dysgeusia ICD-10-CM: R43.2  ICD-9-CM: 781.1  6/10/2014        Fatigue ICD-10-CM: R53.83  ICD-9-CM: 780.79  10/29/2013        Urinary urgency ICD-10-CM: R39.15  ICD-9-CM: 747.43  10/26/2012    Overview Signed 10/26/2012  2:16 PM by Shannan Farmer     10-26-12 start flomax                 Mr. Mercedes Quinn is a 66yo male patient of Dr. Sukhdev Flynn with Stage IV colon cancer. He was admitted for N/V/D with possible SBO.     PLAN:  Stage IV Colon Cancer  - Currently undergoing FOLFIRI/Avastin, last treatment on 5/30. Next treatment scheduled for 6/13.     N/V/D  - Con't IVF/antiemetics  - Clear liquid diet  - Check stool for C Diff  - CT abdomen neg for ileus or SBO  6/6 Stool negative for C Diff. Was able to tolerate full liquid diet yesterday with Ensure, but had episodes of vomiting and diarrhea last night. Per nursing, he has been refusing antiemetics stating that \"it makes me more sick\". Refused IVF - \"it makes me have an accident in my pants\". Added ativan for breakthrough nausea/vomiting. Imodium for diarrhea, 4mg now then 2mg after each loose stool, up to 16mg/day. May need to add lomotil if diarrhea persists. Will advance diet to bland GI soft diet, per pt request.  6/7  Diarrhea persisting despite immodium. Lomotil added prn. Began having traces of bright red blood in stool last night. ?Irritation from frequent BMs. No obvious change in Hgb, remaining stable. Will consult GI.     Pancytopenia secondary to chemotherapy  - Counts stable. Con't to monitor    Electrolyte Imbalance  - Replace prn per [place] SOPs  6/7 Na+ 148. Change IVF to 1/2NS. K+ 3.4 - replace per SOPs    Malnutrition  - RD following  6/7 Pt requested advancement in diet yesterday, but states he has no appetite to actually eat it, but is able to drink at least 3 Ensures daily. Will start on Marinol 2.5mg twice daily before meals. Weakness  - PT consulted     Continue home meds  [place] SOPs  DVT Prophylaxis: Lovenox              [facility]  [address] [place] [address]  Office : [phone]  Fax : [phone]     Attending Addendum:  I personally evaluated the patient with Medina Villatoro N.P.,  and agree with the assessment, findings and plan as documented. Appears stable, heart regular without murmur, lungs clear, abdomen benign. Add marinol as appetite stimulant. Optimize anti-diarrheals.                MD Kathy Barr [facility]  [facility]  [address] [address]  Office : [phone]  Fax : [phone]

## 2017-06-08 NOTE — PROGRESS NOTES
[facility]        Inpatient Hematology / Oncology Progress Note      Admission Date: 2017 12:35 AM  Reason for Admission/Hospital Course: Diarrhea, unspecified type [R19.7]  Nausea [R11.0]      24 Hour Events:  Diarrhea improving. C Diff negative. No BM since other stool studies ordered. Started [place] yesterday - was able to eat dinner last night as well as breakfast this AM  Reports one episode of emesis last night, relieved by zofran      ROS:  Constitutional: +fatigue +weakness. Negative for fever, chills. CV: Negative for chest pain, palpitations, edema. Respiratory: Negative for dyspnea, cough, wheezing. GI: +N/V/D; negative for abdominal pain    10 point review of systems is otherwise negative with the exception of the elements mentioned above in the HPI. Allergies   Allergen Reactions    Oxaliplatin Shortness of Breath, Rash and Other (comments)     Severe abdominal pain       OBJECTIVE:  Patient Vitals for the past 8 hrs:   BP Temp Pulse Resp SpO2   17 1145 110/64 98 °F (36.7 °C) 76 18 97 %   17 0711 143/79 97.4 °F (36.3 °C) 82 18 95 %     Temp (24hrs), Av.9 °F (36.6 °C), Min:97.4 °F (36.3 °C), Max:98.4 °F (36.9 °C)     0701 -  1900  In: 5281 [P.O.:240; I.V.:935]  Out: -     Physical Exam:  Constitutional: Ill-appearing male in no acute distress, lying comfortably in the hospital bed. HEENT: Normocephalic and atraumatic. Oropharynx is clear, mucous membranes are moist.  Extraocular muscles are intact. Sclerae anicteric. Neck supple without JVD. No thyromegaly present. Lymph node   Deferred   Skin Warm and dry. No bruising and no rash noted. No erythema. No pallor. Respiratory Lungs are clear to auscultation bilaterally without wheezes, rales or rhonchi, normal air exchange without accessory muscle use. CVS Normal rate, regular rhythm and normal S1 and S2. No murmurs, gallops, or rubs.    Abdomen Soft, nontender and nondistended, normoactive bowel sounds. No palpable mass. No hepatosplenomegaly. Neuro Grossly nonfocal with no obvious sensory or motor deficits. MSK Normal range of motion in general.  No edema and no tenderness. Psych Appropriate mood and affect. Line without erythema, edema, or tenderness    Labs:      Recent Labs      06/08/17   0350  06/07/17   0247  06/07/17   0010  06/06/17   0300   WBC  2.9*  3.4*   --   2.6*   RBC  3.35*  3.76*   --   3.85*   HGB  11.3*  12.7*  12.6*  13.0*   HCT  32.0*  36.7*  36.8*  37.4*   MCV  95.5  97.6   --   97.1   MCH  33.7*  33.8*   --   33.8*   MCHC  35.3*  34.6   --   34.8   RDW  18.0*  18.3*   --   18.2*   PLT  84*  95*   --   97*   GRANS  48  60   --   59   LYMPH  32  25   --   27   MONOS  15*  10   --   8   EOS  4  5   --   6   BASOS  1  0   --   0   IG  0.3  0.0   --   0.4   DF  AUTOMATED  AUTOMATED   --   AUTOMATED   ANEU  1.4*  2.0   --   1.5*   ABL  0.9  0.9   --   0.7   ABM  0.4  0.4   --   0.2   DINA  0.1  0.2   --   0.2   ABB  0.0  0.0   --   0.0   AIG  0.0  0.0   --   0.0        Recent Labs      06/08/17   0350  06/07/17   0247  06/06/17   0300   NA  142  148*  147*   K  3.5  3.4*  3.6   CL  111*  119*  116*   CO2  22  18*  21   AGAP  9  11  10   GLU  89  123*  114*   BUN  15  20  23   CREA  0.84  0.84  0.92   GFRAA  >60  >60  >60   GFRNA  >60  >60  >60   CA  8.7  8.8  8.7   MG   --   1.9  2.0         Imaging:  CT OF THE ABDOMEN AND PELVIS WITH CONTRAST: 6/5/2017      CLINICAL HISTORY: Intermittent low-grade small bowel obstruction with a  history of metastatic colon cancer.      TECHNIQUE: Oral and nonionic intravenous contrast was administered, and the  abdomen and pelvis were scanned with spiral technique.      COMPARISON: June 1, 2017.       CT ABDOMEN FINDINGS: Small right pleural effusion is again noted. Heterogeneous  hypodense lesion in segment 4 of the liver measures 6.8 x 5.3 cm today, not  significantly changed.  A 1.5 cm inferior caudate nodule is likewise unchanged. Cresentic low-attenuation collection along the posterior margin of the liver  remains stable. The spleen, pancreas, adrenals, and kidneys appear  unremarkable. No significant small bowel dilatation.      CT PELVIS FINDINGS: There is mild gaseous distention of the left colon at the  level of anastomotic staples. No pathologically enlarged lymph nodes or free  fluid is evident. The prostate is not enlarged. Extensive aortoiliac  atherosclerotic calcifications without definite aneurysm. Bone windows  demonstrate no definite aggressive process, accounting for degenerative changes.      IMPRESSION:  1. Mild gaseous distention of the left colon at the level of anastomotic  staples without small bowel distention to suggest significant obstruction. 2. Stable hepatic metastatic disease. ASSESSMENT:    Problem List  Date Reviewed: 5/30/2017          Codes Class Noted    * (Principal)Small bowel obstruction ([facility] [address] ICD-10-CM: K56.69  ICD-9-CM: 560.9  6/5/2017        Dehydration ICD-10-CM: E86.0  ICD-9-CM: 276.51  6/2/2017        Malignant neoplasm of colon ([facility] [address] (Chronic) ICD-10-CM: C18.9  ICD-9-CM: 153.9  10/10/2016        Metastatic colon cancer to liver [facility]) (Chronic) ICD-10-CM: C18.9, C78.7  ICD-9-CM: 153.9, 197.7  10/10/2016        Hand foot syndrome ICD-10-CM: L27.1  ICD-9-CM: 693.0  7/6/2016        Dysgeusia ICD-10-CM: R43.2  ICD-9-CM: 781.1  6/10/2014        Fatigue ICD-10-CM: R53.83  ICD-9-CM: 780.79  10/29/2013        Urinary urgency ICD-10-CM: R39.15  ICD-9-CM: 029.55  10/26/2012    Overview Signed 10/26/2012  2:16 PM by Alicia Motta     10-26-12 start flomax                 Mr. hSawna Zheng is a 64yo male patient of Dr. Esther Valenzuela with Stage IV colon cancer. He was admitted for N/V/D with possible SBO. PLAN:  Stage IV Colon Cancer  - Currently undergoing FOLFIRI/Avastin, last treatment on 5/30.  Next treatment scheduled for 6/13.     N/V/D  - Con't IVF/antiemetics  - Clear liquid diet  - Check stool for C Diff  - CT abdomen neg for ileus or SBO  6/6 Stool negative for C Diff. Was able to tolerate full liquid diet yesterday with Ensure, but had episodes of vomiting and diarrhea last night. Per nursing, he has been refusing antiemetics stating that \"it makes me more sick\". Refused IVF - \"it makes me have an accident in my pants\". Added ativan for breakthrough nausea/vomiting. Imodium for diarrhea, 4mg now then 2mg after each loose stool, up to 16mg/day. May need to add lomotil if diarrhea persists. Will advance diet to bland GI soft diet, per pt request.  6/7  Diarrhea persisting despite immodium. Lomotil added prn. Began having traces of bright red blood in stool last night. ?Irritation from frequent BMs. No obvious change in Hgb, remaining stable. Will consult GI.  6/8 GI expanded stool studies to include O&P, Giardia, Lactoferrin, Stool cx. Pt has not had BM since orders were placed yesterday. GI started on Cipro empirically. Reports one episode of emesis last night, relieved by zofran. Intermittent nausea persisting, but pt states antiemetics as well as marinol are helping.     Pancytopenia secondary to chemotherapy  - Counts stable. Con't to monitor  6/8 Noted decrease in Hgb, 11.3 from 12.7 yesterday. Will con't to monitor. Electrolyte Imbalance  - Replace prn per [facility] SOPs  6/7 Na+ 148. Change IVF to 1/2NS. K+ 3.4 - replace per SOPs  6/8 Improved - Na+ 142, K+ 3.5    Malnutrition  - RD following  6/7 Pt requested advancement in diet yesterday, but states he has no appetite to actually eat it, but is able to drink at least 3 Ensures daily. Will start on Marinol 2.5mg twice daily before meals. 6/8  Pt reports more appetite with Marinol. Was able to eat dinner last night as well as breakfast this AM.  Will give dex 10mg x 1 today.     Weakness  - PT consulted  6/8 Con't to work with PT     Continue home meds  [facility] SOPs  DVT Prophylaxis: Lovenox              Gerardo PAYTON Reynolds   [employer] [facility]ematology [facility] Oncology  [address] [address]  Office : [phone]  Fax : [phone]         Attending Addendum:  I personally evaluated the patient with Daniella Patel N.P.,  and agree with the assessment, findings and plan as documented. Appears stable, heart regular without murmur, lungs clear, abdomen benign. Clinically doing better. Dex 10 mg x 1 for nausea today.                Suze Garcia MD  [facility]  [address] [address]  Office : [phone]  Fax : [phone]

## 2017-06-08 NOTE — PROGRESS NOTES
Patient rested quietly the better part of the night, after having several very small loose BMs. Consistency was more mucous than watery, small specks of blood noted. Lomotil given PRN for frequent diarrhea. Stools sample ordered, patient aware of need, patient has not had another BM. No C/O N/V. Voiding without difficulty.

## 2017-06-08 NOTE — ANESTHESIA PREPROCEDURE EVALUATION
Anesthetic History               Review of Systems / Medical History  Patient summary reviewed and pertinent labs reviewed    Pulmonary        Sleep apnea: No treatment           Neuro/Psych         Psychiatric history     Cardiovascular    Hypertension                   GI/Hepatic/Renal     GERD      PUD     Endo/Other        Cancer (Colon) and anemia     Other Findings   Comments: Persistent Diarrhea                Anesthetic Plan    ASA: 3  Anesthesia type: total IV anesthesia                Chart review only

## 2017-06-08 NOTE — PROGRESS NOTES
Bedside shift change report given to  (oncoming nurse) by Nicholas Allen RN (offgoing nurse). Report included the following information SBAR, Kardex and MAR. No BM today. Pt with less N/V. Up to BR ad rey. Flex sigmoidoscopy planned for tomorrow.

## 2017-06-08 NOTE — PROGRESS NOTES
Problem: Mobility Impaired (Adult and Pediatric)  Goal: *Acute Goals and Plan of Care (Insert Text)  1. Ms. Jade Hidalgo will perform gait independently 1000 ft in 5 days. 2. Mr. aJde Hidalgo will perform up and down 5 steps with rail independently in 5 days. PHYSICAL THERAPY: Daily Note, Treatment Day: 1st and AM 6/8/2017  INPATIENT: Hospital Day: 4  [place] / [place] / Product Type: Medicare /      NAME/AGE/GENDER: Precious Brownlee is a 75 y.o. male      PRIMARY DIAGNOSIS: Diarrhea, unspecified type [R19.7]  Nausea [R11.0] Small bowel obstruction (HCC) Small bowel obstruction (HCC)  Procedure(s) (LRB):  ESOPHAGOGASTRODUODENOSCOPY (EGD) (N/A)  SIGMOIDOSCOPY FLEXIBLE  BMI 31  ROOM 504 (N/A)     ICD-10: Treatment Diagnosis:       · Generalized Muscle Weakness (M62.81)  · Difficulty in walking, Not elsewhere classified (R26.2)   Precaution/Allergies:  Oxaliplatin       ASSESSMENT:      Mr. Jade Hidalgo is better today. Stronger, got some sleep. He has not had diarrhea since yesterday. He was smiling today. Doubled his distance and then ready to eat. Will check on him tomorrow if all good will discharge. This section established at most recent assessment   PROBLEM LIST (Impairments causing functional limitations):  1. Decreased Ambulation Ability/Technique  2. Decreased Activity Tolerance    INTERVENTIONS PLANNED: (Benefits and precautions of physical therapy have been discussed with the patient.)  1. Gait Training  2. Therapeutic Activites      TREATMENT PLAN: Frequency/Duration: 3 times a week for duration of hospital stay  Rehabilitation Potential For Stated Goals: GOOD      RECOMMENDED REHABILITATION/EQUIPMENT: (at time of discharge pending progress): Continue Skilled Therapy.                    HISTORY:   History of Present Injury/Illness (Reason for Referral):  Precious Brownlee is a 49FGX with stage 4 colon ca (most recent chemo from 5/30-6/1) who presented to [facility] ED with n/v/d that started on the night of 6/1. He reports significant amount of bright yellow emesis, which has improved since getting zofran at [facility]. He also has had 4-5 episodes of loose stools daily since then as well. He reports mild lower abd pain, but not significant. Does have hx of ex lap with cholecystectomy and sigmoid colon resection in 10/2016. Xray at [facility] was significant for air fluid levels and ileus vs early SBO. The ED physician at [facility] spoke with Dr. Darrell Johnson, who asked that Hospitalist admit tonight for Oncology to take over in the AM. Because his n/v improved with IVF, an NGT was not placed while at [facility]  Past Medical History/Comorbidities:   Mr. Alexandre Blount  has a past medical history of Cancer ([facility] [address] (2012); Colon cancer ([facility] [address] GERD (gastroesophageal reflux disease); History of anemia; Hypertension; Other ill-defined conditions; Psychiatric disorder; PTSD (post-traumatic stress disorder); PUD (peptic ulcer disease); Ulcer ([facility] [address] (2008); and Unspecified sleep apnea. He also has no past medical history of Difficult intubation; Malignant hyperthermia due to anesthesia; Nausea & vomiting; or Pseudocholinesterase deficiency. Mr. Alexandre Blount  has a past surgical history that includes egd deliver thermal energy sphnctr/cardia gerd (2008); colonoscopy (N/A, 9/13/2016); vascular access; colonoscopy; heent (1980s); and tonsillectomy.   Social History/Living Environment:   Home Environment: Private residence  # Steps to Enter: 5  One/Two Story Residence: Two story, live on 1st floor  # of Interior Steps: 12  Interior Rails: Left  Lift Chair Available: No  Living Alone: No  Support Systems: Spouse/Significant Other/Partner  Patient Expects to be Discharged to[de-identified] Private residence  Current DME Used/Available at Home: None  Tub or Shower Type: Shower  Prior Level of Function/Work/Activity:  Independent.  age, colon ca   Number of Personal Factors/Comorbidities that affect the Plan of Care: 1-2: MODERATE COMPLEXITY   EXAMINATION:   Most Recent Physical Functioning:   Gross Assessment:  AROM: Generally decreased, functional  Strength: Generally decreased, functional               Posture:  Posture (WDL): Within defined limits  Balance:  Sitting: Intact  Standing - Static: Good  Standing - Dynamic : Good Bed Mobility:  Rolling: Independent  Supine to Sit: Independent  Sit to Supine: Independent  Wheelchair Mobility:     Transfers:  Sit to Stand: Independent  Stand to Sit: Independent  Gait:     Distance (ft): 600 Feet (ft)  Ambulation - Level of Assistance: Stand-by asssistance       Body Structures Involved:  1. Muscles Body Functions Affected:  1. Movement Related Activities and Participation Affected:  1. Mobility  2. Domestic Life   Number of elements that affect the Plan of Care: 4+: HIGH COMPLEXITY   CLINICAL PRESENTATION:   Presentation: Evolving clinical presentation with changing clinical characteristics: MODERATE COMPLEXITY   CLINICAL DECISION MAKIN [facility] Inpatient Short Form  How much difficulty does the patient currently have. .. Unable A Lot A Little None   1. Turning over in bed (including adjusting bedclothes, sheets and blankets)? [ ] 1   [ ] 2   [ ] 3   [X] 4   2. Sitting down on and standing up from a chair with arms ( e.g., wheelchair, bedside commode, etc.)   [ ] 1   [ ] 2   [ ] 3   [X] 4   3. Moving from lying on back to sitting on the side of the bed? [ ] 1   [ ] 2   [ ] 3   [X] 4   How much help from another person does the patient currently need. .. Total A Lot A Little None   4. Moving to and from a bed to a chair (including a wheelchair)? [ ] 1   [ ] 2   [ ] 3   [X] 4   5. Need to walk in hospital room? [ ] 1   [ ] 2   [X] 3   [ ] 4   6. Climbing 3-5 steps with a railing? [ ] 1   [ ] 2   [X] 3   [ ] 4   © , Trustees of [address] [place] [address] [address] [address], under license to [employer].  All rights reserved    Score:  Initial: 22 Most Recent: X (Date: -- )     Interpretation of Tool:  Represents activities that are increasingly more difficult (i.e. Bed mobility, Transfers, Gait). Score 24 23 22-20 19-15 14-10 9-7 6       Modifier CH CI CJ CK CL CM CN         · Mobility - Walking and Moving Around:               - CURRENT STATUS:    CJ - 20%-39% impaired, limited or restricted               - GOAL STATUS:           CJ - 20%-39% impaired, limited or restricted               - D/C STATUS:                       ---------------To be determined---------------  [place] / Product Type: Medicare /       Medical Necessity:     · Patient is expected to demonstrate progress in functional technique to increase independence with mobility and gait. .  Reason for Services/Other Comments:  · Patient continues to require present interventions due to patient's inability to function at baseline. .   Use of outcome tool(s) and clinical judgement create a POC that gives a: Questionable prediction of patient's progress: MODERATE COMPLEXITY                 TREATMENT:   (In addition to Assessment/Re-Assessment sessions the following treatments were rendered)   Pre-treatment Symptoms/Complaints:  none  Pain: Initial:   Pain Intensity 1: 0  Post Session:  Seemed to being doing better. Agreed to be up in the chair. Therapeutic Activity: (    15): Therapeutic activities including Bed transfers, Chair transfers and Ambulation on level ground to improve mobility. Required minimal   to promote motor control of bilateral, upper extremity(s), lower extremity(s). Braces/Orthotics/Lines/Etc:   ·    Treatment/Session Assessment:    · Response to Treatment:  good  · Interdisciplinary Collaboration:  · Registered Nurse  · After treatment position/precautions:  · Up in chair  · Call light within reach  · RN notified  · Compliance with Program/Exercises: Will assess as treatment progresses.   · Recommendations/Intent for next treatment session: \"Next visit will focus on advancements to more challenging activities and reduction in assistance provided\".   Total Treatment Duration:  PT Patient Time In/Time Out  Time In: 1145  Time Out: 1200     Janine Martin, PT

## 2017-06-08 NOTE — PROGRESS NOTES
GI DAILY PROGRESS NOTE    Admit Date:  6/5/2017    Today's Date:  6/8/2017    CC:  Diarrhea    Subjective:     Patient reports that his last BM was last night at 8pm. Last dose of Lomotil was 6/7/17 at 2049. Stool studies have not been collected. He continues to have a \"queasy feeling\" in his stomach. Denies any abdominal pain. Reports nausea but no vomiting. He has been afebrile. Medications:   Current Facility-Administered Medications   Medication Dose Route Frequency    0.45% sodium chloride infusion  100 mL/hr IntraVENous CONTINUOUS    diphenoxylate-atropine (LOMOTIL) tablet 2 Tab  2 Tab Oral QID PRN    dronabinol (MARINOL) capsule 2.5 mg  2.5 mg Oral ACB&D    ciprofloxacin (CIPRO) 400 mg IVPB (premix)  400 mg IntraVENous Q12H    LORazepam (ATIVAN) tablet 1-2 mg  1-2 mg Oral Q6H PRN    loperamide (IMODIUM) capsule 2 mg  2 mg Oral PRN    pantoprazole (PROTONIX) tablet 40 mg  40 mg Oral ACB    sodium chloride (NS) flush 5-10 mL  5-10 mL IntraVENous Q8H    sodium chloride (NS) flush 5-10 mL  5-10 mL IntraVENous PRN    acetaminophen (TYLENOL) tablet 650 mg  650 mg Oral Q4H PRN    HYDROcodone-acetaminophen (NORCO) 5-325 mg per tablet 1 Tab  1 Tab Oral Q4H PRN    ondansetron (ZOFRAN) injection 4 mg  4 mg IntraVENous Q4H PRN    enoxaparin (LOVENOX) injection 40 mg  40 mg SubCUTAneous Q24H    prochlorperazine (COMPAZINE) injection 5 mg  5 mg IntraVENous Q6H PRN    lidocaine (XYLOCAINE) 2 % viscous solution 15 mL  15 mL Mouth/Throat PRN     Facility-Administered Medications Ordered in Other Encounters   Medication Dose Route Frequency    saline peripheral flush soln 10 mL  10 mL InterCATHeter PRN       Review of Systems:  ROS was obtained, with pertinent positives as listed above. No chest pain or SOB.     Diet:  Gi soft    Objective:   Vitals:  Visit Vitals    /79 (BP 1 Location: Left arm, BP Patient Position: At rest)    Pulse 82    Temp 97.4 °F (36.3 °C)    Resp 18    Wt 77.8 kg (171 lb 8 oz)    SpO2 95%    BMI 26.08 kg/m2     Intake/Output:     06/06 1901 - 06/08 0700  In: 2858 [P.O.:100; I.V.:3290]  Out: 900 [Urine:400]  Exam:  General appearance:  Drowsy, cooperative, no distress  Lungs: clear to auscultation bilaterally anteriorly  Heart: regular rate and rhythm  Abdomen: soft, non-tender. Bowel sounds normal. No masses, no organomegaly  Extremities: extremities normal, atraumatic, no cyanosis or edema  Neuro:  alert and oriented    Data Review (Labs):    Recent Labs      06/08/17   0350  06/07/17   0247  06/07/17   0010  06/06/17   0300   WBC  2.9*  3.4*   --   2.6*   HGB  11.3*  12.7*  12.6*  13.0*   HCT  32.0*  36.7*  36.8*  37.4*   PLT  84*  95*   --   97*   MCV  95.5  97.6   --   97.1   NA  142  148*   --   147*   K  3.5  3.4*   --   3.6   CL  111*  119*   --   116*   CO2  22  18*   --   21   BUN  15  20   --   23   CREA  0.84  0.84   --   0.92   CA  8.7  8.8   --   8.7   MG   --   1.9   --   2.0   GLU  89  123*   --   114*     CT A/P with Contrast 6/5/17:  IMPRESSION  IMPRESSION:  1. Mild gaseous distention of the left colon at the level of anastomotic  staples without small bowel distention to suggest significant obstruction. 2. Stable hepatic metastatic disease.     DATE OF OPERATION: 09/13/2016  SURGEON: Precious Gandara MD  PREOPERATIVE DIAGNOSIS: Colon cancer with metastatic liver   disease.   POSTOPERATIVE DIAGNOSES   1. Severe diverticulosis involving the left colon. 2. Moderate sigmoid stricture at 20 cm.    NAME OF PROCEDURE: colonoscopy with sigmoid biopsy and tattooing.     Date of Procedure: 10/10/2016   Preoperative Diagnosis: colon cancer with numerous liver metastases  Postoperative Diagnosis: colon cancer with numerous liver metastases  Procedure(s):  EXPLORATORY LAPAROTOMY  LIVER segmentectomy ( 2,3, 4A, 4B, 5/8, 6, 7, 6/7)  MICROWAVE ABLATION (between RHV and M[facility])  Cholecystectomy  SIGMOID COLON RESECTION with primary anastomosis  Mobilization of splenic flexure  Assessment: Principal Problem:    Small bowel obstruction ([facility] [address] (6/5/2017)    Active Problems:    Metastatic colon cancer to liver [facility]) (10/10/2016)      76 yo male with history of Stage IV colon cancer who is seen  for nausea, vomiting, diarrhea, blood in stool which started 1 week ago following his chemotherapy. Started a new Chemo Rx recently but has had it several times without GI symptoms. 6/8/17: NO BM today. Last BM was 6/7/17 at 8pm. Last dose of Lomotil was at 2049 on 6/7/17. Plan:   1. Await Stool Studies  2. Started Cipro empirically 6/7/17. Follow Response. 3. If symptoms continue, endoscopy and possibly augment antibiotics. Laila Keita NP    Patient is seen and examined in collaboration with Dr. Daryl Magaña. Assessment and plan as per Dr. Daryl Magaña. ATTENDING NOTE:  I have seen and examined the patient along with my NP/PA. The assessment and plan above is my own. His diarrhea has slowed, (I doubt that the single dosage of Cipro is responsible). Continued vomiting and pain.   Plan EGD and flex sig to determine mucosal health after last chemoRx given the d dramatic, and unusual, GI symptoms after tolerating this regimen in the past.    Ni Lopez MD

## 2017-06-09 NOTE — PROGRESS NOTES
Patient had uneventful night, rested well. Voiding without difficulty, no BM. No C/O pain, one episode of nausea without vomiting. PRN Zofran administered IVP. Mag-Ox 400 mg PO ordered TID for mag level of 1.3. Consent signed for EGD.

## 2017-06-09 NOTE — PROGRESS NOTES
ATTENDING NOTE:  I have seen and examined the patient along with my NP/PA. The assessment and plan above is my own.       Soraya Lindsay MD

## 2017-06-09 NOTE — PROCEDURES
Flexible Sigmoidoscopy Procedure Note    Procedure: Sigmoidoscopy    Pre-operative Diagnosis: Diarrhea     Post-operative Diagnosis: Normal study. Recommendations:  Supportive care    Surveillance interval: None    Anesthesia/Sedation: None. Pre-Procedure Physical:  Airway: clear   Heart: normal S1and S2    Lungs: clear bilateral  Abdomen: soft, nontender, bowel sounds present and normal in all quads   Mental Status: awake, alert and oriented to person, place and time        Procedure Details:  Informed consent was obtained for the procedure. Risks of perforation, hemorrhage or other adverse event were discussed prior to the procedure. The patient was placed in the left lateral decubitus position. A rectal examination was performed. The colonoscope was inserted into the rectum and advanced under to the mid sigmoid. The quality of the colonic preparation was poor with adherent stool coating the walls. A careful inspection was made as the colonoscope was withdrawn, including a retroflexed view of the rectum; findings and interventions are described below. Findings: . COLON:  The colonic mucosa was mostly coated with stool but where visible, was normal with normal vascularity. There was no diverticulosis, inflammatory changes, mucosal polyps, raised lesions, vascular ectasias or abnormal pigmentation. ANUS/RECTUM: Anal exam reveals no masses or hemorrhoids, sphincter tone is normal. Rectal exam reveals no masses or hemorrhoids. Prostate exam was not well palpated. Direct and retroflexed views of the rectum were normal without significant hemorrhoidal engorgement or inflammation, polyps or masses. EBL: 0cc's. PATH:  None. Complications: None; patient tolerated the procedure well. Attending Attestation: I performed the procedure.     Tonio Lynn MD

## 2017-06-09 NOTE — PROCEDURES
Esophagogastroduodenoscopy (EGD) Procedure Note    Procedure: EGD    Pre-operative Diagnosis: Nause and vomiting     Post-operative Diagnosis: Small hiatal hernia    Recommendations:  See inpt notes. Follow up:   Inpt f/u    Anesthesia/Sedation: MAC, (see separate report). Procedure Details:  Informed consent was obtained for the procedure, including sedation. Risks of perforation, hemorrhage, adverse drug reaction and aspiration were discussed. The patient was placed in the left lateral decubitus position. Based on the pre-procedure assessment, including review of the patient's medical history, medications, allergies, and review of systems, he had been deemed to be an appropriate candidate for anesthesia. The patient was monitored continuously with ECG tracing, pulse oximetry, blood pressure monitoring, and direct observations. Please refer to the anesthesia record for details and dosages of medications. The esophagus was intubated with direct visualization. The esophagus, stomach and duodenum were traversed to the full extent of the endoscope. Retroflexed views of the cardia and fundus were performed. The stomach was fully insufflated and deflated. Findings:   OROPHARYNX: The oropharynx was briefly viewed on entry and withdrawal with very brief evaluation of the cords, arytenoids and pyriform sinuses. No abnormalities found. ESOPHAGUS:  The esophagus was normal with an intact squamocolumnar z-line without erosions or evidence of Pineda's intestinal metaplasia. The mucosa was normal.  There were no strictures, rings or noticeable narrowing of the lumen. The endoscope was easily passed into the gastric pouch and there was a small hiatal hernia. STOMACH: The gastric pouch inflated and deflated normally. The mucosal surface and gastric rugae were normal without erosions, ulcerations, raised lesions, vascular ectasias or other anomalies.   The pylorus was patent to passage of the endoscope without difficulty. DUODENUM:  The endoscope was easily passed into the third section of the duodenum. The villous mucosa was normal without blunting or scalloped folds. There were no mucosal erosions, ulcerations, raised lesions or vascular ectasias. EBL: 0 cc's. Pathology speciment:  None. Complications: No apparent complications and the patient tolerated sedation and the procedure well. Attending Attestation: I performed the procedure.     Caren Carvalho MD

## 2017-06-09 NOTE — PROGRESS NOTES
Bedside shift change report given to  (oncoming nurse) by Brooks Montenegro RN (offgoing nurse). Report included the following information SBAR, Kardex and MAR. Pt to GI lab for scheduled procedures. C/O less nausea today. VSS. No needs at present.

## 2017-06-09 NOTE — PROGRESS NOTES
TRANSFER - IN REPORT:    Verbal report received from [facility]) on Yanique Res  being received from GI lab(unit) for routine post - op      Report consisted of patients Situation, Background, Assessment and   Recommendations(SBAR). Information from the following report(s) SBAR, Kardex and MAR was reviewed with the receiving nurse. Opportunity for questions and clarification was provided. Assessment completed upon patients arrival to unit and care assumed.

## 2017-06-09 NOTE — PROGRESS NOTES
1058-After approximately 500 cc of tap water enema instillation, results were hard balls approx 1 inch round of tomasz colored stool and soft stool. 2nd half (approx 500cc) of tap water enema instilled. Held for approximately 2 minutes and then produced approx 2-3 mm sized multiple balls of stool.

## 2017-06-09 NOTE — PROGRESS NOTES
Mr. Camryn Caldera has been getting prepped for flex sig and EGD this afternoon. He politely declined PT. He promised he would get up and walk later today.     Pratima Martin, PT

## 2017-06-09 NOTE — PROGRESS NOTES
[facility]        Inpatient Hematology / Oncology Progress Note      Admission Date: 2017 12:35 AM  Reason for Admission/Hospital Course: Diarrhea, unspecified type [R19.7]  Nausea [R11.0]      24 Hour Events:  Afebrile, VSS  Reports no BM  C Diff negative. No BM since other stool studies ordered. Able to eat better yesterday, NPO today for EGD, flex sig  No complaints this AM, feeling \"good\"      ROS:  Constitutional: Negative for fever, chills, weakness, fatigue. CV: Negative for chest pain, palpitations, edema. Respiratory: Negative for dyspnea, cough, wheezing. GI: Negative for abdominal pain, vomiting, diarrhea, nausea    10 point review of systems is otherwise negative with the exception of the elements mentioned above in the HPI. Allergies   Allergen Reactions    Oxaliplatin Shortness of Breath, Rash and Other (comments)     Severe abdominal pain       OBJECTIVE:  Patient Vitals for the past 8 hrs:   BP Temp Pulse Resp SpO2   17 0715 144/77 97.6 °F (36.4 °C) 71 18 92 %   17 0359 124/58 97.9 °F (36.6 °C) 81 19 93 %     Temp (24hrs), Av.2 °F (36.8 °C), Min:97.6 °F (36.4 °C), Max:99.2 °F (37.3 °C)     0701 -  1900  In: 1018 [I.V.:1018]  Out: -     Physical Exam:  Constitutional: Well-developed male in no acute distress, lying comfortably in the hospital bed. HEENT: Normocephalic and atraumatic. Oropharynx is clear, mucous membranes are moist.  Extraocular muscles are intact. Sclerae anicteric. Neck supple without JVD. No thyromegaly present. Lymph node   Deferred   Skin Warm and dry. No bruising and no rash noted. No erythema. No pallor. Respiratory Lungs are clear to auscultation bilaterally without wheezes, rales or rhonchi, normal air exchange without accessory muscle use. CVS Normal rate, regular rhythm and normal S1 and S2. No murmurs, gallops, or rubs. Abdomen Soft, nontender and nondistended, normoactive bowel sounds. No palpable mass. No hepatosplenomegaly. Neuro Grossly nonfocal with no obvious sensory or motor deficits. MSK Normal range of motion in general.  No edema and no tenderness. Psych Appropriate mood and affect. Line without erythema, edema, or tenderness    Labs:      Recent Labs      06/09/17   0350  06/08/17   0350  06/07/17   0247   WBC  4.9  2.9*  3.4*   RBC  3.30*  3.35*  3.76*   HGB  11.0*  11.3*  12.7*   HCT  30.7*  32.0*  36.7*   MCV  93.0  95.5  97.6   MCH  33.3*  33.7*  33.8*   MCHC  35.8*  35.3*  34.6   RDW  17.5*  18.0*  18.3*   PLT  86*  84*  95*   GRANS  84*  48  60   LYMPH  11*  32  25   MONOS  5  15*  10   EOS  0*  4  5   BASOS  0  1  0   IG  0.2  0.3  0.0   DF  AUTOMATED  AUTOMATED  AUTOMATED   ANEU  4.1  1.4*  2.0   ABL  0.5  0.9  0.9   ABM  0.3  0.4  0.4   DINA  0.0  0.1  0.2   ABB  0.0  0.0  0.0   AIG  0.0  0.0  0.0        Recent Labs      06/09/17   0350 06/08/17   0350  06/07/17   0247   NA  141  142  148*   K  3.9  3.5  3.4*   CL  109*  111*  119*   CO2  21  22  18*   AGAP  11  9  11   GLU  200*  89  123*   BUN  17  15  20   CREA  0.94  0.84  0.84   GFRAA  >60  >60  >60   GFRNA  >60  >60  >60   CA  8.3  8.7  8.8   MG  1.3*   --   1.9         Imaging:  CT OF THE ABDOMEN AND PELVIS WITH CONTRAST: 6/5/2017      CLINICAL HISTORY: Intermittent low-grade small bowel obstruction with a  history of metastatic colon cancer.      TECHNIQUE: Oral and nonionic intravenous contrast was administered, and the  abdomen and pelvis were scanned with spiral technique.      COMPARISON: June 1, 2017.       CT ABDOMEN FINDINGS: Small right pleural effusion is again noted. Heterogeneous  hypodense lesion in segment 4 of the liver measures 6.8 x 5.3 cm today, not  significantly changed. A 1.5 cm inferior caudate nodule is likewise unchanged. Cresentic low-attenuation collection along the posterior margin of the liver  remains stable. The spleen, pancreas, adrenals, and kidneys appear  unremarkable.  No significant small bowel dilatation.      CT PELVIS FINDINGS: There is mild gaseous distention of the left colon at the  level of anastomotic staples. No pathologically enlarged lymph nodes or free  fluid is evident. The prostate is not enlarged. Extensive aortoiliac  atherosclerotic calcifications without definite aneurysm. Bone windows  demonstrate no definite aggressive process, accounting for degenerative changes.      IMPRESSION:  1. Mild gaseous distention of the left colon at the level of anastomotic  staples without small bowel distention to suggest significant obstruction. 2. Stable hepatic metastatic disease. ASSESSMENT:    Problem List  Date Reviewed: 5/30/2017          Codes Class Noted    * (Principal)Small bowel obstruction ([facility] ICD-10-CM: K56.69  ICD-9-CM: 560.9  6/5/2017        Dehydration ICD-10-CM: E86.0  ICD-9-CM: 276.51  6/2/2017        Malignant neoplasm of colon ([facility] (Chronic) ICD-10-CM: C18.9  ICD-9-CM: 153.9  10/10/2016        Metastatic colon cancer to liver [facility]) (Chronic) ICD-10-CM: C18.9, C78.7  ICD-9-CM: 153.9, 197.7  10/10/2016        Hand foot syndrome ICD-10-CM: L27.1  ICD-9-CM: 693.0  7/6/2016        Dysgeusia ICD-10-CM: R43.2  ICD-9-CM: 781.1  6/10/2014        Fatigue ICD-10-CM: R53.83  ICD-9-CM: 780.79  10/29/2013        Urinary urgency ICD-10-CM: R39.15  ICD-9-CM: 397.65  10/26/2012    Overview Signed 10/26/2012  2:16 PM by Callum Ruiz     10-26-12 start flomax                 Mr. Sharon Vnetura is a 66yo male patient of Dr. Ashley Zhou with Stage IV colon cancer. He was admitted for N/V/D with possible SBO. PLAN:  Stage IV Colon Cancer  - Currently undergoing FOLFIRI/Avastin, last treatment on 5/30. Next treatment scheduled for 6/13.     N/V/D  - Con't IVF/antiemetics  - Clear liquid diet  - Check stool for C Diff  - CT abdomen neg for ileus or SBO  6/6 Stool negative for C Diff.   Was able to tolerate full liquid diet yesterday with Ensure, but had episodes of vomiting and diarrhea last night. Per nursing, he has been refusing antiemetics stating that \"it makes me more sick\". Refused IVF - \"it makes me have an accident in my pants\". Added ativan for breakthrough nausea/vomiting. Imodium for diarrhea, 4mg now then 2mg after each loose stool, up to 16mg/day. May need to add lomotil if diarrhea persists. Will advance diet to bland GI soft diet, per pt request.  6/7  Diarrhea persisting despite immodium. Lomotil added prn. Began having traces of bright red blood in stool last night. ?Irritation from frequent BMs. No obvious change in Hgb, remaining stable. Will consult GI.  6/8 GI expanded stool studies to include O&P, Giardia, Lactoferrin, Stool cx. Pt has not had BM since orders were placed yesterday. GI started on Cipro empirically. Reports one episode of emesis last night, relieved by zofran. Intermittent nausea persisting, but pt states antiemetics as well as marinol are helping. 6/9 No BM yesterday. Vomiting resolved. GI to perform EGD and flex sig today to determine mucosal health given the dramatic and unusual GI symptoms after previously tolerating this chemo regimen in the past.     Pancytopenia secondary to chemotherapy  - Counts stable. Con't to monitor  6/8 Noted decrease in Hgb, 11.3 from 12.7 yesterday. Will con't to monitor. 6/9 Hgb stable    Electrolyte Imbalance  - Replace prn per [facility] SOPs  6/7 Na+ 148. Change IVF to 1/2NS. K+ 3.4 - replace per SOPs  6/8 Improved - Na+ 142, K+ 3.5  6/9 Mg+ 1.3 - replace    Malnutrition  - RD following  6/7 Pt requested advancement in diet yesterday, but states he has no appetite to actually eat it, but is able to drink at least 3 Ensures daily. Will start on Marinol 2.5mg twice daily before meals. 6/8  Pt reports more appetite with Marinol. Was able to eat dinner last night as well as breakfast this AM.  Will give dex 10mg x 1 today for nausea. 6/9 NPO this AM for EGD.   Pt is eager to eat after procedure. Weakness  - PT consulted  6/8 Con't to work with PT     Continue home meds  [facility] SOPs  DVT Prophylaxis: Lovenox    Disposition:  Anticipate discharge in 2-3 days, if patient con't to do well and is able to tolerate eating and advancement in diet. [facility]  [address] [address]  Office : [phone]  Fax : [phone]       Attending Addendum (seen 6/9/17): I personally evaluated the patient with Dana Higuera N.P.,  and agree with the assessment, findings and plan as documented. Appears stable, heart regular without murmur, lungs clear, abdomen benign. Clinically doing better.  GI planning for endoscopic eval.               Marnie Ochoa MD  [address] [place] [address]  [address] [address]  Office : [phone]  Fax : [phone]

## 2017-06-09 NOTE — PROGRESS NOTES
TRANSFER - IN REPORT:    Verbal report received from [facility], RN on Hillary Anglin  being received from [id number] for ordered procedure. Report consisted of patients Situation, Background, Assessment and   Recommendations(SBAR). Information from the following report(s) SBAR was reviewed with the receiving nurse. Opportunity for questions and clarification was provided. Assessment completed upon patients arrival to unit and care assumed.

## 2017-06-09 NOTE — ROUTINE PROCESS
Report called to Dhruv Vogt RN 5th floor . Patient VSS , wife Noris Solares spoke with Dr Kate Nelson. Transport requested .

## 2017-06-09 NOTE — PROGRESS NOTES
EGD and Flex sig were essentially normal, (small hiatal hernia). He is greatly improved. I assume he reacted badly to chemoTx or had a GI bug, likely viral which he has spontaneously recovered from    Advance diet slowly and treat symptoms. I'll sign off.  F//u as needed.

## 2017-06-10 NOTE — DISCHARGE SUMMARY
[facility]: In Patient Hematology / Oncology Discharge Summary Note    Patient ID:  Murali Dai  [id number]  89 y.o.  1950    Admit Date: 6/5/2017    Discharge Date: 6/10/2017    Admission Diagnoses: Diarrhea, unspecified type [R19.7]  Nausea [R11.0]    Discharge Diagnoses:  Principal Diagnosis: Small bowel obstruction ([address]  Principal Problem:    Small bowel obstruction ([address] (6/5/2017)    Active Problems:    Metastatic colon cancer to liver [facility]) (10/10/2016)        Hospital Course:  Mr. Alexandria Elliott is a 75 y.o. male admitted on 6/5/2017 with a primary diagnosis of small bowel obstruction ([address] He is a known patient of Dr. Susana Mansfield with Stage IV colon cancer, currently undergoing FOLFIRI/Avastin with the last treatment on 5/30. He presented to [facility] ED with c/o N/V/D that started on the night of 6/1. He reports having 5-6 episodes of watery diarrhea daily. Mild lower abdominal pain. N/V improved with IVF and antiemetics at [facility]. Abd xray at [facility] was significant for air fluid levels and questionable for ileus vs. early SBO. CT was recommended. Pt was transferred from [facility] to [address] [place] [address] to resume care. CT showed   mild gaseous distention of the left colon at the level of anastomotic staples without small bowel distention to suggest significant obstruction and stable hepatic metastatic disease. GI saw him and performed an EGD and flex sigmoid which were both essentially normal overall. He is feeling much improved with no pain or nausea. He has drank two Ensure today and feeling like another one now. He is requesting discharge to home. Will discharge him today and follow up in the office on 06/20 to consider next cycle of chemotherapy at that time. Will hold next week for recovery. Encouraged frequent activity throughout the day and rest as needed to combat fatigue. Continue good oral nutrition and hydration.  He knows to call with any fevers, bleeding, uncontrolled side effects from treatment or any other worrisome/concerning symptoms. Consults:  IP CONSULT TO ONCOLOGY  IP CONSULT TO GASTROENTEROLOGY    Significant Diagnostic Studies:   CT OF THE ABDOMEN AND PELVIS WITH CONTRAST: 17     CLINICAL HISTORY: Intermittent low-grade small bowel obstruction with a  history of metastatic colon cancer.     TECHNIQUE: Oral and nonionic intravenous contrast was administered, and the  abdomen and pelvis were scanned with spiral technique.     COMPARISON: 2017.      CT ABDOMEN FINDINGS: Small right pleural effusion is again noted. Heterogeneous  hypodense lesion in segment 4 of the liver measures 6.8 x 5.3 cm today, not  significantly changed. A 1.5 cm inferior caudate nodule is likewise unchanged. Cresentic low-attenuation collection along the posterior margin of the liver  remains stable. The spleen, pancreas, adrenals, and kidneys appear  unremarkable. No significant small bowel dilatation.     CT PELVIS FINDINGS: There is mild gaseous distention of the left colon at the  level of anastomotic staples. No pathologically enlarged lymph nodes or free  fluid is evident. The prostate is not enlarged. Extensive aortoiliac  atherosclerotic calcifications without definite aneurysm. Bone windows  demonstrate no definite aggressive process, accounting for degenerative changes.     IMPRESSION  IMPRESSION:  1. Mild gaseous distention of the left colon at the level of anastomotic  staples without small bowel distention to suggest significant obstruction. 2. Stable hepatic metastatic disease.     Allergies   Allergen Reactions    Oxaliplatin Shortness of Breath, Rash and Other (comments)     Severe abdominal pain       OBJECTIVE:  Patient Vitals for the past 8 hrs:   BP Temp Pulse Resp SpO2   06/10/17 1105 141/68 97.7 °F (36.5 °C) 90 18 93 %   06/10/17 0730 132/72 97.4 °F (36.3 °C) 67 18 94 %     Temp (24hrs), Av.5 °F (36.4 °C), Min:96.7 °F (35.9 °C), Max:98 °F (36.7 °C)         Physical Exam:  Constitutional: Oriented to person, place, and time. Well-developed and well-nourished. HEENT: Normocephalic and atraumatic. Oropharynx is clear and moist.   Conjunctivae and EOM are normal. No scleral icterus. Neck supple. Skin Warm and dry. No bruising and no rash noted. No erythema. No pallor. Respiratory Effort normal and breath sounds normal.  No respiratory distress. No wheezes. No rales. No tenderness. CVS Normal rate, regular rhythm and normal heart sounds. Exam reveals no gallop, no friction and no rub. No murmur heard. Abdomen Soft. Bowel sounds are normal. Exhibits no distension. There is no tenderness. There is no rebound and no guarding. Neuro No obvious focal deficits. MSK Normal range of motion. No edema and no tenderness.    Psych Normal mood, affect, behavior, judgment and thought content      Labs:  Recent Labs      06/10/17   0359  06/09/17   0350 06/08/17 0350   WBC  5.1  4.9  2.9*   RBC  3.04*  3.30*  3.35*   HGB  10.1*  11.0*  11.3*   HCT  28.5*  30.7*  32.0*   MCV  93.8  93.0  95.5   MCH  33.2*  33.3*  33.7*   MCHC  35.4*  35.8*  35.3*   RDW  18.1*  17.5*  18.0*   PLT  93*  86*  84*   GRANS  75  84*  48   LYMPH  17  11*  32   MONOS  7  5  15*   EOS  1  0*  4   BASOS  0  0  1   IG  0.4  0.2  0.3   DF  AUTOMATED  AUTOMATED  AUTOMATED   ANEU  3.8  4.1  1.4*   ABL  0.8  0.5  0.9   ABM  0.4  0.3  0.4   DINA  0.0  0.0  0.1   ABB  0.0  0.0  0.0   AIG  0.0  0.0  0.0    Recent Labs      06/10/17   0359 06/09/17   0350 06/08/17   0350   NA  149*  141  142   K  3.5  3.9  3.5   CL  114*  109*  111*   CO2  29  21  22   AGAP  6*  11  9   GLU  92  200*  89   BUN  12  17  15   CREA  0.75*  0.94  0.84   GFRAA  >60  >60  >60   GFRNA  >60  >60  >60   CA  8.4  8.3  8.7   SGOT  29   --    --    AP  167*   --    --    TP  4.9*   --    --    ALB  2.3*   --    --    GLOB  2.6   --    --    AGRAT  0.9*   --    --    MG  2.1  1.3*   --        ASSESSMENT:    Principal Problem: Small bowel obstruction ([facility] (6/5/2017)    Active Problems:    Metastatic colon cancer to liver ([facility] (10/10/2016)      Current Discharge Medication List      START taking these medications    Details   dronabinol (MARINOL) 2.5 mg capsule Take 1 Cap by mouth Before breakfast and dinner. Max Daily Amount: 5 mg. Qty: 60 Cap, Refills: 2      ciprofloxacin HCl (CIPRO) 500 mg tablet Take 1 Tab by mouth two (2) times a day for 3 days. Qty: 6 Tab, Refills: 0         CONTINUE these medications which have NOT CHANGED    Details   ondansetron hcl (ZOFRAN) 8 mg tablet Take 1 Tab by mouth every eight (8) hours as needed for Nausea. Qty: 90 Tab, Refills: 1    Associated Diagnoses: Metastatic colon cancer to liver (HCC)      prochlorperazine (COMPAZINE) 10 mg tablet Take 1 Tab by mouth every six (6) hours as needed. Qty: 90 Tab, Refills: 1    Associated Diagnoses: Metastatic colon cancer to liver (HCC)      omeprazole (PRILOSEC) 20 mg capsule Take 1 capsule by mouth daily. Qty: 30 capsule, Refills: 3      magnesium oxide (MAG-OX) 400 mg tablet Take 800 mg by mouth two (2) times a day. Associated Diagnoses: Colon cancer ([facility]      pyridoxine (VITAMIN B-6) 50 mg tablet Take 1 Tab by mouth daily. Qty: 100 Tab, Refills: 3      multivitamin (ONE A DAY) tablet Take 1 Tab by mouth daily. DOCUSATE CALCIUM (STOOL SOFTENER PO) Take 2 Tabs by mouth as needed. Associated Diagnoses: Colon cancer ([facility]           PLAN:    Follow-up Appointments   Procedures    FOLLOW UP VISIT Appointment in: Other [phone] 06/13 pre-chemo visit as well as chemotherapy and re-schedule for 06/20 with labs prior. Cancel 06/13 pre-chemo visit as well as chemotherapy and re-schedule for 06/20 with labs prior. Standing Status:   Standing     Number of Occurrences:   1     Order Specific Question:   Appointment in     Answer:    Other (Specify)           Esa Crespo NP  [address] [facility]  [address] [place] [id number]  Office : [phone]  Fax : [phone]            Attending Addendum:  I personally evaluated the patient with Trent Kumar N.P.,  and agree with the assessment, findings and plan as documented. Appears well, heart regular without murmur, lungs clear, abdomen benign. Clinically doing better, w good po intake. Recent GI endoscopic eval unremarkable. F/u in oncology scheduled as above. I spent 32 minutes on evaluation, management, counseling and discharge planning on patient.               Vicki Flores MD  [facility]  [address] [address]  Office : [phone]  Fax : [phone]

## 2017-06-10 NOTE — PROGRESS NOTES
[facility] Time allotted for questions and concerns.

## 2017-06-10 NOTE — PROGRESS NOTES
Discharge instructions reviewed with patient, understanding verbalized, prescription given, port packed and de-accessed, patient assisted to front exit where transportation awaits. All belongings with patient at time of discharge.

## 2017-06-10 NOTE — DISCHARGE INSTRUCTIONS
OUTPATIENT INFUSION [facility]ENTER                                                             GENERAL DISCHARGE INSTRUCTIONS           1. If you have any signs or symptoms of infection, or you have recently been diagnosed and treated for an infection, contact your physician prior to receiving Remicade. 2. Contact your doctor right away if you have signs of infection, such as fever, chills, sore throat, flu symptoms. 3. Contact your doctor right away if you have easy bruising or bleeding, unusually pale skin, or unusual weakness. 4.  Check with your physician before receiving a \"live\" vaccine during therapy due to a possible decrease in ability to fight infections. 5.  Avoid contact with individuals with known infections. 6.   Wash hands frequently. 7.  All medications can potentially cause side effects. Possible general side effects may include    · Mild abdominal pain, fatigue or headache;  ·  Stuffy nose, sinus pain or mild skin rash;  ·  Mild joint pain or swelling, malaise/fatigue;  ·  Mild hair loss. 6.  Signs/Symptoms of an allergic reaction may require immediate medical attention:     ·  Skin - Redness, itching, swelling, blistering, weeping, crusting, rash,   eruptions, or hives;  ·  Lungs - Wheezing, cough, or shortness of breath;  ·  Cardiac - changes in blood pressure, swelling of extremities,   unexplained weakness, chest pain or tightness;  ·  Swelling of the face, eyelids, lips, tongue, or throat;  severe headache;  ·  Stuffy nose, runny nose (clear, thin discharge), sneezing;   ·  Red (bloodshot), itchy, swollen, or watery eyes;  ·  Stomach  pain, nausea, vomiting, or bloody diarrhea. Contact your physician's office with questions or concerns or if you experience any of the above symptoms.                   Desiree Palacios, Dirk 6/10/2017  Martha Castro RN          Dehydration: Care Instructions  Your Care Instructions  Dehydration happens when your body loses too much fluid. This might happen when you do not drink enough water or you lose large amounts of fluids from your body because of diarrhea, vomiting, or sweating. Severe dehydration can be life-threatening. Water and minerals called electrolytes help put your body fluids back in balance. Learn the early signs of fluid loss, and drink more fluids to prevent dehydration. Follow-up care is a key part of your treatment and safety. Be sure to make and go to all appointments, and call your doctor if you are having problems. It's also a good idea to know your test results and keep a list of the medicines you take. How can you care for yourself at home? · To prevent dehydration, drink plenty of fluids, enough so that your urine is light yellow or clear like water. Choose water and other caffeine-free clear liquids until you feel better. If you have kidney, heart, or liver disease and have to limit fluids, talk with your doctor before you increase the amount of fluids you drink. · If you do not feel like eating or drinking, try taking small sips of water, sports drinks, or other rehydration drinks. · Get plenty of rest.  To prevent dehydration  · Add more fluids to your diet and daily routine, unless your doctor has told you not to. · During hot weather, drink more fluids. Drink even more fluids if you exercise a lot. Stay away from drinks with alcohol or caffeine. · Watch for the symptoms of dehydration. These include:  ¨ A dry, sticky mouth. ¨ Dark yellow urine, and not much of it. ¨ Dry and sunken eyes. ¨ Feeling very tired. · Learn what problems can lead to dehydration. These include:  ¨ Diarrhea, fever, and vomiting. ¨ Any illness with a fever, such as pneumonia or the flu.   ¨ Activities that cause heavy sweating, such as endurance races and heavy outdoor work in hot or humid weather. ¨ Alcohol or drug abuse or withdrawal.  ¨ Certain medicines, such as cold and allergy pills (antihistamines), diet pills (diuretics), and laxatives. ¨ Certain diseases, such as diabetes, cancer, and heart or kidney disease. When should you call for help? Call 911 anytime you think you may need emergency care. For example, call if:  · You passed out (lost consciousness). Call your doctor now or seek immediate medical care if:  · You are confused and cannot think clearly. · You are dizzy or lightheaded, or you feel like you may faint. · You have signs of needing more fluids. You have sunken eyes and a dry mouth, and you pass only a little dark urine. · You cannot keep fluids down. Watch closely for changes in your health, and be sure to contact your doctor if:  · You are not making tears. · Your skin is very dry and sags slowly back into place after you pinch it. · Your mouth and eyes are very dry. Where can you learn more? Go to http://leni-sharmin.info/. Enter D695 in the search box to learn more about \"Dehydration: Care Instructions. \"  Current as of: May 27, 2016  Content Version: 11.2  © 9635-0142 [employer]. Care instructions adapted under license by [employer] (which disclaims liability or warranty for this information). If you have questions about a medical condition or this instruction, always ask your healthcare professional. [place] [address] any warranty or liability for your use of this information.

## 2017-06-11 NOTE — PROGRESS NOTES
Initial outreach attempt to patient was unsuccessful. Second outreach attempt will be made within 24 hours. This note will not be viewable in [address]
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
## 2017-06-14 NOTE — PROGRESS NOTES
Pt here today for his 3 month FUP post SBRT at [facility] for mets liver lesions. Pt was recently discharged from the hospital for a small bowel obstruction and stated that he finally stopped having N/V and diarrhea about two days ago. Pt stated he is feeling weak and discouraged. Chemo will resume on 6/20/17 if pt is stable enough--pt is currently taking Cipro. Pt stated he is feeling fine as far as having RT is concerned. Pt has requested to be placed on a PRN status for RT FUP appts.  per NP

## 2017-06-26 NOTE — PROGRESS NOTES
Continuous chemotherapy CADD pump verified with Nicholas Pascual RN . Cassette contains Fluorouracil medication with 92 ml residual volume, infusing at 2.0 ml/hr for 46 hours. Pump in Lock Level 2. Reviewed pump education with patient and patient instructed to call [employer] with any pump issues. Verbal understanding obtained. Pump connected and patient scheduled for 06/28/17 at 1600 to return for pump removal.  Patient discharged via ambulation accompanied by wife. Instructed to notify physician of any problems, questions or concerns. Allowed opportunity for patient/family to ask questions. Verbalized understanding.

## 2017-07-10 NOTE — PROGRESS NOTES
Pt arrived ambulatory after OV for C54 D15 Avastin, C102, D1 folfiri, he denies new complaints. Pt tolerated infusion without incident, positive blood return before/during/after IVP. 5FU CIVI pump secured and set to run. Pt dc'd stable, to return to [facility] on 7/12 at 1700.

## 2017-07-24 NOTE — PROGRESS NOTES
Pt arrived ambulatory for c55 Avastin, and C103 folfiri, he denies new complaints. Pt tolerated infusion without incident. Positive blood return before/during/after IVP. 5FU CIVI pump secured and set to run. Pt dc'd stable, to return to [facility] on 7/26 at 1700.

## 2017-07-26 NOTE — PROGRESS NOTES
Patient arrived ambulatory. 5FU pump completed. Pump disconnected and port flushed and de-accessed. Patient aware of next scheduled appointment in outpatient infusion center on 8/7/17 at 11:15am. Discharged ambulatory.

## 2017-08-07 NOTE — PROGRESS NOTES
Pt. Arrived to [facility] for: avastin and folfiri which he tolerated well. Adrucil pump started   Any issues or concerns during this appointment: None  Pt. States that he is familiar with chemotherapy pump and has no questions. Patient aware of next appointment on: 8-9-17 @ 1700  Pt.  Discharged: ambulatory home

## 2017-08-21 NOTE — PROGRESS NOTES
Arrived to the [facility] ambulatory. chemo completed 5fu pump started over 46hours. Patient tolerated well. Any issues or concerns during appointment: no.  Patient aware of next infusion appointment on  8/23 at 1600. Discharged to home ambulatory.

## 2017-09-07 NOTE — PROGRESS NOTES
Arrived to the [facility]. Chemotherapy completed. Patient tolerated well. Fluorouracil pump infusing at discharged Seen in infusion by Becca Perry NP  Any issues or concerns during appointment: none. Patient aware of next infusion appointment on 09/09  at 1300 . Discharged ambulatory.

## 2017-09-09 NOTE — PROGRESS NOTES
Pt arrived ambulatory today at 1050, to have IV chemotherapy pump removed. Pt tolerated without difficulty. Patient discharged via ambulatory accompanied by spouse. Instructed to notify physician of any problems, questions or concerns. Allowed opportunity for patient/family to ask questions. Verbalized understanding. Next appointment is Sept 18 at 1000 with Neptali Luis.

## 2017-09-09 NOTE — PROGRESS NOTES
Problem: Knowledge Deficit  Goal: *Verbalizes understanding of procedures and medications  Outcome: Progressing Towards Goal  Verbalizes/demonstrates understanding of purpose/procedure/potential side effects of fluorouracil.

## 2017-10-09 PROBLEM — K72.00 ACUTE LIVER FAILURE: Status: ACTIVE | Noted: 2017-01-01

## 2017-10-09 PROBLEM — K59.09 OTHER CONSTIPATION: Status: ACTIVE | Noted: 2017-01-01

## 2017-10-09 PROBLEM — E80.6 HYPERBILIRUBINEMIA: Status: ACTIVE | Noted: 2017-01-01

## 2017-10-09 PROBLEM — K72.00 ACUTE LIVER FAILURE WITHOUT HEPATIC COMA: Status: ACTIVE | Noted: 2017-01-01
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
## 2017-10-09 NOTE — IP AVS SNAPSHOT
[address] 
[phone] Patient: Armando Velarde MRN: [id number] LCY:5/4/[id number] You are allergic to the following Allergen Reactions Oxaliplatin Shortness of Breath Rash Other (comments) Severe abdominal pain Recent Documentation Weight BMI Smoking Status 82.6 kg 27.7 kg/m2 Never Smoker Emergency Contacts Name Discharge Info Relation Home Work Mobile Tamme 62 CAREGIVER [3] Spouse [3] [id number] Austin Flores CAREGIVER [3] Brother [24] [phone] About your hospitalization You were admitted on:  October 9, 2017 You last received care in the:  [address] You were discharged on:  October 12, 2017 Unit phone number:  [phone] Why you were hospitalized Your primary diagnosis was:  Acute Liver Failure Without Hepatic Coma Your diagnoses also included:  Metastatic Colon Cancer To Liver (Hcc), Hyperbilirubinemia, Other Constipation, Acute Liver Failure Providers Seen During Your Hospitalizations Provider Role Specialty Primary office phone Karla Self MD Attending Provider Hematology and Oncology [phone] Your Primary Care Physician (PCP) Primary Care Physician Office Phone Office Fax [facility], [facility] [place] Follow-up Information Follow up With Details Comments Contact Info Debbi Medina MD   [facility] [address] 
[phone] Nito Beckham MD  [facility] [address] [facility] [address] 
[phone] Your Appointments Monday October 16, 2017 10:00 AM EDT Chemo with SS  
[address] [facility]) [address] [facility] [phone] [facility] [address] [phone] [address] Wednesday October 18, 2017  1:15 PM EDT Chemo with SS  
[address] [facility]) [address] Carley Higuera [phone] [facility] [address]  
[phone] [address] Current Discharge Medication List  
  
START taking these medications Dose & Instructions Dispensing Information Comments Morning Noon Evening Bedtime  
 levoFLOXacin 500 mg tablet Commonly known as:  Barb Pih Dose:  500 mg Take 1 Tab by mouth every twenty-four (24) hours. Quantity:  2 Tab Refills:  0  
     
  
   
   
   
  
 metroNIDAZOLE 500 mg tablet Commonly known as:  FLAGYL Dose:  500 mg Take 1 Tab by mouth every twelve (12) hours every twelve (12) hours. Quantity:  4 Tab Refills:  0 CONTINUE these medications which have NOT CHANGED Dose & Instructions Dispensing Information Comments Morning Noon Evening Bedtime  
 magnesium oxide 400 mg tablet Commonly known as:  MAG-OX Dose:  800 mg Take 800 mg by mouth two (2) times a day. Refills:  0  
     
  
   
   
  
   
  
 meloxicam 7.5 mg tablet Commonly known as:  MOBIC Dose:  7.5 mg Take 7.5 mg by mouth two (2) times a day. Refills:  0  
     
  
   
   
  
   
  
 multivitamin tablet Commonly known as:  ONE A DAY Dose:  1 Tab Take 1 Tab by mouth daily. Refills:  0  
     
  
   
   
   
  
 omeprazole 20 mg capsule Commonly known as:  PriLOSEC Dose:  20 mg Take 1 capsule by mouth daily. Quantity:  30 capsule Refills:  3  
     
  
   
   
   
  
 ondansetron hcl 8 mg tablet Commonly known as:  [place] Notes to Patient:  Take on as needed schedule Dose:  8 mg Take 1 Tab by mouth every eight (8) hours as needed for Nausea. Quantity:  90 Tab Refills:  1  
     
   
   
   
  
 pantoprazole 40 mg tablet Commonly known as:  PROTONIX Dose:  40 mg Take 1 Tab by mouth daily. Quantity:  30 Tab Refills:  2  
     
  
   
   
   
  
 prochlorperazine 10 mg tablet Commonly known as:  COMPAZINE Notes to Patient:  Take on as needed schedule Dose:  10 mg Take 1 Tab by mouth every six (6) hours as needed. Quantity:  90 Tab Refills:  1  
     
   
   
   
  
 pyridoxine (vitamin B6) 50 mg tablet Commonly known as:  VITAMIN B-6 Dose:  50 mg Take 1 Tab by mouth daily. Quantity:  100 Tab Refills:  3 STOOL SOFTENER PO Notes to Patient:  As needed Dose:  2 Tab Take 2 Tabs by mouth as needed. Refills:  0  
     
   
   
   
  
 traMADol 50 mg tablet Commonly known as:  ULTRAM  
Notes to Patient:  Take on as needed schedule Dose:  50 mg Take 50 mg by mouth every six (6) hours as needed for Pain. Refills:  0 STOP taking these medications   
 cefUROXime 500 mg tablet Commonly known as:  CEFTIN Where to Get Your Medications These medications were sent to [address] [place] [address] [address] [place] [address] AT [address] [place] [address] 1206 [facility], [address] [place] [id number] Phone:  [phone]  
  levoFLOXacin 500 mg tablet  
 metroNIDAZOLE 500 mg tablet Discharge Instructions DISCHARGE SUMMARY from Nurse The following personal items are in your possession at time of discharge: 
 
Dental Appliances: None Visual Aid: Glasses, With patient Home Medications: None Jewelry: With patient, Watch Clothing: With patient, Undergarments, Socks, Shirt, Pants, Footwear Other Valuables: At bedside, Cell Phone, Other (comment), Wallet () PATIENT INSTRUCTIONS: 
 
After general anesthesia or intravenous sedation, for 24 hours or while taking prescription Narcotics: · Limit your activities · Do not drive and operate hazardous machinery · Do not make important personal or business decisions · Do  not drink alcoholic beverages · If you have not urinated within 8 hours after discharge, please contact your surgeon on call. Report the following to your surgeon: 
· Excessive pain, swelling, redness or odor of or around the surgical area · Temperature over 100.5 · Nausea and vomiting lasting longer than 4 hours or if unable to take medications · Any signs of decreased circulation or nerve impairment to extremity: change in color, persistent  numbness, tingling, coldness or increase pain · Any questions What to do at Home: 
Recommended activity: Activity as tolerated, per MD instructions If you experience any of the following symptoms fever > 100.5, nausea, vomiting, pain, chest pain, shortness of breath please follow up with MD. 
 
 
*  Please give a list of your current medications to your Primary Care Provider. *  Please update this list whenever your medications are discontinued, doses are 
    changed, or new medications (including over-the-counter products) are added. *  Please carry medication information at all times in case of emergency situations. These are general instructions for a healthy lifestyle: No smoking/ No tobacco products/ Avoid exposure to second hand smoke Surgeon General's Warning:  Quitting smoking now greatly reduces serious risk to your health. Obesity, smoking, and sedentary lifestyle greatly increases your risk for illness A healthy diet, regular physical exercise & weight monitoring are important for maintaining a healthy lifestyle You may be retaining fluid if you have a history of heart failure or if you experience any of the following symptoms:  Weight gain of 3 pounds or more overnight or 5 pounds in a week, increased swelling in our hands or feet or shortness of breath while lying flat in bed. Please call your doctor as soon as you notice any of these symptoms; do not wait until your next office visit. Recognize signs and symptoms of STROKE: 
 
F-face looks uneven A-arms unable to move or move unevenly S-speech slurred or non-existent T-time-call 911 as soon as signs and symptoms begin-DO NOT go Back to bed or wait to see if you get better-TIME IS BRAIN. Warning Signs of HEART ATTACK Call 911 if you have these symptoms: 
? Chest discomfort. Most heart attacks involve discomfort in the center of the chest that lasts more than a few minutes, or that goes away and comes back. It can feel like uncomfortable pressure, squeezing, fullness, or pain. ? Discomfort in other areas of the upper body. Symptoms can include pain or discomfort in one or both arms, the back, neck, jaw, or stomach. ? Shortness of breath with or without chest discomfort. ? Other signs may include breaking out in a cold sweat, nausea, or lightheadedness. Don't wait more than five minutes to call [address]! Fast action can save your life. Calling 911 is almost always the fastest way to get lifesaving treatment. Emergency Medical Services staff can begin treatment when they arrive  up to an hour sooner than if someone gets to the hospital by car. The discharge information has been reviewed with the patient. The patient verbalized understanding. Discharge medications reviewed with the patient and appropriate educational materials and side effects teaching were provided. Learning About Fluid Overload What is fluid overload? Fluid overload means that your body has too much water. The extra fluid in your body can raise your blood pressure and force your heart to work harder. It can also make it hard for you to breathe. Most of your body is made up of water.  The body uses minerals like sodium and potassium to help organs such as your heart, kidneys, and liver balance how much water you need. For example, the heart pumps blood to move water around the body. And the kidneys work to get rid of the water that the body doesn't need. Health conditions like kidney disease, heart failure, and cirrhosis can cause fluid overload. Other things can cause extra fluid to build up. IV fluids, some medicines, too much salt (sodium) from food, and certain medical treatments can sometimes cause this fluid increase. What are the symptoms? Some of the most common symptoms are: 
· Gaining weight over a short period of time. · Swelling in the ankles or legs. · Shortness of breath. How is it treated? The goal of treatment is to remove the extra fluid in your body. Your treatment will depend on the cause. Your doctor may: · Give you medicines, such as diuretics (also called \"water pills\"). They help your body get rid of the extra fluid. · Restrict your fluid or salt intake. Follow-up care is a key part of your treatment and safety. Be sure to make and go to all appointments, and call your doctor if you are having problems. It's also a good idea to know your test results and keep a list of the medicines you take. Where can you learn more? Go to http://leni-sharmin.info/. Enter O110 in the search box to learn more about \"Learning About Fluid Overload. \" Current as of: January 12, 2017 Content Version: 11.3 © 2121-3558 [employer]. Care instructions adapted under license by [employer] (which disclaims liability or warranty for this information). If you have questions about a medical condition or this instruction, always ask your healthcare professional. [place] [address] any warranty or liability for your use of this information. Discharge Orders None ACO Transitions of Care Introducing [employer] [address] offers a voluntary care coordination program to provide high quality service and care to Lenny Banerjee fee-for-service beneficiaries. Yamel Vernon was designed to help you enhance your health and well-being through the following services: ? Transitions of Care  support for individuals who are transitioning from one care setting to another (example: Hospital to home). ? Chronic and Complex Care Coordination  support for individuals and caregivers of those with serious or chronic illnesses or with more than one chronic (ongoing) condition and those who take a number of different medications. If you meet specific medical criteria, a [address] [place] [address] Rd may call you directly to coordinate your care with your primary care physician and your other care providers. For questions about the [facility] programs, please, contact your physicians office. For general questions or additional information about Accountable Care Organizations: 
Please visit www.medicare.gov/acos. html or call 1-800-MEDICARE (2-429.485.2482) TTY users should call 5-407.533.3259. Introducing hospitals & HEALTH SERVICES! Dear Erica Farrar: Thank you for requesting a [employer] account. Our records indicate that you already have an active [employer] account. You can access your account anytime at [email or web address] Did you know that you can access your hospital and ER discharge instructions at any time in [employer]? You can also review all of your test results from your hospital stay or ER visit. Additional Information If you have questions, please visit the Frequently Asked Questions section of the [employer] website at [email or web address] Remember, [employer] is NOT to be used for urgent needs. For medical emergencies, dial 911. Now available from your iPhone and Android! General Information Please provide this summary of care documentation to your next provider. Patient Signature:  ____________________________________________________________ Date:  ____________________________________________________________  
  
Stephanie Moulds Provider Signature:  ____________________________________________________________ Date:  ____________________________________________________________

## 2017-10-09 NOTE — PROGRESS NOTES
Patient's  Record from [facility] in [facility] were faxed and on his hard chart.
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
## 2017-10-10 NOTE — PROGRESS NOTES
Pt  Seen/examined and history reviewed. He underwent ERCP on 9/28/17 with Dr. Dav Radford with findings of stenosis of the biliary tract at the hilum in the proximal common hepatic duct extending about 1.5cm. The obstruction was traversed. A 10 Fr 12cm long plastic straight plastic stent was placed in the area. On discharge from [facility] his bilirubin was in 6 range and has increased significantly suggesting possibility of stent migration vs obstruction. Will proceed with ERCP later today with Dr Tania Cross to assess biliary obstruction and possibility of drainage with stenting to improve bilirubin so he can receive chemo in the future. Patient's primary oncologist is Dr Luciana Hendricks who apparently is considering second=line chemo secondary to disease progression.     Will Follow    Sharyn Hdz MD  [facility]

## 2017-10-10 NOTE — ANESTHESIA POSTPROCEDURE EVALUATION
Post-Anesthesia Evaluation and Assessment    Patient: Harshad Hubbard MRN: [id number]  SSN: [id number]    YOB: 1950  Age: 75 y.o. Sex: male       Cardiovascular Function/Vital Signs  Visit Vitals    /82    Pulse 75    Temp 36.6 °C (97.8 °F)    Resp 18    Wt 80.3 kg (177 lb)    SpO2 94%    BMI 26.91 kg/m2       Patient is status post total IV anesthesia anesthesia for Procedure(s):  ENDOSCOPIC RETROGRADE CHOLANGIOPANCREATOGRAPHY  ENDOSCOPY WITH PROSTHESIS OR STENT REMOVAL  ENDOSCOPIC SPHINCTEROTOMY  ENDOSCOPIC BRUSHING  BILIARY STENT PLACEMENT. Nausea/Vomiting: None    Postoperative hydration reviewed and adequate. Pain:  Pain Scale 1: Visual (10/10/17 1622)  Pain Intensity 1: 0 (10/10/17 1622)   Managed    Neurological Status:   Neuro (WDL): Exceptions to WDL (10/10/17 1622)  Neuro  Neurologic State: Sleeping (10/10/17 1622)  LUE Motor Response: Spontaneous  (10/10/17 1622)  LLE Motor Response: Spontaneous  (10/10/17 1622)  RUE Motor Response: Spontaneous  (10/10/17 1622)  RLE Motor Response: Spontaneous  (10/10/17 1622)   At baseline    Mental Status and Level of Consciousness: Arousable    Pulmonary Status:   O2 Device: Nasal cannula (10/10/17 1622)   Adequate oxygenation and airway patent    Complications related to anesthesia: None    Post-anesthesia assessment completed.  No concerns    Signed By: Irma Cohen MD     October 10, 2017

## 2017-10-10 NOTE — PROCEDURES
ERCP: Endoscopic Retrograde Cholangiopancreatogram with biliary stent extraction, biliary sphincterotomy, brush cytology, and biliary stent insertion    DATE of PROCEDURE: 10/10/2017    INDICATION: jaundice    POSTPROCEDURE DIAGNOSIS: biliary stricture of common hepatic duct, possible acquired biliary cyst.    [facility]    INSTRUMENT: [id number], RX 39 sphincterotome, polypectomy snare, cytology brush, 10 FG x 10 cm flanged plastic biliary stent    PROCEDURE:  After obtaining informed consent, the patient was placed in prone position on the fluoroscopy table. The patient was then sedated. The endoscope was passed under indirect technique to the oropharynx and gently passed into the esophagus. The endoscope was passed to the ampulla. Only partial views of the stomach and duodenum were obtained. After the procedure, the patient was taken to the recovery area in stable condition. FINDINGS:  Ampulla: expected position. No evidence of prior sphincterotomy. A flanged plastic biliary biliary stent is in place with the end impacted into the contralateral duodenal wall. The stent was cannulated with a guidewire via the sphincterotome, and a cholangiogram was performed through the stent showing an apparent smooth stricture of the common hepatic duct. The proximal end of the stent appeared to be in the right intrahepatic ducts. The stent was then removed over the guidewire. Bile Duct: Cholangiogram after stent removal revealed a normal distal CBD and normal intrahepatics except for an approximately 1 cm space in the right liver that was proximal to the small biliary radicals and filled with contrast.  The left hepatic system was normal, and the right hepatic system was otherwise pruned, consistent with either prior resection or cirrhotic changes. The CHD appeared to show a smooth stricture measuring about 1.5 cm in length. A large biliary sphincterotomy was performed.   A brush cytology sample was obtained. A 10 FG x 10 cm flanged plastic biliary stent was placed with ease with the external flange flush with the duodenal mucosa. In this position, the proximal end of the stent appeared to be in the right hepatic duct. Pancreatic Duct: never entered. Estimated Blood Loss: 0 cc-min    ASSESSMENT/PLAN: apparent biliary stricture at the level of the CHD, consistent with recent report. The stent may have been malpositioned. The stricture appears more suggestive of extrinsic compression such as from metastatic lymphadenopathy than of intrinsic disease such as cholangiocarcinoma. The apparent cystic space in the liver is in continuity with the biliary system and may represent necrosis of a prior metastasis or injury from the prior stent. It is also possible that this represents a true choledochal cyst, although there are not multiple cysts to suggest Caroli's disease. The biliary stent should be exchanged in 3 months. The right and left hepatic ducts are not dilated, and it is not clear if it would be possible to place an expandable metal stent.     Elizabeth Ballard MD

## 2017-10-10 NOTE — PROGRESS NOTES
TRANSFER - IN REPORT:    Verbal report received from [facility] on [facility]  being received from [phone] for ordered procedure      Report consisted of patients Situation, Background, Assessment and   Recommendations(SBAR). Information from the following report(s) SBAR and Recent Results was reviewed with the receiving nurse. Opportunity for questions and clarification was provided. Assessment completed upon patients arrival to unit and care assumed.

## 2017-10-10 NOTE — PROGRESS NOTES
END OF SHIFT NOTE:    Intake/Output      Voiding: YES  Catheter: NO  Drain:              Stool:  0 occurrences. Emesis:  0 occurrences. VITAL SIGNS  Patient Vitals for the past 12 hrs:   Temp Pulse Resp BP SpO2   10/10/17 0358 98.4 °F (36.9 °C) 86 18 131/73 92 %   10/09/17 2257 99.7 °F (37.6 °C) 96 18 (!) 150/96 94 %   10/09/17 2001 98.9 °F (37.2 °C) 82 18 150/81 92 %       Pain Assessment  Pain 1  Pain Scale 1: Numeric (0 - 10) (10/09/17 1623)  Pain Intensity 1: 0 (10/09/17 1623)  Patient Stated Pain Goal: 0 (10/09/17 1623)    Ambulating  Yes    Additional Information:     Shift report given to oncoming nurse at the bedside.     Shavon Rodríguez RN

## 2017-10-10 NOTE — PERIOP NOTES
TRANSFER - OUT REPORT:    Verbal report given to Juan Flores RN (name) on Armando Velarde  being transferred to 52(unit) for routine post - op       Report consisted of patients Situation, Background, Assessment and   Recommendations(SBAR). Information from the following report(s) SBAR, OR Summary, Intake/Output and MAR was reviewed with the receiving nurse. Lines:   Venous Access Device Left Single Port  (Active)   Central Line Being Utilized Yes 10/10/2017  4:22 PM   Criteria for Appropriate Use Long term IV/antibiotic administration 10/10/2017  2:18 PM   Site Assessment Clean, dry, & intact 10/10/2017  4:22 PM   Date of Last Dressing Change 10/09/17 10/10/2017  9:30 AM   Dressing Status Clean, dry, & intact 10/10/2017  4:22 PM   Dressing Type Disk with Chlorhexadine gluconate (CHG) 10/10/2017  4:22 PM   Action Taken Blood drawn 10/10/2017  4:06 AM   Date Accessed (Medial Site) 10/09/17 10/10/2017  9:30 AM   Positive Blood Return (Medial Site) Yes 10/10/2017  4:22 PM   Action Taken (Medial Site) Infusing 10/10/2017  4:22 PM   Date Needle Changed (Medial Site) 10/09/17 10/10/2017  4:06 AM        Opportunity for questions and clarification was provided. Patient transported with:   O2 @ 2 liters    VTE prophylaxis orders have been written for Armando Velarde. Patient  given floor number and nurses name. Per GI staff, wife had to leave to  family at the airport. Attempted to call room without success.

## 2017-10-10 NOTE — PROGRESS NOTES
[facility] Hematology & Oncology        Inpatient Hematology / Oncology Progress Note      Admission Date: 10/9/2017  3:58 PM  Reason for Admission/Hospital Course: Biliary obstruction [K83.1]      24 Hour Events:  Afebrile, VSS  Bili down to 11.7  Pt concerned about taking lovenox as DVT prophylaxis. States he had GI bleed in past r/t excessive aspirin use - unrelated to anticoagulants. Discussed concerns and educated on importance of taking Lovenox to prevent blood clots. Pt agreeable to proceed with injections. ROS:  Constitutional: +fatigue; Negative for fever, chills. CV: Negative for chest pain, palpitations, edema. Respiratory: Negative for dyspnea, cough, wheezing. GI: +acid indigestion; negative for nausea, abdominal pain, diarrhea. 10 point review of systems is otherwise negative with the exception of the elements mentioned above in the HPI. Allergies   Allergen Reactions    Oxaliplatin Shortness of Breath, Rash and Other (comments)     Severe abdominal pain       OBJECTIVE:  Patient Vitals for the past 8 hrs:   BP Temp Pulse Resp SpO2   10/10/17 1147 152/79 97.9 °F (36.6 °C) 83 18 95 %   10/10/17 0844 153/85 98.2 °F (36.8 °C) 83 18 94 %     Temp (24hrs), Av.8 °F (36.6 °C), Min:93 °F (33.9 °C), Max:99.7 °F (37.6 °C)         Physical Exam:  Constitutional: Well developed, well nourished male in no acute distress, sitting comfortably in the hospital bed. HEENT: Normocephalic and atraumatic. Oropharynx is clear, mucous membranes are moist.   Extraocular muscles are intact. Sclerae icteric. Neck supple without JVD. No thyromegaly present. Lymph node   Deferred   Skin Warm and dry. No bruising and no rash noted. No erythema. No pallor. +Icterus   Respiratory Lungs are clear to auscultation bilaterally without wheezes, rales or rhonchi, normal air exchange without accessory muscle use. CVS Normal rate, regular rhythm and normal S1 and S2. No murmurs, gallops, or rubs. Abdomen Soft, nontender and nondistended, normoactive bowel sounds. No palpable mass. No hepatosplenomegaly. Neuro Grossly nonfocal with no obvious sensory or motor deficits. MSK Normal range of motion in general.  No edema and no tenderness. Psych Appropriate mood and affect. Labs:    Recent Labs      10/10/17   0405  10/09/17   1244   WBC  6.8  7.5   RBC  3.01*  3.16*   HGB  10.4*  11.0*   HCT  29.4*  30.7*   MCV  97.7  97.2   MCH  34.6*  34.8*   MCHC  35.4*  35.8*   RDW  18.8*  19.4*   PLT  168  157   GRANS  69  77   LYMPH  16  7*   MONOS  12  13*   EOS  3  2   BASOS  0  1   IG  0.1   --    DF  AUTOMATED  AUTOMATED   ANEU  4.7  5.8   ABL  1.1  0.5   ABM  0.8  1.0   IDNA  0.2  0.2   ABB  0.0  0.1   AIG  0.0   --       Recent Labs      10/10/17   0405  10/09/17   1241   NA  141  137   K  4.1  3.8   CL  108*  104   CO2  26  26   AGAP  7  7   GLU  90  135*   BUN  16  20   CREA  0.71*  0.80   GFRAA  >60  >60   GFRNA  >60  >60   CA  8.1*  8.5   SGOT  167*  187*   AP  768*  895*   TP  5.3*  5.9*   ALB  1.9*  2.3*   GLOB  3.4  3.6   AGRAT  0.6*  0.6   MG  1.8  2.2         Imaging:  US ABD LTD [[id number]] Collected: 10/10/17 0850      Order Status: Completed Updated: 10/10/17 0904     Narrative:       Abdominal ultrasound, 10/10/2017     History: Colon cancer with liver metastases. Hyperbilirubinemia.      Comparison:  None.      Findings: The liver is normal in size measuring 11.3 cm.  No intrahepatic  biliary ductal dilitation is seen.  Liver echogenicity is diffusely  heterogeneous. A right lobe lesion is seen measuring 1.8 cm x 2.2 cm x 1.7 cm  which is incompletely characterized by ultrasound although may represent one of  the patient's known liver metastases.  The main portal vein is patent and  demonstrates hepatopedal flow. A stent is partially visualized in the central  liver.     The gallbladder has been removed.  The common bile duct is obscured by bowel gas  and therefore cannot be assessed. The pancreas is obscured by bowel gas and therefore not well assessed. The right kidney measures 10.2 cm in length.  No shadowing stones, or  hydronephrosis is seen of the right kidney. The IVC is patent. The abdominal aorta is obscured by bowel gas and therefore  cannot be assessed. Incidental note is made of a right pleural effusion.       Impression:       IMPRESSION:   1.  Very limited study due to excessive bowel gas. No significant intrapelvic  biliary ductal dilation is seen. The common bile duct is obscured by bowel gas  and therefore cannot be assessed. A stent is partially visualized in the central  liver. 2.  Heterogeneous liver with at least one distinct lesion measuring 2.2 cm x 1.8  cm x 1.7 cm likely representing one of the patient's known hepatic metastases. .    3.  Right pleural effusion.                    ASSESSMENT:    Problem List  Date Reviewed: 10/9/2017          Codes Class Noted    Hyperbilirubinemia ICD-10-CM: E80.6  ICD-9-CM: 782.4  10/9/2017        * (Principal)Acute liver failure without hepatic coma ICD-10-CM: K72.00  ICD-9-CM: 634  10/9/2017        Other constipation ICD-10-CM: K59.09  ICD-9-CM: 564.09  10/9/2017        Acute liver failure ICD-10-CM: K72.00  ICD-9-CM: 085  10/9/2017        Small bowel obstruction ICD-10-CM: J98.179  ICD-9-CM: 560.9  6/5/2017        Dehydration ICD-10-CM: E86.0  ICD-9-CM: 276.51  6/2/2017        Malignant neoplasm of colon ([facility] [address] (Chronic) ICD-10-CM: C18.9  ICD-9-CM: 153.9  10/10/2016        Metastatic colon cancer to liver [facility]) (Chronic) ICD-10-CM: C18.9, C78.7  ICD-9-CM: 153.9, 197.7  10/10/2016        Hand foot syndrome ICD-10-CM: L27.1  ICD-9-CM: 693.0  7/6/2016        Dysgeusia ICD-10-CM: R43.2  ICD-9-CM: 781.1  6/10/2014        Fatigue ICD-10-CM: R53.83  ICD-9-CM: 780.79  10/29/2013        Urinary urgency ICD-10-CM: R39.15  ICD-9-CM: 788.63  10/26/2012    Overview Signed 10/26/2012  2:16 PM by Claus Quiñonez     10-26-12 start flomax                 Mr. Roldan Score is a 75 male patient with Stage IV colon cancer, most recently treated with FOLFIRI/Avastin. He was admitted for acute hepatic failure. PLAN:  Metastatic Colon Cancer  - Diagnosed in fall of 2012 status post chemotherapy, hepatic metastasectomy (Oct 2016), locoregional SBRT, most recently FOLFIRI/Avastin.  - CT 09/2017 shows progression in liver, retroperitoneal lymph nodes and right pleural effusion.      Acute Hepatic Failure  - Obtain records from [facility] regarding recent admission.  - Liver ultrasound to assess recent stent placement. - Consult GI.   10/10 Bili down to 11.7 today. Liver US study limited due to excessive gas in bowel. Stent partially visualized. One liver lesion noted, c/w known liver mets. Per GI, will undergo ERCP today to assess biliary obstruction and possibility of drainage with stenting to improve bilirubin.     Constipation  Continue stool softeners and add Lactulose TID. GERD  10/10 Still having acid indigestion on PPI. Will add H2.     Continue home meds  Светлана SOPs  Lovenox for DVT prophylaxis (Pt concerned about taking lovenox as DVT prophylaxis. States he had GI bleed in past r/t excessive aspirin use - unrelated to anticoagulants. Discussed concerns and educated on importance of taking Lovenox to prevent blood clots. Pt agreeable to proceed with injections.)              Reyes Speaker, PAYTON   [facility] Hematology & Oncology  [id number] [place] [address]  Office : [phone]  Fax : [phone]         Attending Addendum:  I personally evaluated the patient with Reyes Speaker N.P.,  and agree with the assessment, findings and plan as documented. Appears stable, heart regular without murmur, lungs clear, abdomen benign. Bili mildly improved today. GI planning for possible repeat ERCP.                Shakira Mendoza MD  [facility]  [address] [address]  Office : [phone]  Fax : [phone]

## 2017-10-10 NOTE — ANESTHESIA PREPROCEDURE EVALUATION
Anesthetic History               Review of Systems / Medical History  Patient summary reviewed, nursing notes reviewed and pertinent labs reviewed    Pulmonary        Sleep apnea: No treatment           Neuro/Psych         Psychiatric history (PTSD)     Cardiovascular    Hypertension              Exercise tolerance: <4 METS     GI/Hepatic/Renal     GERD: well controlled      PUD (Remote hx)    Comments: Colon CA with metastases Endo/Other        Cancer (Colon) and anemia     Other Findings   Comments: Persistent Diarrhea         Physical Exam    Airway  Mallampati: II  TM Distance: > 6 cm  Neck ROM: decreased range of motion   Mouth opening: Normal     Cardiovascular  Regular rate and rhythm,  S1 and S2 normal,  no murmur, click, rub, or gallop             Dental    Dentition: Poor dentition     Pulmonary      Decreased breath sounds: bilateral           Abdominal  GI exam deferred       Other Findings            Anesthetic Plan    ASA: 4  Anesthesia type: general            Anesthetic plan and risks discussed with: Patient

## 2017-10-10 NOTE — PROGRESS NOTES
TRANSFER - IN REPORT:    Verbal report received from  [address] [place] [address] / Su Casey RN  on Dea Morales  being received from  Endoscopy  for ordered procedure      Report consisted of patients Situation, Background, Assessment and   Recommendations(SBAR). Information from the following report(s) SBAR and Kardex was reviewed with the receiving nurse. Opportunity for questions and clarification was provided. Assessment completed upon patients arrival to unit and care assumed.

## 2017-10-10 NOTE — INTERVAL H&P NOTE
H&P Update:  Nathalie Apgar was seen and examined. History and physical has been reviewed. The patient has been examined. There have been no significant clinical changes since the completion of the originally dated History and Physical.  Patient identified by surgeon; surgical site was confirmed by patient and surgeon.     Signed By: Agueda Puga MD     October 10, 2017 2:26 PM
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
## 2017-10-10 NOTE — PROGRESS NOTES
Primary Nurse Tee Palmer RN and Ed INOCENCIO Smith performed a dual skin assessment on this patient No impairment noted  Lenny score is 20

## 2017-10-10 NOTE — PROGRESS NOTES
END OF SHIFT NOTE:    Intake/Output      Voiding: yes   Catheter: no   Drain:              Stool:  No  occurrences. Emesis:  No  occurrences. VITAL SIGNS  Patient Vitals for the past 12 hrs:   Temp Pulse Resp BP SpO2   10/09/17 2001 98.9 °F (37.2 °C) 82 18 150/81 92 %   10/09/17 1617 98.5 °F (36.9 °C) 96 18 145/84 98 %       Pain Assessment  Pain 1  Pain Scale 1: Numeric (0 - 10) (10/09/17 1623)  Pain Intensity 1: 0 (10/09/17 1623)  Patient Stated Pain Goal: 0 (10/09/17 1623)    Ambulating  Gripper sock placed     Additional Information:  Patient request to talk to doctor before receiving Lovenox. No complain of nausea or pain . Ultrasound of abdominal to be done in am.     Shift report given to oncoming nurse Shauna Zhong RN at the bedside.     Brigitte Airza RN

## 2017-10-10 NOTE — PROGRESS NOTES
L subclav port flushed with 10ml normal saline, positive blood return and flush w/o issue, LR connected to port and infusing. Patients underwear and watch put in belongings bag and returned to patients room. Pt requested RN to put cell phone and watch in top drawer of dresser in room.

## 2017-10-11 NOTE — PROGRESS NOTES
END OF SHIFT NOTE:    Intake/Output      Voiding: yes   Catheter: no   Drain:              Stool: yes see notes  occurrences. Emesis:  No  occurrences. VITAL SIGNS  Patient Vitals for the past 12 hrs:   Temp Pulse Resp BP SpO2   10/10/17 1757 97.5 °F (36.4 °C) 78 18 146/90 95 %   10/10/17 1745 97.3 °F (36.3 °C) 78 16 141/84 97 %   10/10/17 1712 97.7 °F (36.5 °C) 72 18 151/78 94 %   10/10/17 1707 - 73 16 163/74 94 %   10/10/17 1701 - 72 17 151/73 94 %   10/10/17 1656 - 73 18 156/80 94 %   10/10/17 1652 - 74 18 168/79 94 %   10/10/17 1647 - 74 18 160/85 95 %   10/10/17 1642 - 73 18 158/82 95 %   10/10/17 1637 - 75 18 162/82 94 %   10/10/17 1632 - 77 19 161/79 94 %   10/10/17 1629 - 79 20 166/79 95 %   10/10/17 1622 97.8 °F (36.6 °C) 85 22 164/78 95 %   10/10/17 1404 - 79 18 169/78 97 %   10/10/17 1147 97.9 °F (36.6 °C) 83 18 152/79 95 %   10/10/17 0844 98.2 °F (36.8 °C) 83 18 153/85 94 %       Pain Assessment  Pain 1  Pain Scale 1: Visual (10/10/17 1830)  Pain Intensity 1: 0 (10/10/17 1830)  Patient Stated Pain Goal: 3 (10/10/17 1404)  Pain Reassessment 1: Patient sleeping (10/10/17 1622)  Pain Location 1:  (epigastric) (10/10/17 1404)  Pain Description 1: Burning (10/10/17 1404)    Ambulating  Yes up with assistance     Additional Information:  Clear liquid  Tolerate well no complain of nausea and pain. Shift report given to oncoming nurse Fer Diaz RN at the bedside.     David Buckley RN

## 2017-10-11 NOTE — PROGRESS NOTES
END OF SHIFT NOTE:    Intake/Output      Voiding: YES  Catheter: NO  Drain:              Stool:  0 occurrences. Emesis:  0 occurrences. VITAL SIGNS  Patient Vitals for the past 12 hrs:   Temp Pulse Resp BP SpO2   10/11/17 1523 97.7 °F (36.5 °C) 78 16 162/87 95 %   10/11/17 1122 97.6 °F (36.4 °C) 73 18 136/75 92 %   10/11/17 0757 97.5 °F (36.4 °C) 71 18 147/76 95 %       Pain Assessment  Pain 1  Pain Scale 1: Numeric (0 - 10) (10/11/17 1611)  Pain Intensity 1: 0 (10/11/17 1611)  Patient Stated Pain Goal: 3 (10/11/17 0425)  Pain Reassessment 1: Yes (10/11/17 0906)  Pain Location 1: Abdomen (10/11/17 0817)  Pain Orientation 1: Mid (10/11/17 0817)  Pain Description 1: Aching (10/11/17 0817)  Pain Intervention(s) 1: Medication (see MAR) (10/11/17 0817)    Ambulating  Yes    Additional Information: Pt's pain under control with PRN medications. Pt had uneventful day. Shift report given to oncoming nurse at the bedside.     Glenys Hunt RN

## 2017-10-11 NOTE — PROGRESS NOTES
Problem: Mobility Impaired (Adult and Pediatric)  Goal: *Acute Goals and Plan of Care (Insert Text)  Discharge Goals:  (1.)Mr. Rodriguez will move from supine to sit and sit to supine with INDEPENDENT within 7 day(s). Goal met 10/11/17  (2.)Mr. Rodriguez will transfer from bed to chair and chair to bed with SUPERVISION using the least restrictive device within 7 day(s). Goal met 10/11/17  (3.)Mr. Rodriguez will ambulate with STAND BY ASSIST to SUPERVISION for 300+ feet with the least restrictive device within 7 day(s). Goal met 10/11/17  _____________________________________________________________________________________________      PHYSICAL THERAPY: INITIAL ASSESSMENT, DISCHARGE, TREATMENT DAY: DAY OF ASSESSMENT, PM 10/11/2017  INPATIENT: Hospital Day: 3  [place] / [place] / Product Type: Medicare /      NAME/AGE/GENDER: Dea Morales is a 75 y.o. male      PRIMARY DIAGNOSIS: Biliary obstruction [K83.1] Acute liver failure without hepatic coma Acute liver failure without hepatic coma  Procedure(s) (LRB):  ENDOSCOPIC RETROGRADE CHOLANGIOPANCREATOGRAPHY (N/A)  ENDOSCOPY WITH PROSTHESIS OR STENT REMOVAL (N/A)  ENDOSCOPIC SPHINCTEROTOMY (N/A)  ENDOSCOPIC BRUSHING (N/A)  BILIARY STENT PLACEMENT (N/A)  1 Day Post-Op  ICD-10: Treatment Diagnosis:       · Generalized Muscle Weakness (M62.81)   Precaution/Allergies:  Oxaliplatin       ASSESSMENT:      Mr. Orville Collins is supine in bed upon contact and agreeable to PT evaluation this afternoon. Pt reports 0/10 pain prior to activity. Pt reports living in 2 story home with all needs on 1 st floor with his wife. Pt reports independence with gait and ADLs at baseline, 0 falls, and drives. Pt states he is normally fairly active and was walking up to 2 miles just last week. Pt is independent with transition supine to sit EOB and supervision with sit to stand at bedside. Pt ambulated 350 ft in hallway while pushing IV pole with SBA-supervision.  Pt returned to room and pt returned to supine in bed independently. Pt states he does feel weaker than his baseline but does not feel like PT is necessary at this time. PT discussed pt getting up and walking in hallway with family or staff at least 3x daily. Pt in agreement. Will discharge from PT services at this time. This section established at most recent assessment   PROBLEM LIST (Impairments causing functional limitations):  1. Decreased Strength  2. Decreased [place] with Home Exercise Program    INTERVENTIONS PLANNED: (Benefits and precautions of physical therapy have been discussed with the patient.)  1. Family Education  2. Home Exercise Program (HEP)  3. Neuromuscular Re-education/Strengthening  4. Therapeutic Activites  5. Therapeutic Exercise/Strengthening      TREATMENT PLAN: Frequency/Duration: 1x visit  Rehabilitation Potential For Stated Goals: GOALS MET      RECOMMENDED REHABILITATION/EQUIPMENT: (at time of discharge pending progress): Due to the probability of continued deficits (see above) this patient will not likely need continued skilled physical therapy after discharge. Equipment:   · None at this time                   HISTORY:   History of Present Injury/Illness (Reason for Referral):  See H&P below  Patient is a 75 y.o. male with PMH of Stage IV colon cancer diagnosed in Fall 2012 by Dr. Autumn Valentin, liver mets, colon resection, LIVER segmentectomy who is seen in consultation at the request of Dr. Rina Raymundo for Acute Liver failure with painless jaundice. He was on vacation recently and ended up hospitalized with a TB of 8. He reports having multiple CT scans performed and placement of a stent in his biliary tract. He presented to his Oncology office today and had TB of 13.5, , , . INR is 1.1. He has been admitted for ZANDRA. Mr. Jayson Parson reports that about 3-4 weeks ago he started seeing dark colored urine. This concerned him.  He called his Oncology office to ask if he would be ok to go on vacation. He states that he was told if symptoms increased while on vacation to go to an ED. While on vacation, his wife noticed him looking very jaundiced. He reported to [facility] and was hospitalized from 9/25/17 until 10/4/17 for Jaundice. He underwent CT of A/P on 9/25/17 with findings of questionable mild wall thickening ascending colon versus underdistention, lobulated liver, mild intrahepatic biliary ductal dilatation with normal caliber CBD. MRCP was then performed with findings concerning for cholangiocarcinoma, 2 discrete solid gastric hepatic enlarged lymph nodes highly suspicious for neoplastic involvement. He underwent ERCP on 9/28/17 with Dr. Bryce Garcia with findings of stenosis of the biliary tract at the hilum in the proximal common hepatic duct extending about 1.5cm. The obstruction was traversed. A 10 Fr 12cm long plastic straight plastic stent was placed in the area. His total bilirubin had been 9.0 on admission at [facility] and had improved following ERCP with stent placement to 6.0. He states that his jaundice and dark colored urine never improved. He returned home on Oct. 5 and was seen by Dr. Gela Mcfarland office today for continue jaundice. Labs had worsened since discharge, according to patient. He reports having daily BM which are soft formed and brown since discharge from [facility]. While at [facility], he was having some constipation and straining along with gas pain. He denies any abdominal pain, nausea, or vomiting with onset of Jaundice. Mr. Fam Mason was followed during hospitalization in June 2017 for nausea, vomiting, diarrhea. He underwent Flex Sig on 6/9/17 with Dr. Trinity Dominguez which was normal. EGD was performed at the same time with findings of only a small HH. Symptoms at that time were thought to be either related to chemo or a viral GI illness which resolved on its own.    Past Medical History/Comorbidities:   Mr. Fam Mason  has a past medical history of Cancer ([facility] [address] (2012); Colon cancer ([facility] GERD (gastroesophageal reflux disease); History of anemia; Hypertension; Other ill-defined conditions(799.89); Psychiatric disorder; PTSD (post-traumatic stress disorder); PUD (peptic ulcer disease); Ulcer ([email or web address] (2008); and Unspecified sleep apnea. He also has no past medical history of Difficult intubation; Malignant hyperthermia due to anesthesia; Nausea & vomiting; or Pseudocholinesterase deficiency. Mr. Compa Blount  has a past surgical history that includes egd deliver thermal energy sphnctr/cardia gerd (2008); colonoscopy (N/A, 9/13/2016); vascular access; colonoscopy; heent (1980s); tonsillectomy; and flexible sigmoidoscopy (N/A, 6/9/2017). Social History/Living Environment:   Home Environment: Private residence  # Steps to Enter: 4  Rails to Enter: No  One/Two Story Residence: Two story, live on 1st floor  Living Alone: No  Support Systems: Spouse/Significant Other/Partner, Family member(s)  Patient Expects to be Discharged to[de-identified] Private residence  Current DME Used/Available at Home: None  Tub or Shower Type: Tub/Shower combination  Prior Level of Function/Work/Activity:  Lives with wife, indep with gait and ADLs, drives, 0 falls   Number of Personal Factors/Comorbidities that affect the Plan of Care: 3+: HIGH COMPLEXITY   EXAMINATION:   Most Recent Physical Functioning:   Gross Assessment:  AROM: Within functional limits  Strength: Generally decreased, functional  Coordination: Within functional limits               Posture:     Balance:  Sitting: Intact; Without support  Standing: Intact; Without support Bed Mobility:  Supine to Sit: Independent  Sit to Supine: Independent  Wheelchair Mobility:     Transfers:  Sit to Stand: Stand-by asssistance;Supervision  Stand to Sit: Stand-by asssistance;Supervision  Gait:     Speed/Brigitte: Slow  Step Length: Left shortened;Right shortened  Distance (ft): 350 Feet (ft)  Assistive Device:  (pushing IV pole)  Ambulation - Level of Assistance: Stand-by asssistance;Supervision  Interventions: Safety awareness training;Verbal cues       Body Structures Involved:  1. Heart  2. Lungs  3. Bones  4. Muscles Body Functions Affected:  1. Cardio  2. Respiratory  3. Neuromusculoskeletal  4. Movement Related Activities and Participation Affected:  1. General Tasks and Demands  2. Mobility  3. Self Care  4. Domestic Life  5. Interpersonal Interactions and Relationships  6. Community, Social and [employer] [place]   Number of elements that affect the Plan of Care: 4+: HIGH COMPLEXITY   CLINICAL PRESENTATION:   Presentation: Evolving clinical presentation with changing clinical characteristics: MODERATE COMPLEXITY   CLINICAL DECISION MAKIN [facility] Mobility Inpatient Short Form  How much difficulty does the patient currently have. .. Unable A Lot A Little None   1. Turning over in bed (including adjusting bedclothes, sheets and blankets)? [ ] 1   [ ] 2   [ ] 3   [X] 4   2. Sitting down on and standing up from a chair with arms ( e.g., wheelchair, bedside commode, etc.)   [ ] 1   [ ] 2   [ ] 3   [X] 4   3. Moving from lying on back to sitting on the side of the bed? [ ] 1   [ ] 2   [ ] 3   [X] 4   How much help from another person does the patient currently need. .. Total A Lot A Little None   4. Moving to and from a bed to a chair (including a wheelchair)? [ ] 1   [ ] 2   [ ] 3   [X] 4   5. Need to walk in hospital room? [ ] 1   [ ] 2   [ ] 3   [X] 4   6. Climbing 3-5 steps with a railing? [ ] 1   [ ] 2   [ ] 3   [X] 4   © , Trustees of [address] [place] [id number], under license to [employer]. All rights reserved    Score:  Initial: 24 Most Recent: X (Date: -- )     Interpretation of Tool:  Represents activities that are increasingly more difficult (i.e. Bed mobility, Transfers, Gait).        Score 24 23 22-20 19-15 14-10 9-7 6       Modifier CH CI CJ CK CL CM CN         · Mobility - Walking and Moving Around:               - CURRENT STATUS:    CH - 0% impaired, limited or restricted               - GOAL STATUS:           CH - 0% impaired, limited or restricted               - D/C STATUS:                       CH - 0% impaired, limited or restricted  [place] / [place] / Product Type: Medicare /           Use of outcome tool(s) and clinical judgement create a POC that gives a: Clear prediction of patient's progress: LOW COMPLEXITY                 TREATMENT:   (In addition to Assessment/Re-Assessment sessions the following treatments were rendered)   Pre-treatment Symptoms/Complaints:  None  Pain: Initial:   Pain Intensity 1: 0  Post Session:  0/10      Assessment/Reassessment only, no treatment provided today     Braces/Orthotics/Lines/Etc:   · IV  · O2 Device: Room air  Treatment/Session Assessment:    · Response to Treatment:  Pt ambulated 350 ft with SBA-supervision.   · Interdisciplinary Collaboration:  · Physical Therapist  · Registered Nurse  · After treatment position/precautions:  · Supine in bed  · Bed/Chair-wheels locked  · Bed in low position  · Call light within reach  · RN notified  Total Treatment Duration:  PT Patient Time In/Time Out  Time In: 1330  Time Out: [address]

## 2017-10-11 NOTE — PROGRESS NOTES
[facility]        Inpatient Hematology / Oncology Progress Note      Admission Date: 10/9/2017  3:58 PM  Reason for Admission/Hospital Course: Biliary obstruction [K83.1]      24 Hour Events:  Afebrile, VSS  Bili down to 10.0  ERCP yesterday      ROS:  Constitutional: +fatigue; Negative for fever, chills. CV: Negative for chest pain, palpitations, edema. Respiratory: Negative for dyspnea, cough, wheezing. GI: Negative for nausea, abdominal pain, diarrhea. 10 point review of systems is otherwise negative with the exception of the elements mentioned above in the HPI. Allergies   Allergen Reactions    Oxaliplatin Shortness of Breath, Rash and Other (comments)     Severe abdominal pain       OBJECTIVE:  Patient Vitals for the past 8 hrs:   BP Temp Pulse Resp SpO2 Weight   10/11/17 0757 147/76 97.5 °F (36.4 °C) 71 18 95 % -   10/11/17 0425 - - - - - 177 lb 0.5 oz (80.3 kg)   10/11/17 0411 123/71 97.5 °F (36.4 °C) 67 18 94 % -     Temp (24hrs), Av.7 °F (36.5 °C), Min:97.3 °F (36.3 °C), Max:98.2 °F (36.8 °C)    10/11 0701 - 10/11 1900  In: 247 [P.O.:247]  Out: -     Physical Exam:  Constitutional: Well developed, well nourished male in no acute distress, sitting comfortably in the hospital bed. HEENT: Normocephalic and atraumatic. Oropharynx is clear, mucous membranes are moist.   Extraocular muscles are intact. Sclerae icteric. Neck supple without JVD. No thyromegaly present. Lymph node   Deferred   Skin Warm and dry. No bruising and no rash noted. No erythema. No pallor. +Icterus   Respiratory Lungs are clear to auscultation bilaterally without wheezes, rales or rhonchi, normal air exchange without accessory muscle use. CVS Normal rate, regular rhythm and normal S1 and S2. No murmurs, gallops, or rubs. Abdomen Soft, nontender and nondistended, normoactive bowel sounds. No palpable mass. No hepatosplenomegaly.    Neuro Grossly nonfocal with no obvious sensory or motor deficits. MSK Normal range of motion in general.  No edema and no tenderness. Psych Appropriate mood and affect. Labs:      Recent Labs      10/10/17   0405  10/09/17   1244   WBC  6.8  7.5   RBC  3.01*  3.16*   HGB  10.4*  11.0*   HCT  29.4*  30.7*   MCV  97.7  97.2   MCH  34.6*  34.8*   MCHC  35.4*  35.8*   RDW  18.8*  19.4*   PLT  168  157   GRANS  69  77   LYMPH  16  7*   MONOS  12  13*   EOS  3  2   BASOS  0  1   IG  0.1   --    DF  AUTOMATED  AUTOMATED   ANEU  4.7  5.8   ABL  1.1  0.5   ABM  0.8  1.0   DINA  0.2  0.2   ABB  0.0  0.1   AIG  0.0   --         Recent Labs      10/11/17   0437  10/10/17   0405  10/09/17   1241   NA  140  141  137   K  4.7  4.1  3.8   CL  107  108*  104   CO2  25  26  26   AGAP  8  7  7   GLU  151*  90  135*   BUN  15  16  20   CREA  0.66*  0.71*  0.80   GFRAA  >60  >60  >60   GFRNA  >60  >60  >60   CA  8.5  8.1*  8.5   SGOT  113*  167*  187*   AP  745*  768*  895*   TP  5.5*  5.3*  5.9*   ALB  1.9*  1.9*  2.3*   GLOB  3.6*  3.4  3.6   AGRAT  0.5*  0.6*  0.6   MG  2.1  1.8  2.2         Imaging:  US ABD LTD [[id number]] Collected: 10/10/17 0850      Order Status: Completed Updated: 10/10/17 0904     Narrative:       Abdominal ultrasound, 10/10/2017     History: Colon cancer with liver metastases. Hyperbilirubinemia.      Comparison:  None.      Findings: The liver is normal in size measuring 11.3 cm.  No intrahepatic  biliary ductal dilitation is seen.  Liver echogenicity is diffusely  heterogeneous. A right lobe lesion is seen measuring 1.8 cm x 2.2 cm x 1.7 cm  which is incompletely characterized by ultrasound although may represent one of  the patient's known liver metastases.  The main portal vein is patent and  demonstrates hepatopedal flow. A stent is partially visualized in the central  liver. The gallbladder has been removed.  The common bile duct is obscured by bowel gas  and therefore cannot be assessed.     The pancreas is obscured by bowel gas and therefore not well assessed. The right kidney measures 10.2 cm in length.  No shadowing stones, or  hydronephrosis is seen of the right kidney. The IVC is patent. The abdominal aorta is obscured by bowel gas and therefore  cannot be assessed. Incidental note is made of a right pleural effusion.       Impression:       IMPRESSION:   1.  Very limited study due to excessive bowel gas. No significant intrapelvic  biliary ductal dilation is seen. The common bile duct is obscured by bowel gas  and therefore cannot be assessed. A stent is partially visualized in the central  liver. 2.  Heterogeneous liver with at least one distinct lesion measuring 2.2 cm x 1.8  cm x 1.7 cm likely representing one of the patient's known hepatic metastases. .    3.  Right pleural effusion.                    ASSESSMENT:    Problem List  Date Reviewed: 10/10/2017          Codes Class Noted    Hyperbilirubinemia ICD-10-CM: E80.6  ICD-9-CM: 782.4  10/9/2017        * (Principal)Acute liver failure without hepatic coma ICD-10-CM: K72.00  ICD-9-CM: 332  10/9/2017        Other constipation ICD-10-CM: K59.09  ICD-9-CM: 564.09  10/9/2017        Acute liver failure ICD-10-CM: K72.00  ICD-9-CM: 524  10/9/2017        Small bowel obstruction ICD-10-CM: L00.815  ICD-9-CM: 560.9  6/5/2017        Dehydration ICD-10-CM: E86.0  ICD-9-CM: 276.51  6/2/2017        Malignant neoplasm of colon ([facility] [address] (Chronic) ICD-10-CM: C18.9  ICD-9-CM: 153.9  10/10/2016        Metastatic colon cancer to liver [facility]) (Chronic) ICD-10-CM: C18.9, C78.7  ICD-9-CM: 153.9, 197.7  10/10/2016        Hand foot syndrome ICD-10-CM: L27.1  ICD-9-CM: 693.0  7/6/2016        Dysgeusia ICD-10-CM: R43.2  ICD-9-CM: 781.1  6/10/2014        Fatigue ICD-10-CM: R53.83  ICD-9-CM: 780.79  10/29/2013        Urinary urgency ICD-10-CM: R39.15  ICD-9-CM: 966.61  10/26/2012    Overview Signed 10/26/2012  2:16 PM by Larissa Torres     10-26-12 start flomax                 Mr. Orville Collins is a 75 male patient with Stage IV colon cancer, most recently treated with FOLFIRI/Avastin. He was admitted for acute hepatic failure. PLAN:  Metastatic Colon Cancer  - Diagnosed in fall of 2012 status post chemotherapy, hepatic metastasectomy (Oct 2016), locoregional SBRT, most recently FOLFIRI/Avastin.  - CT 09/2017 shows progression in liver, retroperitoneal lymph nodes and right pleural effusion.      Acute Hepatic Failure  - Obtain records from [facility] regarding recent admission.  - Liver ultrasound to assess recent stent placement. - Consult GI.   10/10 Bili down to 11.7 today. Liver US study limited due to excessive gas in bowel. Stent partially visualized. One liver lesion noted, c/w known liver mets. Per GI, will undergo ERCP today to assess biliary obstruction and possibility of drainage with stenting to improve bilirubin. 10/11 GI performed ERCP yesterday which revealed biliary stricture and possible malpositioned stent. GI recommends stent exchange in 3 mos. Bili down to 10.0     Constipation  Continue stool softeners and add Lactulose TID. GERD  10/10 Still having acid indigestion on PPI. Will add H2.     Continue home meds  Светлана SOPs  Lovenox for DVT prophylaxis (Pt concerned about taking lovenox as DVT prophylaxis. States he had GI bleed in past r/t excessive aspirin use - unrelated to anticoagulants. Discussed concerns and educated on importance of taking Lovenox to prevent blood clots. Pt agreeable to proceed with injections.)  Consult PT              Mark Noland, [facility]  [address] [address]  Office : [phone]  Fax : [phone]     Attending Addendum:  I personally evaluated the patient with Mark Noland N.P.,  and agree with the assessment, findings and plan as documented. Appears stable, heart regular without murmur, lungs clear, abdomen benign.   S/p ERCP w stent change and biopsy of biliary stricture: T bili decreasing, likely diacharge later this week.                Manan Ziegler MD  [facility]  [address] [address]  Office : [phone]  Fax : [phone]

## 2017-10-11 NOTE — PROGRESS NOTES
GI DAILY PROGRESS NOTE    Admit Date:  10/9/2017    Today's Date:  10/11/2017    CC:  Metastatic colon cancer with biliary obstruction    Subjective:     Patient s/p ERCP and stent exchange yesterday by Dr Ree Beltrán with slight improvement in levels today - expect LFTs to improve significantly tomorrow as procedure was done last yesterday. Patient denies any sig abd pain, nausea, vomiting, no signs of bleeding    ERCP 10/10:FINDINGS:  Ampulla: expected position. No evidence of prior sphincterotomy. A flanged plastic biliary biliary stent is in place with the end impacted into the contralateral duodenal wall. The stent was cannulated with a guidewire via the sphincterotome, and a cholangiogram was performed through the stent showing an apparent smooth stricture of the common hepatic duct. The proximal end of the stent appeared to be in the right intrahepatic ducts. The stent was then removed over the guidewire.     Bile Duct: Cholangiogram after stent removal revealed a normal distal CBD and normal intrahepatics except for an approximately 1 cm space in the right liver that was proximal to the small biliary radicals and filled with contrast.  The left hepatic system was normal, and the right hepatic system was otherwise pruned, consistent with either prior resection or cirrhotic changes. The CHD appeared to show a smooth stricture measuring about 1.5 cm in length. A large biliary sphincterotomy was performed. A brush cytology sample was obtained. A 10 FG x 10 cm flanged plastic biliary stent was placed with ease with the external flange flush with the duodenal mucosa. In this position, the proximal end of the stent appeared to be in the right hepatic duct. Assessment: Apparent biliary stricture at the level of the CHD, consistent with recent report. The stent may have been malpositioned.   The stricture appears more suggestive of extrinsic compression such as from metastatic lymphadenopathy than of intrinsic disease such as cholangiocarcinoma. The apparent cystic space in the liver is in continuity with the biliary system and may represent necrosis of a prior metastasis or injury from the prior stent. It is also possible that this represents a true choledochal cyst, although there are not multiple cysts to suggest Caroli's disease.     The biliary stent should be exchanged in 3 months. The right and left hepatic ducts are not dilated, and it is not clear if it would be possible to place an expandable metal stent.          History of Present Illness:  Patient is a 75 y.o. male with PMH of Stage IV colon cancer diagnosed in Fall 2012 by Dr. Tomeka Leone, liver mets, colon resection, LIVER segmentectomy who is seen in consultation at the request of Dr. Yuri Chou for Acute Liver failure with painless jaundice.       He was on vacation recently and ended up hospitalized with a TB of 8. He reports having multiple CT scans performed and placement of a stent in his biliary tract. He presented to his Oncology office today and had TB of 13.5, , , . INR is 1.1. He has been admitted for ZANDRA.      Mr. Rodriguez reports that about 3-4 weeks ago he started seeing dark colored urine. This concerned him. He called his Oncology office to ask if he would be ok to go on vacation. He states that he was told if symptoms increased while on vacation to go to an ED. While on vacation, his wife noticed him looking very jaundiced. He reported to [facility] and was hospitalized from 9/25/17 until 10/4/17 for Jaundice. He underwent CT of A/P on 9/25/17 with findings of questionable mild wall thickening ascending colon versus underdistention, lobulated liver, mild intrahepatic biliary ductal dilatation with normal caliber CBD. MRCP was then performed with findings concerning for cholangiocarcinoma, 2 discrete solid gastric hepatic enlarged lymph nodes highly suspicious for neoplastic involvement.  He underwent ERCP on 9/28/17 with Dr. Mary Moreland with findings of stenosis of the biliary tract at the hilum in the proximal common hepatic duct extending about 1.5cm. The obstruction was traversed. A 10 Fr 12cm long plastic straight plastic stent was placed in the area. His total bilirubin had been 9.0 on admission at [facility] and had improved following ERCP with stent placement to 6.0. He states that his jaundice and dark colored urine never improved. He returned home on Oct. 5 and was seen by Dr. Jake Cruz office today for continue jaundice. Labs had worsened since discharge, according to patient.      He reports having daily BM which are soft formed and brown since discharge from [facility]. While at [facility], he was having some constipation and straining along with gas pain. He denies any abdominal pain, nausea, or vomiting with onset of Jaundice.      Mr. Rodriguez was followed during hospitalization in June 2017 for nausea, vomiting, diarrhea. He underwent Flex Sig on 6/9/17 with Dr. Valerie Contreras which was normal. EGD was performed at the same time with findings of only a small HH.  Symptoms at that time were thought to be either related to chemo or a viral GI illness which resolved on its own.      Medications:   Current Facility-Administered Medications   Medication Dose Route Frequency    famotidine (PEPCID) tablet 20 mg  20 mg Oral BID    docusate sodium (COLACE) capsule 200 mg  200 mg Oral DAILY    magnesium oxide (MAG-OX) tablet 800 mg  800 mg Oral BID    ondansetron (ZOFRAN ODT) tablet 8 mg  8 mg Oral Q8H PRN    pantoprazole (PROTONIX) tablet 40 mg  40 mg Oral ACB    prochlorperazine (COMPAZINE) tablet 10 mg  10 mg Oral Q6H PRN    lactulose (CHRONULAC) solution 30 g  30 g Oral TID    levoFLOXacin (LEVAQUIN) tablet 500 mg  500 mg Oral Q24H    metroNIDAZOLE (FLAGYL) tablet 500 mg  500 mg Oral Q12H    0.9% sodium chloride infusion  75 mL/hr IntraVENous CONTINUOUS    enoxaparin (LOVENOX) injection 40 mg  40 mg SubCUTAneous Q24H    traMADol (ULTRAM) tablet 50 mg  50 mg Oral Q6H PRN    morphine injection 2 mg  2 mg IntraVENous Q3H PRN    acetaminophen (TYLENOL) tablet 650 mg  650 mg Oral Q6H PRN     Facility-Administered Medications Ordered in Other Encounters   Medication Dose Route Frequency    saline peripheral flush soln 10 mL  10 mL InterCATHeter PRN       Review of Systems:  ROS was obtained, with pertinent positives as listed above. No chest pain or SOB. Diet:  reg    Objective:   Vitals:  Visit Vitals    /75 (BP 1 Location: Left arm, BP Patient Position: At rest)    Pulse 73    Temp 97.6 °F (36.4 °C)    Resp 18    Wt 80.3 kg (177 lb 0.5 oz)    SpO2 92%    BMI 26.92 kg/m2     Intake/Output:  10/11 0701 - 10/11 1900  In: 247 [P.O.:247]  Out: -   10/09 1901 - 10/11 0700  In: 2617 [P.O.:180; I.V.:2437]  Out: 1325 [Urine:1325]  Exam:  General appearance: alert, cooperative, no distress  Lungs: clear to auscultation bilaterally anteriorly  Heart: regular rate and rhythm  Abdomen: soft, non-tender.  Bowel sounds normal. No masses, no organomegaly  Extremities: extremities normal, atraumatic, no cyanosis or edema  Neuro:  alert and oriented    Data Review (Labs):    Recent Labs      10/11/17   0437  10/10/17   0405  10/09/17   1655  10/09/17   1244  10/09/17   1241   WBC   --   6.8   --   7.5   --    HGB   --   10.4*   --   11.0*   --    HCT   --   29.4*   --   30.7*   --    PLT   --   168   --   157   --    MCV   --   97.7   --   97.2   --    NA  140  141   --    --   137   K  4.7  4.1   --    --   3.8   CL  107  108*   --    --   104   CO2  25  26   --    --   26   BUN  15  16   --    --   20   CREA  0.66*  0.71*   --    --   0.80   CA  8.5  8.1*   --    --   8.5   MG  2.1  1.8   --    --   2.2   GLU  151*  90   --    --   135*   AP  745*  768*   --    --   895*   SGOT  113*  167*   --    --   187*   ALB  1.9*  1.9*   --    --   2.3*   TP  5.5*  5.3*   --    --   5.9*   PTP   --   13.0*  12.0   --    --    INR   --   1.2  1.1   --    -- APTT   --    --   48.3*   --    --      CT of A/P 9/20/17:ABDOMEN CT:  Linear densities superior right hepatic dome is stable and not felt to represent  arterial focus. No early arterial enhancing process identified. Partially  calcified lesion in segment 8 image 114 similar to previous exam. Heterogeneous  areas of enhancement along the anterior left hepatic lobe are stable in  appearance. Portal veins patent. Medial right hepatic lobe lesion abutting the  intrahepatic IVC measures 2.2 cm series 2 image 20, previously 1.6 cm. Lateral  right hepatic lobe lesion measures 1.9 cm, previously 1.6 cm image 119. No new  liver focus. Subcapsular liver lesion measuring 3.3 cm image 15.       Assessment:     Principal Problem:    Acute liver failure without hepatic coma (10/9/2017)    Active Problems:    Metastatic colon cancer to liver ([address] (10/10/2016)      Hyperbilirubinemia (10/9/2017)      Other constipation (10/9/2017)      Acute liver failure (10/9/2017)    Patient is a 75 y.o. male with PMH of Stage IV colon cancer diagnosed in Fall 2012 by Dr. Srinivasan Jay, liver mets, colon resection, LIVER segmentectomy who is seen in consultation at the request of Dr. Goergia Holman for Acute Liver failure with painless jaundice. He was admitted to [facility] in [facility] from 9/25-10/4/17 and underwent ERCP with plastic stent placement. Total Bilirubin was improving at time of discharge. However, Total Bilirubin has now worsened. Concern for biliary obstruction. No evidence of acute liver failure with normal INR and absence of Hepatic Encephalopathy. Ammonia <10. Here found to have malpositioned stent abutting the duodenal wall which was exchanged and a large biliary sphincterotomy performed.   ADditionally brushings taken from CHD stricture which maybe negative as it appears to be sec to extrinsic compression       Plan:     F/up brushings - this is not likely additional cholangio but simply liver mets  F/up LFTs  Rest of plan per oncology - please call us back if we can be of assistance. If this stent does not help bilirubin it may not be able to be improved with stenting at all unfortunately as there was not significant intrahepatic dilation noted.     Orly Appiah MD  [facility], [place]

## 2017-10-12 NOTE — PROGRESS NOTES
END OF SHIFT NOTE:    Jaundiced, intermittent confusion noted. Intake/Output      Voiding: YES  Catheter: NO  Drain:              Stool:  1   Stool Assessment  Stool Color: Brown (10/11/17 2009)  Stool Appearance: Loose (10/11/17 2009)  Stool Amount: Medium (10/11/17 2009)  Stool Source/Status: Rectum (10/11/17 2009)    Emesis: 0       VITAL SIGNS  Patient Vitals for the past 12 hrs:   Temp Pulse Resp BP SpO2   10/12/17 0407 97.5 °F (36.4 °C) 80 16 119/60 93 %   10/12/17 0116 97.5 °F (36.4 °C) 79 16 131/69 93 %   10/11/17 1944 98.2 °F (36.8 °C) 72 16 142/74 94 %       Pain Assessment  Pain 1  Pain Scale 1: Numeric (0 - 10) (10/12/17 0229)  Pain Intensity 1: 1 (10/12/17 0229)  Patient Stated Pain Goal: 3 (10/12/17 0229)  Pain Reassessment 1: Yes (10/12/17 0229)  Pain Onset 1: pta (10/11/17 1926)  Pain Location 1: Abdomen (10/11/17 1926)  Pain Orientation 1: Mid (10/11/17 1926)  Pain Description 1: Pressure (bloated/feeling of fullness) (10/11/17 1926)  Pain Intervention(s) 1: Declines (will call for pain meds when ready) (10/11/17 1926)    Ambulating  Yes    Additional Information:     Shift report given to oncoming nurse at the bedside.     Marcia Christian

## 2017-10-12 NOTE — DISCHARGE SUMMARY
[facility]: Inpatient Hematology / Oncology Discharge Summary Note    Patient ID:  Elvira Pugh  [id number]  24 y.o.  1950    Admit Date: 10/9/2017    Discharge Date: 10/12/2017    Admission Diagnoses: Biliary obstruction [K83.1]    Discharge Diagnoses:  Principal Diagnosis: Acute liver failure without hepatic coma  Principal Problem:    Acute liver failure without hepatic coma (10/9/2017)    Active Problems:    Metastatic colon cancer to liver ([address] (10/10/2016)      Hyperbilirubinemia (10/9/2017)      Other constipation (10/9/2017)      Acute liver failure (10/9/2017)        Hospital Course:  Mr. Jake Gordon is a 75 y.o. male admitted on 10/09/17 with a primary diagnosis of acute liver failure. He has a history of stage 4 colon cancer diagnosed in the fall of 2012, started on palliative FOLFOX/Avastin, completed 6 cycles before he had an allergic reaction to oxaliplatin.  Changed to FOLFIRI/Avastin with stable to partial response of disease, normalization of CEA. Mars Connolly started to barely creep up in the summer of 2016, and CT in July 2016 reviewed - consistent with progression in hepatic metastases.   We recommended continuing FOLFIRI/Avastin temporarily but we presented his case at tumor board to discuss local therapy to the liver metastases.  Dr. Army Alvarado recommended referral to discuss surgical management of liver metastases.  Chemotherapy and Avastin were held in the dedra-operative period.  He underwent hepatic metastatectomy and sigmoid colectomy on October 10th, 2016, with multiple areas of both treated and active disease resected.  He was quite deconditioned after surgery, leading us to hold on resuming therapy for a time.  Restaging in January showed postsurgical changes of the liver and sigmoid colon as expected, but also showed new hepatic lesions concerning for progression of hepatic metastatic disease.  After multidisciplinary review, it was recommended that he have locoregional therapy with SBRT before returning to restart systemic therapy.  We then started chemo with FOLFIRI/Avastin back on 3/20/17. Aida Snare well until 06/05/17 when he was admitted on for SBO, he improved with conservative treatment.  EGD and flex sig were essentially normal.  He restarted treatment at a reduced dose of FOLFIRI and continued until recently when he presented to the office complaining of increased fatigue. CT for restaging was performed which showed progression in liver, retroperitoneal lymph nodes and right pleural effusion. He was to go on vacation as planned and return to discuss further treatment decisions. While on vacation, he ended up hospitalized with a bilirubin of 8. He states he had \"multiple CT's performed\" while admitted and placement of a stent in his biliary tract. When discharge to home week before last, he states his bilirubin was around 5 or 6 . He presented to the office on the day of admission and bilirubin was 13.5, , , and alk phos 895. He was admitted for acute liver failure. GI was consulted and performed ERCP which showed biliary stricture from possible compression from metastatic adenopathy as well as a malpositioned stent. His bilirubin has trended down, down to 5.6 today from 10.0 yesterday. GI recommends stent be exchanged in 3 months. He is feeling well and is ready for discharge home. He is scheduled to f/u with Dr. Jacquelyn Barrera in 1 week. He will be sent home with prescription of LVQ and Flagyl to complete 5 days worth of therapy. Advised to call with fever/chills, abdominal pain, or with any other concerns. Pt verbalizes understanding.       Consults:  IP CONSULT TO GASTROENTEROLOGY    Pertinent Diagnostic Studies:   Labs:    Recent Labs      10/12/17   0448  10/10/17   0405   WBC  9.9  6.8   HGB  10.3*  10.4*   PLT  208  168   ANEU  8.0  4.7    Recent Labs      10/12/17   0448  10/11/17   0437  10/10/17   0405   NA  142  140  141   K  4.0  4.7  4.1   CL  110*  107  108* CO2  26  25  26   GLU  130*  151*  90   BUN  19  15  16   CREA  0.66*  0.66*  0.71*   CA  8.1*  8.5  8.1*   SGOT  141*  113*  167*   AP  767*  745*  768*   TP  5.0*  5.5*  5.3*   ALB  1.7*  1.9*  1.9*   MG  1.9  2.1  1.8       Imaging:  XR ERCP / [facility] [[id number]] Collected: 10/10/17 1635      Order Status: Completed Updated: 10/10/17 1643     Narrative:       ERCP 10/10/2017    CLINICAL HISTORY: Jaundice. FINDINGS:    5 intraoperative fluoroscopic spot images are submitted for evaluation. The  initial image shows a stent. Subsequent images show removal of the stent and the  retrograde administration of 6.5 mL one half dilution Isovue-370 via the  endoscope. This shows an abnormal long segment stricture of the proximal common  bile duct. The etiology of this is not clear on the images made. There is at  least mild to moderate central intrahepatic biliary ductal dilation. The final  image shows placement of an additional stent and partial decompression of the  intrahepatic bile ducts.       Impression:       IMPRESSION:  1. Imaging findings as described above. Please refer to Dr. Roxy Lake' procedure  notes and impressions for additional information.     US ABD LTD [[id number]] Collected: 10/10/17 0850     Order Status: Completed Updated: 10/10/17 0904     Narrative:       Abdominal ultrasound, 10/10/2017     History: Colon cancer with liver metastases. Hyperbilirubinemia.      Comparison:  None.      Findings: The liver is normal in size measuring 11.3 cm.  No intrahepatic  biliary ductal dilitation is seen.  Liver echogenicity is diffusely  heterogeneous. A right lobe lesion is seen measuring 1.8 cm x 2.2 cm x 1.7 cm  which is incompletely characterized by ultrasound although may represent one of  the patient's known liver metastases.  The main portal vein is patent and  demonstrates hepatopedal flow. A stent is partially visualized in the central  liver.     The gallbladder has been removed.  The common bile duct is obscured by bowel gas  and therefore cannot be assessed. The pancreas is obscured by bowel gas and therefore not well assessed. The right kidney measures 10.2 cm in length.  No shadowing stones, or  hydronephrosis is seen of the right kidney. The IVC is patent. The abdominal aorta is obscured by bowel gas and therefore  cannot be assessed. Incidental note is made of a right pleural effusion.       Impression:       IMPRESSION:   1.  Very limited study due to excessive bowel gas. No significant intrapelvic  biliary ductal dilation is seen. The common bile duct is obscured by bowel gas  and therefore cannot be assessed. A stent is partially visualized in the central  liver. 2.  Heterogeneous liver with at least one distinct lesion measuring 2.2 cm x 1.8  cm x 1.7 cm likely representing one of the patient's known hepatic metastases. .    3.  Right pleural effusion. Discharge Medication List as of 10/12/2017 11:13 AM      START taking these medications    Details   levoFLOXacin (LEVAQUIN) 500 mg tablet Take 1 Tab by mouth every twenty-four (24) hours. , Normal, Disp-2 Tab, R-0      metroNIDAZOLE (FLAGYL) 500 mg tablet Take 1 Tab by mouth every twelve (12) hours every twelve (12) hours. , Normal, Disp-4 Tab, R-0         CONTINUE these medications which have NOT CHANGED    Details   traMADol (ULTRAM) 50 mg tablet Take 50 mg by mouth every six (6) hours as needed for Pain., Historical Med      meloxicam (MOBIC) 7.5 mg tablet Take 7.5 mg by mouth two (2) times a day., Historical Med      pantoprazole (PROTONIX) 40 mg tablet Take 1 Tab by mouth daily. , Normal, Disp-30 Tab, R-2      omeprazole (PRILOSEC) 20 mg capsule Take 1 capsule by mouth daily. , Normal, Disp-30 capsule, R-3      magnesium oxide (MAG-OX) 400 mg tablet Take 800 mg by mouth two (2) times a day., Historical Med      DOCUSATE CALCIUM (STOOL SOFTENER PO) Take 2 Tabs by mouth as needed., Historical Med      pyridoxine (VITAMIN B-6) 50 mg tablet Take 1 Tab by mouth daily. , No Print, Disp-100 Tab, R-3      multivitamin (ONE A DAY) tablet Take 1 Tab by mouth daily. , Historical Med      ondansetron hcl (ZOFRAN) 8 mg tablet Take 1 Tab by mouth every eight (8) hours as needed for Nausea., Normal, Disp-90 Tab, R-1      prochlorperazine (COMPAZINE) 10 mg tablet Take 1 Tab by mouth every six (6) hours as needed., Normal, Disp-90 Tab, R-1         STOP taking these medications       cefUROXime (CEFTIN) 500 mg tablet Comments:   Reason for Stopping:                   OBJECTIVE:  Patient Vitals for the past 8 hrs:   BP Temp Pulse Resp SpO2   10/12/17 0757 149/79 97.7 °F (36.5 °C) 74 18 93 %     Temp (24hrs), Av.7 °F (36.5 °C), Min:97.5 °F (36.4 °C), Max:98.2 °F (36.8 °C)         Physical Exam:  Constitutional: Well developed, well nourished male in no acute distress, lying comfortably on the hospital bed. HEENT: Normocephalic and atraumatic. Oropharynx is clear, mucous membranes are moist. Extraocular muscles are intact. Sclerae icteric. Neck supple without JVD. No thyromegaly present. Lymph node   Deferred   Skin Warm and dry. No bruising and no rash noted. No erythema. No pallor. +Icterus   Respiratory Lungs are clear to auscultation bilaterally without wheezes, rales or rhonchi, normal air exchange without accessory muscle use. CVS Normal rate, regular rhythm and normal S1 and S2. No murmurs, gallops, or rubs. Abdomen Soft, nontender and nondistended, normoactive bowel sounds. No palpable mass. No hepatosplenomegaly. Neuro Grossly nonfocal with no obvious sensory or motor deficits. MSK Normal range of motion in general.  No edema and no tenderness. Psych Appropriate mood and affect.         ASSESSMENT:    Principal Problem:    Acute liver failure without hepatic coma (10/9/2017)    Active Problems:    Metastatic colon cancer to liver [address] [address] (10/10/2016)      Hyperbilirubinemia (10/9/2017)      Other constipation (10/9/2017)      Acute liver failure (10/9/2017)        DISPOSITION:  Follow-up Appointments   Procedures    FOLLOW UP VISIT Appointment in: One Week Please schedule f/u with Dr. Obed Carney with labs within 1 week     Please schedule f/u with Dr. Obed Carney with labs within 1 week     Standing Status:   Standing     Number of Occurrences:   1     Order Specific Question:   Appointment in     Answer: One Week           Over 30 minutes was spent in discharge planning and coordination of care. Christoper Gaucher, NP [facility]  [id number] [place] [address]  Office : [phone]  Fax : [phone]             Attending Addendum:  I personally evaluated the patient with Christoper Gaucher, N.P.,  and agree with the assessment, findings and plan as documented. Appears well, heart regular without murmur, lungs clear, abdomen benign. T bili falling, stable for discahrge, f/u in oncology within a week. Stricture biopsy from ERCP will need to be followed. I spent 32 minutes on evaluation, management, counseling and discharge planning on patient.               Wilson Sigala MD  [facility]  [address] [address]  Office : [phone]  Fax : [phone]

## 2017-10-12 NOTE — DISCHARGE INSTRUCTIONS
DISCHARGE SUMMARY from Nurse    The following personal items are in your possession at time of discharge:    Dental Appliances: None  Visual Aid: Glasses, With patient     Home Medications: None  Jewelry: With patient, Watch  Clothing: With patient, Undergarments, Socks, Shirt, Pants, Footwear  Other Valuables: At bedside, Cell Phone, Other (comment), Wallet ()             PATIENT INSTRUCTIONS:    After general anesthesia or intravenous sedation, for 24 hours or while taking prescription Narcotics:  · Limit your activities  · Do not drive and operate hazardous machinery  · Do not make important personal or business decisions  · Do  not drink alcoholic beverages  · If you have not urinated within 8 hours after discharge, please contact your surgeon on call. Report the following to your surgeon:  · Excessive pain, swelling, redness or odor of or around the surgical area  · Temperature over 100.5  · Nausea and vomiting lasting longer than 4 hours or if unable to take medications  · Any signs of decreased circulation or nerve impairment to extremity: change in color, persistent  numbness, tingling, coldness or increase pain  · Any questions        What to do at Home:  Recommended activity: Activity as tolerated, per MD instructions    If you experience any of the following symptoms fever > 100.5, nausea, vomiting, pain, chest pain, shortness of breath please follow up with MD.      *  Please give a list of your current medications to your Primary Care Provider. *  Please update this list whenever your medications are discontinued, doses are      changed, or new medications (including over-the-counter products) are added. *  Please carry medication information at all times in case of emergency situations.           These are general instructions for a healthy lifestyle:    No smoking/ No tobacco products/ Avoid exposure to second hand smoke    Surgeon General's Warning:  Quitting smoking now greatly reduces serious risk to your health. Obesity, smoking, and sedentary lifestyle greatly increases your risk for illness    A healthy diet, regular physical exercise & weight monitoring are important for maintaining a healthy lifestyle    You may be retaining fluid if you have a history of heart failure or if you experience any of the following symptoms:  Weight gain of 3 pounds or more overnight or 5 pounds in a week, increased swelling in our hands or feet or shortness of breath while lying flat in bed. Please call your doctor as soon as you notice any of these symptoms; do not wait until your next office visit. Recognize signs and symptoms of STROKE:    F-face looks uneven    A-arms unable to move or move unevenly    S-speech slurred or non-existent    T-time-call 911 as soon as signs and symptoms begin-DO NOT go       Back to bed or wait to see if you get better-TIME IS BRAIN. Warning Signs of HEART ATTACK     Call 911 if you have these symptoms:   Chest discomfort. Most heart attacks involve discomfort in the center of the chest that lasts more than a few minutes, or that goes away and comes back. It can feel like uncomfortable pressure, squeezing, fullness, or pain.  Discomfort in other areas of the upper body. Symptoms can include pain or discomfort in one or both arms, the back, neck, jaw, or stomach.  Shortness of breath with or without chest discomfort.  Other signs may include breaking out in a cold sweat, nausea, or lightheadedness. Don't wait more than five minutes to call 911 - MINUTES MATTER! Fast action can save your life. Calling 911 is almost always the fastest way to get lifesaving treatment. Emergency Medical Services staff can begin treatment when they arrive -- up to an hour sooner than if someone gets to the hospital by car. The discharge information has been reviewed with the patient. The patient verbalized understanding.     Discharge medications reviewed with the patient and appropriate educational materials and side effects teaching were provided. Learning About Fluid Overload  What is fluid overload? Fluid overload means that your body has too much water. The extra fluid in your body can raise your blood pressure and force your heart to work harder. It can also make it hard for you to breathe. Most of your body is made up of water. The body uses minerals like sodium and potassium to help organs such as your heart, kidneys, and liver balance how much water you need. For example, the heart pumps blood to move water around the body. And the kidneys work to get rid of the water that the body doesn't need. Health conditions like kidney disease, heart failure, and cirrhosis can cause fluid overload. Other things can cause extra fluid to build up. IV fluids, some medicines, too much salt (sodium) from food, and certain medical treatments can sometimes cause this fluid increase. What are the symptoms? Some of the most common symptoms are:  · Gaining weight over a short period of time. · Swelling in the ankles or legs. · Shortness of breath. How is it treated? The goal of treatment is to remove the extra fluid in your body. Your treatment will depend on the cause. Your doctor may:  · Give you medicines, such as diuretics (also called \"water pills\"). They help your body get rid of the extra fluid. · Restrict your fluid or salt intake. Follow-up care is a key part of your treatment and safety. Be sure to make and go to all appointments, and call your doctor if you are having problems. It's also a good idea to know your test results and keep a list of the medicines you take. Where can you learn more? Go to http://leni-sharmin.info/. Enter O110 in the search box to learn more about \"Learning About Fluid Overload. \"  Current as of: January 12, 2017  Content Version: 11.3  © 9975-7109 [employer].  Care instructions adapted under license by [address] (which disclaims liability or warranty for this information). If you have questions about a medical condition or this instruction, always ask your healthcare professional. [address] any warranty or liability for your use of this information.

## 2017-10-14 NOTE — Clinical Note
Follow-up with the oncologist on Monday as scheduled.   Return if you have worsening symptoms fever, abdominal pain, nausea, vomiting, diarrhea, lethargy, confusion

## 2017-10-14 NOTE — ED TRIAGE NOTES
Pt. Recently discharged from this facility due to admission for liver failure and colon cancer. Pt. Reports increased weakness and jaundice. Pt. Also complains of abdominal pain.

## 2017-10-14 NOTE — DISCHARGE INSTRUCTIONS
Jaundice: Care Instructions  Your Care Instructions  Jaundice is yellowing of your skin and the whites of your eyes. It's caused by a pigment, or coloring, called bilirubin. This comes from the breakdown of red blood cells. When your liver is healthy, it removes the pigment from your blood. But if the liver isn't working right, the pigment can build up in the blood. Then it can get into the skin and other tissues. Many diseases can cause jaundice. These include hepatitis, gallstones, and cancer of the pancreas. Liver damage from heavy drinking over a long time can also cause it. Some medicines that can damage the liver also cause jaundice. The treatment for jaundice depends on the cause. You may need medicine to treat an infection. Or you may need to have your gallbladder removed. Some people need to stop drinking alcohol. If another disease is causing jaundice, treating the disease will cure the jaundice. If a medicine you take is causing jaundice, your doctor may switch you to another one. Follow-up care is a key part of your treatment and safety. Be sure to make and go to all appointments, and call your doctor if you are having problems. It's also a good idea to know your test results and keep a list of the medicines you take. How can you care for yourself at home? · Do not drink any alcohol until your jaundice is gone. Alcohol will damage the liver more. If your jaundice is caused by drinking alcohol, do not drink alcohol even when you are better. Tell your doctor if you need help to quit. Counseling, support groups, and sometimes medicines can help you stay sober. · Be safe with medicines. Do not take any other medicine without talking to your doctor first. This includes over-the-counter medicines, vitamins, and herbal products. · Make sure your doctor knows all the medicines you take. Some medicines, such as acetaminophen (Tylenol), can make liver problems worse.   · If you have itchy skin, keep cool and stay out of the sun. Try to wear cotton clothing. Talk to your doctor about using over-the-counter medicines for itching. These include diphenhydramine (Benadryl) and chlorpheniramine (Chlor-Trimeton). Follow the instructions on the label. · Lower your activity to match your energy. When should you call for help? Call 911 anytime you think you may need emergency care. For example, call if:  · You have severe trouble breathing. · You passed out (lost consciousness). Call your doctor now or seek immediate medical care if:  · You are confused, or your confusion gets worse. · You have a fever or chills. · You have severe pain or swelling in your belly. · You are losing weight without trying. · You are vomiting or feel sick to your stomach. · Your urine is dark yellow-brown, or your stools are light-colored. Watch closely for changes in your health, and be sure to contact your doctor if:  · You do not get better as expected. Where can you learn more? Go to http://leni-sharmin.info/. Enter R448 in the search box to learn more about \"Jaundice: Care Instructions. \"  Current as of: August 9, 2016  Content Version: 11.3  © 9571-2489 [employer]. Care instructions adapted under license by [employer] (which disclaims liability or warranty for this information). If you have questions about a medical condition or this instruction, always ask your healthcare professional. [place] [address] any warranty or liability for your use of this information.

## 2017-10-14 NOTE — PROGRESS NOTES
Visit with patient in ER. Calm. Samanta.     Brady Rios, staff   C: [phone] /  [email or web address]
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
## 2017-10-15 NOTE — PROGRESS NOTES
Transition of Care Discharge Follow-up Questionnaire   Date/Time of Call:   10/15/17 7:44p   What was the patient hospitalized for? Acute liver failure without hepatic coma      Does the patient understand his/her diagnosis and/or treatment and what happened during the hospitalization? Patient understands the diagnosis and treatments that occurred. Did the patient receive discharge instructions? Yes   Review any discharge instructions (see notes in [facility]). Ask patient if they understand these. Do they have any questions? He understands discharge instructions. Were home services ordered (nursing, PT, OT, ST, etc.)? No   If so, has the first visit occurred? If not, why? (Assist with coordination of services if necessary.) n/a   Was any DME ordered? No   If so, has it been received? If not, why?  (Assist with coordination of arranging DME orders if necessary.) n/a   Complete a review of all medications (new, continued and discontinued meds per the D/C instructions and medication tab in [facility]). Patient and care coordinator reviewed current medications. Were all new prescriptions filled? If not, why?  (Assist with obtainment of medications if necessary.) Yes   Does the patient understand the purpose and dosing instructions for all medications? (If patient has questions, provide explanation and education.) Patient understands the dosing instructions for all medications. Does the patient have any problems in performing ADLs? (If patient is unable to perform ADLs  what is the limiting factor(s)? Do they have a support system that can assist? If no support system is present, discuss possible assistance that they may be able to obtain.) Patient does not verbalize having any problems in performing ADLs. Does the patient have all follow-up appointments scheduled?       7 day f/up with PCP?    7-14 day f/up with specialist?    If f/up has not been made  what actions has the care coordinator made to accomplish this? Has transportation been arranged? [facility] Pulmonary follow-up should be within 7 days of discharge; all others should have PCP follow-up within 7 days of discharge; follow-ups with other specialists should be within 7-14 days of discharge.) Patient has the following appt(s):  10/16 Chemo appt/Infusion center  Patient will not have a follow-up appt with PCP due to being immunocompromised from chemo treatment. Patient will follow-up with specialist.    Any other questions or concerns expressed by the patient? CC instructed patient and spouse to return to ED if symptoms worsen and/or contact PCP. Schedule next appointment with MAR[facility] DOLORES [facility] Coordinator or refer to RN Case Manager/  per the workflow guidelines. When is care coordinators next follow-up call scheduled? If referred for CCM  what RN care manager was the referral assigned? N/A     N/A        N/A   MARIA VICTORIA Call Completed By:   Stephani Denson LPN  Care Coordinator       This note will not be viewable in [address]

## 2017-10-16 NOTE — PROGRESS NOTES
Date/Time of Call:   10/13/17 10:00 AM    What was the patient hospitalized for? Patient was hospitalized for Acute liver failure w/out hepatic coma   Does the patient understand his/her diagnosis and/or treatment and what happened during the hospitalization? Spoke to patient who verbalized understanding of treatment and diagnosis. Did the patient receive discharge instructions? Patient states d/c instructions received. Review any discharge instructions (see notes in [facility]). Ask patient if they understand these. Do they have any questions? Patient discussed discharge plan and instruction as pt. understood it to be with Care Coordinator. Which I have documented in the pertinent areas throughout this progress note. Were home services ordered (nursing, PT, OT, ST, etc.)? No HH ordered at d/c. If so, has the first visit occurred? If not, why? (Assist with coordination of services if necessary.) N/A   Was any DME ordered? No DME ordered at d/c. If so, has it been received? If not, why?  (Assist with coordination of arranging DME orders if necessary.) N/A   Complete a review of all medications (new, continued and discontinued meds per the D/C instructions and medication tab in [facility]). All meds reviewed with Care Coordinator and Patient. Levaquin, Flagyl prescribed at d/c. Were all new prescriptions filled? If not, why?  (Assist with obtainment of medications if necessary.) Yes. Does the patient understand the purpose and dosing instructions for all medications? (If patient has questions, provide explanation and education.) Indicated by Patient to Care Coordinator, the purpose and dosing instructions for all medications were understood. Does the patient have any problems in performing ADLs? (If  patient is unable to perform ADLs  what is the limiting factor(s)?   Do they have a support system that can assist? If no support system is present, discuss possible assistance that they may be able to obtain.) Patient states he is independent with all ADLs. Patient also states his wife fully supports and assists him as needed. Does the patient have all follow-up appointments scheduled? 7 day f/up with PCP?    7-14 day f/up with specialist?    If f/up has not been made  what actions has the care coordinator made to accomplish this? Has transportation been arranged? [facility] Pulmonary follow-up should be within 7 days of discharge; all others should have PCP follow-up within 7 days of discharge; follow-ups with other specialists as appropriate or ordered.) Patient states will only f/u with Oncology and that appt. is Monday. Patient denies need   for transportation. Any other questions or concerns expressed by the patient? Gratitude stated and no further questions asked or needs identified. MARIA VICTORIA Call Completed By: Chelsey Alvarado LPN  [facility] [address] Coordinator          This note will not be viewable in [address]

## 2017-10-19 NOTE — PROGRESS NOTES
Arrived to the [facility]. D1C1 [employer]  completed. Patient tolerated well. Any issues or concerns during appointment: no.  Patient aware of next infusion appointment on 11/1/17 (date) at 5 (time). Discharged ambulatory.

## 2017-10-31 PROBLEM — C18.9 COLON CANCER METASTASIZED TO LIVER (HCC): Status: ACTIVE | Noted: 2017-01-01

## 2017-10-31 PROBLEM — R41.82 ALTERED MENTAL STATE: Status: ACTIVE | Noted: 2017-01-01

## 2017-10-31 PROBLEM — R62.51 FAILURE TO THRIVE (0-17): Status: ACTIVE | Noted: 2017-01-01

## 2017-10-31 PROBLEM — C78.7 COLON CANCER METASTASIZED TO LIVER (HCC): Status: ACTIVE | Noted: 2017-01-01

## 2017-10-31 PROBLEM — N17.9 ACUTE KIDNEY INJURY (HCC): Status: ACTIVE | Noted: 2017-01-01

## 2017-10-31 NOTE — ED NOTES
TRANSFER - OUT REPORT:    Verbal report given to INOCENCIO Bojorquez on Melinda Pill  being transferred to 5th floor for routine progression of care       Report consisted of patients Situation, Background, Assessment and   Recommendations(SBAR). Information from the following report(s) SBAR, ED Summary and MAR was reviewed with the receiving nurse. Lines:   Venous Access Device Port Upper chest (subclavicular area), left (Active)        Opportunity for questions and clarification was provided.       Patient transported with:   O2 @ 2 liters

## 2017-10-31 NOTE — PROGRESS NOTES
TRANSFER - IN REPORT:    Verbal report received from Tiffanie Reynolds RN (name) on Lawence Iha  being received from ED (unit) for routine progression of care      Report consisted of patients Situation, Background, Assessment and   Recommendations(SBAR). Information from the following report(s) SBAR, ED Summary, MAR, Recent Results and Med Rec Status was reviewed with the receiving nurse. Opportunity for questions and clarification was provided. Assessment completed upon patients arrival to unit and care assumed.

## 2017-10-31 NOTE — PROGRESS NOTES
Pharmacokinetic Consult to Pharmacist    Sharri Ontiveros is a 75 y.o. male being treated for sepsis with vancomycin. Height: 5' 8\" (172.7 cm)     Lab Results   Component Value Date/Time    BUN 42 10/31/2017 01:12 PM    Creatinine 1.54 10/31/2017 01:12 PM    WBC 13.8 10/31/2017 01:12 PM    Lactic Acid (POC) 3.5 10/31/2017 04:36 PM      CrCl cannot be calculated (Unknown ideal weight.). CULTURES:  All Micro Results     Procedure Component Value Units Date/Time    CULTURE, BLOOD [[id number]]     Order Status:  Sent Specimen:  Blood from Blood     CULTURE, BLOOD [[id number]] Collected:  10/31/17 1415    Order Status:  Completed Specimen:  Blood from Blood Updated:  10/31/17 1501    CULTURE, BLOOD [[id number]] Collected:  10/31/17 1312    Order Status:  Completed Specimen:  Blood from Blood Updated:  10/31/17 1345          Day 1 of vancomycin. Goal trough is 15-20. Vancomycin dose initiated at 1500 mg IV q24h. Plan to check a steady state level as indicated. Pharmacy will continue to follow. Please call with any questions.     Thank you,  Ana Jorgensen, PharmD  Clinical Pharmacist  [phone]
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
## 2017-10-31 NOTE — ED TRIAGE NOTES
Pt has had confusion for a couple of days, generalized weakness since yesterday evening has been exacerbated, states he is hurting all over, has been urinating blood, failure to thrive, is tachy in triage.

## 2017-11-01 PROBLEM — C78.7 COLON CANCER METASTASIZED TO LIVER (HCC): Chronic | Status: ACTIVE | Noted: 2017-01-01

## 2017-11-01 PROBLEM — C18.9 COLON CANCER METASTASIZED TO LIVER (HCC): Chronic | Status: ACTIVE | Noted: 2017-01-01

## 2017-11-01 PROBLEM — J90 PLEURAL EFFUSION ON RIGHT: Status: ACTIVE | Noted: 2017-01-01

## 2017-11-01 PROBLEM — R62.51 FAILURE TO THRIVE (0-17): Chronic | Status: ACTIVE | Noted: 2017-01-01
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
## 2017-11-01 NOTE — PROGRESS NOTES
PT note:    Chart reviewed and attempted PT evaluation this morning however pt is currently off floor. Will check back later time/date as schedule allows.     Thanks,  REBECCA LawsonT

## 2017-11-01 NOTE — PROGRESS NOTES
[facility]        Inpatient Hematology / Oncology Progress Note      Admission Date: 10/31/2017 12:56 PM  Reason for Admission/Hospital Course: Acute liver failure  Failure to thrive (0-17)  Altered mental state  Acute kidney injury ([address]  Colon cancer metastasized to liver (HCC)    24 Hour Events:  Afebrile, vitals stable  3L O2 - CXR with large pleural effusion  Remains drowsy, easily arousable  Oriented to person  UA +  + RUQ pain      ROS:  Constitutional: Positive for fatigue/malaise; negative for fever, chills  CV: Negative for chest pain, palpitations, edema. Respiratory: Negative for dyspnea, cough, wheezing. GI: Positive for RUQ pain; negative for nausea    10 point review of systems is otherwise negative with the exception of the elements mentioned above in the HPI. Allergies   Allergen Reactions    Oxaliplatin Shortness of Breath, Rash and Other (comments)     Severe abdominal pain       OBJECTIVE:  Patient Vitals for the past 8 hrs:   BP Temp Pulse Resp SpO2 Weight   17 1130 125/62 97.4 °F (36.3 °C) 94 20 94 % -   17 0801 101/65 97.7 °F (36.5 °C) (!) 102 20 90 % -   17 0653 - - - - - 175 lb 4.8 oz (79.5 kg)     Temp (24hrs), Av °F (36.7 °C), Min:97.4 °F (36.3 °C), Max:99.6 °F (37.6 °C)    701 -  190  In: 1964 [I.V.:1964]  Out: -     Physical Exam:  Constitutional: Ill-appearing male in no acute distress, lying comfortably in the hospital bed. HEENT: Normocephalic and atraumatic. Oropharynx is clear, mucous membranes are moist. +icteric sclera Neck supple     Lymph node   Deferred   Skin Warm and dry. No bruising and no rash noted. No erythema. No pallor. Respiratory Lungs are clear to auscultation bilaterally without wheezes, rales or rhonchi, normal air exchange without accessory muscle use. CVS Normal rate, regular rhythm and normal S1 and S2. No murmurs, gallops, or rubs.    Abdomen Soft, +tenderness to light touch RUQ and nondistended, normoactive bowel sounds. Neuro Drowsy, oriented to person   MSK Normal range of motion in general.  No edema and no tenderness. Psych Appropriate mood and affect. Labs:    Recent Labs      11/01/17   0716  10/31/17   2058  10/31/17   1312   WBC  8.8  11.2*  13.8*   RBC  2.84*  3.20*  3.30*   HGB  9.7*  10.9*  11.3*   HCT  28.2*  31.9*  32.9*   MCV  99.3*  99.7*  99.7*   MCH  34.2*  34.1*  34.2*   MCHC  34.4  34.2  34.3   RDW  16.0*  15.8*  15.7*   PLT  140*  149*  171   GRANS  88*  90*  92*   LYMPH  5*  4*  3*   MONOS  6  5  4   EOS  1  1  0*   BASOS  0  0  0   IG  0  0  1   DF  AUTOMATED  AUTOMATED  AUTOMATED   ANEU  7.8  10.2*  12.6*   ABL  0.4*  0.5  0.5   ABM  0.5  0.5  0.5   DINA  0.1  0.1  0.1   ABB  0.0  0.0  0.0   AIG  0.0  0.0  0.1      Recent Labs      11/01/17   0716  10/31/17   2058  10/31/17   1312   NA  139   --   137   K  4.8   --   4.9   CL  106   --   100   CO2  22   --   23   AGAP  11   --   14   GLU  66   --   160*   BUN  42*   --   42*   CREA  1.19   --   1.54*   GFRAA  >60   --   58*   GFRNA  >60   --   48*   CA  8.3   --   9.1   SGOT  98*   --   99*   AP  484*   --   563*   TP  4.8*   --   5.9*   ALB  1.3*   --   1.6*   GLOB  3.5   --   4.3*   AGRAT  0.4*   --   0.4*   MG   --   2.1   --      Imaging:  US ABD LTD [[id number]] Collected: 11/01/17 1014      Order Status: Completed Updated: 11/01/17 1019     Narrative:       Right upper quadrant abdominal ultrasound    History: Jaundice, h/o biliary stricture, metastatic colon cancer, 75 years Male    Comparison: Abdominal ultrasound October 14, 2017, CT torso September 20, 2017. Findings: The liver is heterogeneous in echotexture and there is no dilatation  of the intrahepatic ducts.  Again visualized is a small hypoechoic mass adjacent  to the right hepatic lobe which may be subcapsular or subserosal measuring 2.2 x  1.4 x 1.4 cm, consistent with a metastatic deposit as seen on prior CT torso.    The liver measures 14 cm in midclavicular length.  Normal hepatopedal flow  within the main portal vein.  The gallbladder is surgically absent. The proximal  common duct is not dilated and measures 7 mm, containing a biliary stent.  The  right kidney is normal appearing and measures 8.3 cm in length.  Pancreas not  well-visualized due to overlying bowel gas. The visualized portions of the IVC  are unremarkable.  And aorta not visualized due to overlying bowel gas.  Small  volume ascites unchanged.  Small right pleural effusion unchanged.       Impression:       Impression:     1.  No significant interval change in hepatic serosal metastatic deposits, small  volume ascites, and small right pleural effusion. 2.  Other chronic findings as above.     XR ABD (KUB) [[id number]] Collected: 11/01/17 0900     Order Status: Completed Updated: 11/01/17 0903     Narrative:       KUB supine views    History:  decreased bowel sounds, abdominal pain, 75 years Male    Comparison:  Chest radiograph October 13, 2016, yesterday    Findings: Biliary stent appears in anatomic position.  No free air is evident. The bowel gas pattern is nonspecific and there is no evidence of ileus or  obstruction.  No suspicious renal or ureteral calculi are evident.  Small right  and trace left pleural effusions relatively unchanged since yesterday. Visualized osseous structures unremarkable.       Impression:       Impression: No acute intra-abdominal pathology identified.             XR CHEST PORT [[id number]] Collected: 10/31/17 1324     Order Status: Completed Updated: 10/31/17 1329     Narrative:       1 View portable chest x-ray 10/31/2017 1:21 PM    Indication: Chest pressure pain. Patient has history of colon cancer. Comparison: CT chest 9/20/2017    Findings: This portable upright AP chest of 1304 shows the lungs somewhat  underventilated. An implanted venous port does remain in place on the left. The  heart is not significantly enlarged.  There is clearly increased reticular and  hazy opacities on the right with the hemidiaphragm and costophrenic angle  obscured. The left lung appears overall still clear.       Impression:       IMPRESSION: Probably there is now a large right pleural effusion with associated  atelectasis. ASSESSMENT:    Problem List  Date Reviewed: 10/16/2017          Codes Class Noted    Altered mental state ICD-10-CM: R41.82  ICD-9-CM: 780.97  10/31/2017        Failure to thrive (0-17) ICD-10-CM: R62.51  ICD-9-CM: 783.41  10/31/2017        Acute kidney injury [facility]) ICD-10-CM: N17.9  ICD-9-CM: 584.9  10/31/2017        Colon cancer metastasized to [facility]) ICD-10-CM: C18.9, C78.7  ICD-9-CM: 153.9, 197.7  10/31/2017        Hyperbilirubinemia ICD-10-CM: E80.6  ICD-9-CM: 782.4  10/9/2017        Acute liver failure without hepatic coma ICD-10-CM: K72.00  ICD-9-CM: 721  10/9/2017        Other constipation ICD-10-CM: K59.09  ICD-9-CM: 564.09  10/9/2017        Acute liver failure ICD-10-CM: K72.00  ICD-9-CM: 679  10/9/2017        Small bowel obstruction ICD-10-CM: X81.671  ICD-9-CM: 560.9  6/5/2017        Dehydration ICD-10-CM: E86.0  ICD-9-CM: 276.51  6/2/2017        Malignant neoplasm of colon ([address] (Chronic) ICD-10-CM: C18.9  ICD-9-CM: 153.9  10/10/2016        Metastatic colon cancer to [facility]) (Chronic) ICD-10-CM: C18.9, C78.7  ICD-9-CM: 153.9, 197.7  10/10/2016        Hand foot syndrome ICD-10-CM: L27.1  ICD-9-CM: 693.0  7/6/2016        Dysgeusia ICD-10-CM: R43.2  ICD-9-CM: 781.1  6/10/2014        Fatigue ICD-10-CM: R53.83  ICD-9-CM: 780.79  10/29/2013        Urinary urgency ICD-10-CM: R39.15  ICD-9-CM: 116.32  10/26/2012    Overview Signed 10/26/2012  2:16 PM by Neel Meadows     10-26-12 start flomax                   Mr. Robin Gonzalez is 68-year-old white male well known to us. He is a patient of Dr. Ernesto Reyes with history of colon cancer with metastasis to the liver with subsequent liver failure.   10/31 he presented to the emergency department with increasing weakness and confusion over the past several days.  His wife reports that he has been incoherent, unable to walk, and having difficulty speaking since last night.  She also reports that he has had worsening jaundice and that his urine has been bloody at times as well as dark yellow. She reports no fever or vomiting.  Over the past 3 days his PO intake has been minimal.  In the past, he has had several lines of chemotherapy with his most recent being Vectibix. He has only received 1 cycle on 10/19/17. He is scheduled to receive his next cycle on 11/2/17. His wife reports that he complained of chest pain earlier today, but upon arriving to the ED he denied chest pain and shortness of breath.     PLAN:  Elevated LFTs/hyperbilirubinemia  - Bilirubin 9.3 today. Seen by GI. Abdominal ultrasound done this morning. No evidence of obstruction (stent in place) thus no indication for ERCP. GI feels due to medications or congestion from intrahepatic mets. Will change rocephin to levaquin and follow    UTI/sepsis  - Repeat lactic acid this morning. Continue vanc. Change rocephin to levaquin. Await cultures    Pleural effusion  - Noted on chest xray. Will have pulmonary evaluate for thoracentesis    AMS  - Likely multifactorial: liver failure, sepsis. Will check CT head to rule out bleed, metastasis, etc    Colon cancer with liver mets  - S/p cycle 1 vectibix. Cycle 2 due tomorrow - will hold until clinically improved    Updated spouse and brother on current findings and plan of care  Светлана Calzada  SCDs for DVT prophylaxis  Consult PT/OT              [facility]  [address] [address]  Office : [phone]  Fax : [phone]         Attending Addendum:  I personally evaluated the patient with Hector Valentin N.P.,  and agree with the assessment, findings and plan as documented. Appears stable, we will follow-up his urine cultures. Oxana Granados MD  [facility]  [address] [address]  Office : [phone]  Fax : [phone]

## 2017-11-01 NOTE — PROGRESS NOTES
PT note: Attempted PT evaluation again this afternoon however wife is at bedside assisting pt with eating lunch and requested PT return at another time. Will check back later time/date as schedule allows.     Thanks,  Manju Costa, DPT

## 2017-11-01 NOTE — PROCEDURES
PROCEDURE:  DIAGNOSTIC THORACENTESIS and THERAPEUTIC THORACENTESIS       PRE-OP DIAGNOSIS:  R PLEURAL EFFUSION    POST-OP DIAGNOSIS:  R PLEURAL EFFUSION    VOLUME REMOVED:    1300    ANESTHESIA:    LOCAL ANESTHESIA WITH 1% LIDOCAINE 10 CC TOTAL. CHEST ULTRASOUND FINDINGS:    A Turbo-M, Sonosite ultrasound with a 5-16 mHz probe was used to image the chest and localize the pleural effusion on the right chest.    A large anechoic space was seen on the right consistent with an uncomplicated pleural effusion. DESCRIPTION OF PROCEDURE:    After obtaining informed consent and localizing the safest location for thoracentesis, the  8th intercostal space was marked with a blunt, plastic needle cap in the mid scapular line. An [employer] AK-0100 Pleral-Seal thoracentesis kit was used to perform the procedure. The skin was cleansed with the supplied chlorhexidine swab and then draped in the usual fashion. Using the previously marked location as a guide, a 22 G 1.5 inch needle was used to inject 1% lidocaine into the skin and subcutaneous tissue, as well as onto the underlying rib and inter-costal muscles. Pleural fluid was aspirated to assure proper location and additional lidocaine was injected into the pleural space prior to removing the anesthesia needle. A 3mm incision was then made with the supplied scalpel in the usual fashion to facilitate the insertion of the thoracentesis needle. The needle with an 8 Gabonese thoracentesis catheter was then introduced into the chest through the previously made incision in the usual fashion, the rib localized with the needle, and the catheter then marched over the rib into the pleural space. After aspirating fluid, the thoracentesis catheter was then placed into the chest using the needle itself as a trocar. The needle was then removed and the catheter was attached to the supplied tubing without complication.     1300 cc of bright yellow fluid was aspirated and sent for analysis. Fluid was sent for the following tests:      LDH  Total Protein  Glucose  Cell count with differential  Routine culture and Gram stain  Cytology  AFB  Fungus      Post procedure US confirmed incomplete drainage of the effusion and presence of lung sliding, ruling out pneumothorax. EBL:     <8UK    COMPLICATIONS:    Patient began experiencing some cough and fluid return stopped. However, post procedure US showed ongoing presence of a small amount of fluid. Will repeat CXR tomorrow and f/u results.        Marlen Kirk MD

## 2017-11-01 NOTE — PROGRESS NOTES
Bedside shift change report given to  (oncoming nurse) by Tutu Grossman RN (offgoing nurse). Report included the following information SBAR, Kardex and MAR. CT head and abd U/S completed today. Rt thora done at bedside by pulmonary. Resting quietly at present.

## 2017-11-01 NOTE — PROGRESS NOTES
OT NOTE:    Charts reviewed, orders appreciated, evaluation attempted. Pt with wife at bedside assisting with lunch, requesting therapy return later in afternoon. Will re-attempt as schedule allows.     Thanks,    Tayler Burrell, OTR/L

## 2017-11-01 NOTE — PROGRESS NOTES
Dual skin assessment completed by this RN and Lenny Youssef RN. S2 pressure ulcer noted to buttocks; allevyn applied. Skin is dry, flaky, and fragile. Small healing abrasions noted to RUE. Umbilical hernia. Left upper chest port c/d/i.

## 2017-11-01 NOTE — PROGRESS NOTES
END OF SHIFT NOTE:    Intake/Output  11/01 0701 - 11/01 1900  In: 1964 [I.V.:1964]  Out: -    Voiding: YES  Catheter: NO  Drain:              Stool:  0 occurrences. Emesis:  1 occurrences. VITAL SIGNS  Patient Vitals for the past 12 hrs:   Temp Pulse Resp BP SpO2   11/01/17 0449 97.5 °F (36.4 °C) (!) 102 22 112/59 90 %   10/31/17 2350 97.4 °F (36.3 °C) (!) 113 22 112/58 94 %       Pain Assessment  Pain 1  Pain Scale 1: FLACC (11/01/17 0449)  Pain Intensity 1: 0 (11/01/17 0449)  Patient Stated Pain Goal: 0 (10/31/17 1828)  Pain Reassessment 1: Patient sleeping (11/01/17 0300)    Ambulating  No    Additional Information: Drowsy but a/o x3. 10/31 2100 lactic acid called to on call. Pt VVS. Received 1L NS bolus. Voiding small amounts very dark urine. Still need U C&S. Pt has been NPO for US today. Medicated with Ultram once for pain. On Vanc and Rocephin. Continue with POC. Shift report given to oncoming nurse at the bedside.     Eusebia Reyes RN
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
## 2017-11-01 NOTE — PROGRESS NOTES
Pt sat up on side of bed for thoracentesis. Consent obtained. Time out performed. Pts vitals monitored throughout procedure. Right ultrasound done and pic taken of pleural fluid. ~1300 ml bright yellow pleural fluid from right. Pt tolerated procedure well with no adverse rxn. Specimens sent to the lab x 3 and labeled appropriately. Site dressed appropriately and report given to pts INOCENCIO Trevino.    Lung sliding done and ultrasound findings reviewed by MD.

## 2017-11-01 NOTE — PROGRESS NOTES
END OF SHIFT NOTE:  - Patient arrived alone from ED drowsy and delayed in response. Patient is currently alert to person only. Patient is extremely weak, but is able to turn from side to side with assistance. PT ordered. - Allevyn applied to buttocks (S2 pressure ulcer noted). - Denies (by nod of head) to pain, n/v/d, constipation    Intake/Output  Please see I/Os  Voiding: YES    Stool:  0 occurrences. Emesis:  0 occurrences.         VITAL SIGNS  Patient Vitals for the past 12 hrs:   Temp Pulse Resp BP SpO2   10/31/17 1939 - - - - 94 %   10/31/17 1930 97.9 °F (36.6 °C) (!) 107 20 120/68 94 %   10/31/17 1831 98.3 °F (36.8 °C) (!) 109 23 121/68 95 %   10/31/17 1712 99.6 °F (37.6 °C) (!) 103 23 122/65 95 %   10/31/17 1704 - (!) 105 18 - 93 %   10/31/17 1657 - (!) 121 29 140/67 90 %   10/31/17 1653 - - - - 95 %   10/31/17 1642 - (!) 102 26 121/62 94 %   10/31/17 1627 - (!) 104 25 124/62 93 %   10/31/17 1613 - (!) 103 22 112/56 94 %   10/31/17 1603 - (!) 108 24 - 93 %   10/31/17 1558 - (!) 105 14 - 91 %   10/31/17 1557 - (!) 105 23 134/70 -   10/31/17 1553 - (!) 104 24 - 92 %   10/31/17 1549 - (!) 105 15 - 94 %   10/31/17 1546 - (!) 105 20 - 95 %   10/31/17 1542 - (!) 105 23 122/66 94 %   10/31/17 1527 - (!) 105 21 118/67 94 %   10/31/17 1516 - (!) 106 20 - 96 %   10/31/17 1512 - (!) 107 22 120/73 96 %   10/31/17 1457 - (!) 109 15 123/68 93 %   10/31/17 1442 - (!) 108 22 127/68 90 %   10/31/17 1434 - (!) 109 22 - 92 %   10/31/17 1427 - (!) 104 21 124/66 -   10/31/17 1426 - (!) 104 23 - 93 %   10/31/17 1412 - (!) 107 25 125/64 93 %   10/31/17 1357 - (!) 108 21 129/67 94 %   10/31/17 1342 - (!) 106 - 122/68 94 %   10/31/17 1327 - (!) 110 22 121/67 93 %   10/31/17 1326 - (!) 109 21 - 94 %   10/31/17 1312 - (!) 121 26 123/66 93 %   10/31/17 1302 - (!) 112 - 126/64 93 %   10/31/17 1251 99.9 °F (37.7 °C) (!) 128 13 115/73 95 %     Pain Assessment  Pain 1  Pain Scale 1: Numeric (0 - 10) (10/31/17 7232)  Pain Intensity 1: 0 (10/31/17 1828)  Patient Stated Pain Goal: 0 (10/31/17 1828)    Ambulating  No    Shift report given to Nesha Hilliard RN at the bedside.     Hussein Lin RN

## 2017-11-02 NOTE — PROGRESS NOTES
END OF SHIFT NOTE:    Intake/Output  11/01 1901 - 11/02 0700  In: 2588 [P.O.:240; I.V.:2348]  Out: 0    Voiding: YES  Catheter: NO  Drain:              Stool:  0 occurrences. Emesis:  0 occurrences. VITAL SIGNS  Patient Vitals for the past 12 hrs:   Temp Pulse Resp BP SpO2   11/02/17 0352 97.7 °F (36.5 °C) 81 18 127/58 99 %   11/01/17 2342 97.4 °F (36.3 °C) 80 20 125/63 99 %   11/01/17 2001 97.6 °F (36.4 °C) 90 18 109/63 100 %       Pain Assessment  Pain 1  Pain Scale 1: Numeric (0 - 10) (11/02/17 0200)  Pain Intensity 1: 0 (11/02/17 0200)  Patient Stated Pain Goal: 0 (11/02/17 0200)  Pain Reassessment 1: Patient sleeping (11/01/17 0300)    Ambulating  No    Additional Information: patient still having some blood in urine. Changed from oriented x2 at beginning of shift to oriented x1 this morning. Confused this morning and extremely drowsy. No pain meds needed. Vitals stable. Shift report given to oncoming nurse at the bedside.     Maryann Caba RN

## 2017-11-02 NOTE — PROGRESS NOTES
Problem: Falls - Risk of  Goal: *Absence of Falls  Document Glenn Fall Risk and appropriate interventions in the flowsheet.    Outcome: Progressing Towards Goal  Fall Risk Interventions:  Mobility Interventions: Bed/chair exit alarm, Patient to call before getting OOB    Mentation Interventions: Adequate sleep, hydration, pain control, Bed/chair exit alarm, Door open when patient unattended, Reorient patient, Toileting rounds    Medication Interventions: Bed/chair exit alarm, Patient to call before getting OOB    Elimination Interventions: Call light in reach, Patient to call for help with toileting needs, Toileting schedule/hourly rounds    History of Falls Interventions: Bed/chair exit alarm, Door open when patient unattended

## 2017-11-02 NOTE — PROGRESS NOTES
Problem: Mobility Impaired (Adult and Pediatric)  Goal: *Acute Goals and Plan of Care (Insert Text)  Discharge Goals:  (1.)Mr. Rodriguez will move from supine to sit and sit to supine , scoot up and down and roll side to side with INDEPENDENT within 7 day(s). (2.)Mr. Rodriguez will transfer from bed to chair and chair to bed with INDEPENDENT using the least restrictive device within 7 day(s). (3.)Mr. Rodriguez will ambulate with SUPERVISION for 500+ feet with the least restrictive device within 7 day(s). (4.)Mr. Rodriguez will ascend/descend 1 flight of stairs with use of 1 railing with STAND BY ASSIST within 7 days. ________________________________________________________________________________________________      PHYSICAL THERAPY: Initial Assessment, Treatment Day: Day of Assessment, PM 11/2/2017  INPATIENT: Hospital Day: 3  [place] / Product Type: Medicare /      NAME/AGE/GENDER: Armando Velarde is a 75 y.o. male   PRIMARY DIAGNOSIS: Pleural effusion [J90] Colon cancer metastasized to liver [facility]) [facility]  Procedure(s) (LRB):  THORACENTESIS (N/A)  ULTRASOUND (Right)  1 Day Post-Op  ICD-10: Treatment Diagnosis:   · Generalized Muscle Weakness (M62.81)  · Difficulty in walking, Not elsewhere classified (R26.2)   Precaution/Allergies:  Oxaliplatin      ASSESSMENT:     Mr. Devon Sanchez is in bathroom finishing working with OT with wife at bedside upon contact and agreeable to PT evaluation this afternoon. Pt demonstrates fair static and dynamic standing balance. Pt ambulated from bathroom back to bedside chair with Jewell and HHA from PT. Pt attempted to sit before reaching chair requiring mod-max A X 2 to prevent fall. Pt able to scoot back into chair with SBA. PT educated pt on safety of transfers. Pt is oriented to person and situation only this session.  Pt reports living with his wife in 2 story home with 4 steps to enter with 0 railing and 12 step at interior to a shower upstairs. Pt was independent with gait and ADLs, driving, 0 falls, and working as a . Pt left sitting up in chair with wife at bedside with posey activated with all needs met and within reach. Elvira Pugh will benefit from skilled PT (medically necessary) to address decreased strength, decreased balance, decreased functional tolerance, decreased cardiopulmonary endurance affecting participation in basic ADLs and functional tasks. This section established at most recent assessment   PROBLEM LIST (Impairments causing functional limitations):  1. Decreased Strength  2. Decreased ADL/Functional Activities  3. Decreased Transfer Abilities  4. Decreased Ambulation Ability/Technique  5. Decreased Balance  6. Increased Pain  7. Decreased Activity Tolerance  8. Decreased Pacing Skills  9. Increased Fatigue  10. Decreased [place] with Home Exercise Program   INTERVENTIONS PLANNED: (Benefits and precautions of physical therapy have been discussed with the patient.)  1. Balance Exercise  2. Bed Mobility  3. Family Education  4. Gait Training  5. Home Exercise Program (HEP)  6. Neuromuscular Re-education/Strengthening  7. Range of Motion (ROM)  8. Therapeutic Exercise/Strengthening  9. Transfer Training  10. Group Therapy     TREATMENT PLAN: Frequency/Duration: 3 times a week for duration of hospital stay  Rehabilitation Potential For Stated Goals: [facility] [place] REHABILITATION/EQUIPMENT: (at time of discharge pending progress): Due to the probability of continued deficits (see above) this patient will likely need continued skilled physical therapy after discharge. Equipment:    None at this time              HISTORY:   History of Present Injury/Illness (Reason for Referral):  See H&P below  Mr. Jake Gordon is 39-year-old white male well known to us. He is a patient of Dr. Blossom Grissom with history of colon cancer with metastasis to the liver with subsequent liver failure.   Today he presented to the emergency department with increasing weakness and confusion over the past several days. [facility] wife reports that he has been incoherent, unable to walk, and having difficulty speaking since last night.  She also reports that he has had worsening jaundice and that his urine has been bloody at times as well as dark yellow. She reports no fever or vomiting.  Over the past 3 days his PO intake has been minimal.  In the past, he has had several lines of chemotherapy with his most recent being Vectibix. He has only received 1 cycle on 10/19/17. He is scheduled to receive his next cycle on 11/2/17. His wife reports that he complained of chest pain earlier today, but upon arriving to the ED he denied chest pain and shortness of breath. Past Medical History/Comorbidities:   Mr. Julián Lopez  has a past medical history of Cancer ([facility] (2012); Colon cancer ([facility] GERD (gastroesophageal reflux disease); History of anemia; Hypertension; Other ill-defined conditions(799.89); Psychiatric disorder; PTSD (post-traumatic stress disorder); PUD (peptic ulcer disease); Ulcer ([facility] (2008); and Unspecified sleep apnea. He also has no past medical history of Difficult intubation; Malignant hyperthermia due to anesthesia; Nausea & vomiting; or Pseudocholinesterase deficiency. Mr. Julián Lopez  has a past surgical history that includes egd deliver thermal energy sphnctr/cardia gerd (2008); colonoscopy (N/A, 9/13/2016); vascular access; colonoscopy; heent (1980s); tonsillectomy; and flexible sigmoidoscopy (N/A, 6/9/2017).   Social History/Living Environment:   Home Environment: Private residence  # Steps to Enter: 1  One/Two Story Residence: Two story  # of Interior Steps: 12  Lift Chair Available: No  Living Alone: No  Support Systems: Spouse/Significant Other/Partner, Friends \ neighbors, Family member(s)  Patient Expects to be Discharged to[de-identified] Unknown  Current DME Used/Available at Home: Cane, straight  Prior Level of Function/Work/Activity:  Lives with wife, indep with gait and ADLs, 0 falls, drives, working as    Number of Personal Factors/Comorbidities that affect the Plan of Care: 3+: HIGH COMPLEXITY   EXAMINATION:   Most Recent Physical Functioning:   Gross Assessment:  AROM: Within functional limits  Strength: Generally decreased, functional  Coordination: Generally decreased, functional  Sensation: Intact               Posture:     Balance:  Standing - Static: Fair  Standing - Dynamic : Fair Bed Mobility:     Wheelchair Mobility:     Transfers:  Sit to Stand: Contact guard assistance  Stand to Sit: Contact guard assistance  Gait:     Base of Support: Narrowed; Center of gravity altered  Speed/Brigitte: Shuffled; Slow  Step Length: Left shortened;Right shortened  Gait Abnormalities: Decreased step clearance;Trunk sway increased; Path deviations  Distance (ft): 10 Feet (ft)  Assistive Device:  (HHA)  Ambulation - Level of Assistance: Contact guard assistance;Minimal assistance  Interventions: Safety awareness training; Tactile cues; Verbal cues;Manual cues      Body Structures Involved:  1. Heart  2. Lungs  3. Bones  4. Joints  5. Muscles  6. Ligaments Body Functions Affected:  1. Mental  2. Sensory/Pain  3. Cardio  4. Respiratory  5. Neuromusculoskeletal  6. Movement Related Activities and Participation Affected:  1. General Tasks and Demands  2. Mobility  3. Self Care  4. Domestic Life  5. Interpersonal Interactions and Relationships  6. Community, Social and [place]   Number of elements that affect the Plan of Care: 4+: HIGH COMPLEXITY   CLINICAL PRESENTATION:   Presentation: Evolving clinical presentation with changing clinical characteristics: MODERATE COMPLEXITY   CLINICAL DECISION MAKIN [facility] Mobility Inpatient Short Form  How much difficulty does the patient currently have. .. Unable A Lot A Little None   1.   Turning over in bed (including adjusting bedclothes, sheets and blankets)? [] 1   [] 2   [x] 3   [] 4   2. Sitting down on and standing up from a chair with arms ( e.g., wheelchair, bedside commode, etc.)   [] 1   [] 2   [x] 3   [] 4   3. Moving from lying on back to sitting on the side of the bed? [] 1   [] 2   [x] 3   [] 4   How much help from another person does the patient currently need. .. Total A Lot A Little None   4. Moving to and from a bed to a chair (including a wheelchair)? [] 1   [] 2   [x] 3   [] 4   5. Need to walk in hospital room? [] 1   [x] 2   [] 3   [] 4   6. Climbing 3-5 steps with a railing? [] 1   [x] 2   [] 3   [] 4   © 2007, Trustees of [address] [place] [id number], under license to [employer]. All rights reserved      Score:  Initial: 16 Most Recent: X (Date: -- )    Interpretation of Tool:  Represents activities that are increasingly more difficult (i.e. Bed mobility, Transfers, Gait). Score 24 23 22-20 19-15 14-10 9-7 6     Modifier CH CI CJ CK CL CM CN      ? Mobility - Walking and Moving Around:     - CURRENT STATUS: CK - 40%-59% impaired, limited or restricted    - GOAL STATUS: CJ - 20%-39% impaired, limited or restricted    - D/C STATUS:  ---------------To be determined---------------  [place] / [place] / Product Type: Medicare /      Medical Necessity:     · Patient is expected to demonstrate progress in strength, balance, coordination and functional technique to decrease assistance required with gait, transfers,and functional mobility. Reason for Services/Other Comments:  · Patient continues to require skilled intervention due to decreased strength, decreased balance, decreased functional tolerance, decreased cardiopulmonary endurance affecting participation in basic ADLs and functional tasks.    Use of outcome tool(s) and clinical judgement create a POC that gives a: Questionable prediction of patient's progress: MODERATE COMPLEXITY            TREATMENT:   (In addition to Assessment/Re-Assessment sessions the following treatments were rendered)   Pre-treatment Symptoms/Complaints:  None, confused  Pain: Initial:   Pain Intensity 1: 0  Post Session:  Unchanged with mobility, 0/10     Assessment/Reassessment only, no treatment provided today    Braces/Orthotics/Lines/Etc:   · IV  · O2 Device: Room air  Treatment/Session Assessment:    · Response to Treatment:  Fair. Unsteadiness with gait and impulsive/unsafe with mobility at times. · Interdisciplinary Collaboration:   o Physical Therapist  o Occupational Therapist  o Registered Nurse  · After treatment position/precautions:   o Up in chair  o Bed alarm/tab alert on  o Bed/Chair-wheels locked  o Bed in low position  o Caregiver at bedside  o Call light within reach   · Compliance with Program/Exercises: Will assess as treatment progresses. · Recommendations/Intent for next treatment session: \"Next visit will focus on advancements to more challenging activities and reduction in assistance provided\".   Total Treatment Duration:  PT Patient Time In/Time Out  Time In: 1346  Time Out: Letitia Mars

## 2017-11-02 NOTE — PROGRESS NOTES
Went into pt room port not accessed due to came out last night so port re accessed per hospital policy.

## 2017-11-02 NOTE — PROGRESS NOTES
[facility]        Inpatient Hematology / Oncology Progress Note      Admission Date: 10/31/2017 12:56 PM  Reason for Admission/Hospital Course: Pleural effusion [J90]    24 Hour Events:  Afebrile, vitals stable  Thoracentesis yesterday  Not eating well      ROS:  Constitutional: Positive for fatigue/malaise; negative for fever, chills  CV: Negative for chest pain, palpitations, edema. Respiratory: Negative for dyspnea, cough, wheezing. GI: Positive for RUQ pain; negative for nausea    10 point review of systems is otherwise negative with the exception of the elements mentioned above in the HPI. Allergies   Allergen Reactions    Oxaliplatin Shortness of Breath, Rash and Other (comments)     Severe abdominal pain       OBJECTIVE:  Patient Vitals for the past 8 hrs:   BP Temp Pulse Resp SpO2   17 1121 116/70 97.9 °F (36.6 °C) 92 18 96 %   17 0734 129/68 97.2 °F (36.2 °C) 93 18 99 %     Temp (24hrs), Av.6 °F (36.4 °C), Min:97.2 °F (36.2 °C), Max:97.9 °F (36.6 °C)     0701 -  1900  In: 240 [P.O.:240]  Out: -     Physical Exam:  Constitutional: Ill-appearing male in no acute distress, lying comfortably in the hospital bed. HEENT: Normocephalic and atraumatic. Oropharynx is clear, mucous membranes are moist. +icteric sclera     Lymph node   Deferred   Skin Warm and dry. No bruising and no rash noted. No erythema. No pallor. Respiratory Lungs are clear to auscultation bilaterally without wheezes, rales or rhonchi, normal air exchange without accessory muscle use. CVS Normal rate, regular rhythm and normal S1 and S2. No murmurs, gallops, or rubs. Abdomen Soft, +tenderness to light touch RUQ and nondistended, normoactive bowel sounds. Neuro Drowsy, oriented to person   MSK Normal range of motion in general.  No edema and no tenderness. Psych Appropriate mood and affect.         Labs:      Recent Labs      17   0353  17   6295 10/31/17   2058   WBC  12.4*  8.8  11.2*   RBC  3.06*  2.84*  3.20*   HGB  10.3*  9.7*  10.9*   HCT  30.5*  28.2*  31.9*   MCV  99.7*  99.3*  99.7*   MCH  33.7*  34.2*  34.1*   MCHC  33.8  34.4  34.2   RDW  15.8*  16.0*  15.8*   PLT  149*  140*  149*   GRANS  90*  88*  90*   LYMPH  3*  5*  4*   MONOS  5  6  5   EOS  2  1  1   BASOS  0  0  0   IG  0  0  0   DF  AUTOMATED  AUTOMATED  AUTOMATED   ANEU  11.2*  7.8  10.2*   ABL  0.4*  0.4*  0.5   ABM  0.6  0.5  0.5   DINA  0.2  0.1  0.1   ABB  0.0  0.0  0.0   AIG  0.0  0.0  0.0        Recent Labs      11/02/17   1115  11/02/17   0353  11/01/17   0716  10/31/17   2058  10/31/17   1312   NA   --   142  139   --   137   K   --   4.6  4.8   --   4.9   CL   --   109*  106   --   100   CO2   --   23  22   --   23   AGAP   --   10  11   --   14   GLU   --   101*  66   --   160*   BUN   --   39*  42*   --   42*   CREA   --   0.86  1.19   --   1.54*   GFRAA   --   >60  >60   --   58*   GFRNA   --   >60  >60   --   48*   CA   --   8.1*  8.3   --   9.1   SGOT   --   102*  98*   --   99*   AP   --   545*  484*   --   563*   TP   --   4.9*  4.8*   --   5.9*   ALB   --   1.3*  1.3*   --   1.6*   GLOB   --   3.6*  3.5   --   4.3*   AGRAT   --   0.4*  0.4*   --   0.4*   MG  2.3   --    --   2.1   --    PHOS  3.4   --    --    --    --      Imaging:   ABD LTD [[id number]] Collected: 11/01/17 1014      Order Status: Completed Updated: 11/01/17 1019     Narrative:       Right upper quadrant abdominal ultrasound    History: Jaundice, h/o biliary stricture, metastatic colon cancer, 75 years Male    Comparison: Abdominal ultrasound October 14, 2017, CT torso September 20, 2017. Findings:  The liver is heterogeneous in echotexture and there is no dilatation  of the intrahepatic ducts.  Again visualized is a small hypoechoic mass adjacent  to the right hepatic lobe which may be subcapsular or subserosal measuring 2.2 x  1.4 x 1.4 cm, consistent with a metastatic deposit as seen on prior CT torso.   The liver measures 14 cm in midclavicular length.  Normal hepatopedal flow  within the main portal vein.  The gallbladder is surgically absent. The proximal  common duct is not dilated and measures 7 mm, containing a biliary stent.  The  right kidney is normal appearing and measures 8.3 cm in length.  Pancreas not  well-visualized due to overlying bowel gas. The visualized portions of the IVC  are unremarkable.  And aorta not visualized due to overlying bowel gas.  Small  volume ascites unchanged.  Small right pleural effusion unchanged.       Impression:       Impression:     1.  No significant interval change in hepatic serosal metastatic deposits, small  volume ascites, and small right pleural effusion. 2.  Other chronic findings as above.     XR ABD (KUB) [[id number]] Collected: 11/01/17 0900     Order Status: Completed Updated: 11/01/17 0903     Narrative:       KUB supine views    History:  decreased bowel sounds, abdominal pain, 75 years Male    Comparison:  Chest radiograph October 13, 2016, yesterday    Findings: Biliary stent appears in anatomic position.  No free air is evident. The bowel gas pattern is nonspecific and there is no evidence of ileus or  obstruction.  No suspicious renal or ureteral calculi are evident.  Small right  and trace left pleural effusions relatively unchanged since yesterday. Visualized osseous structures unremarkable.       Impression:       Impression: No acute intra-abdominal pathology identified.             XR CHEST PORT [[id number]] Collected: 10/31/17 1324     Order Status: Completed Updated: 10/31/17 1329     Narrative:       1 View portable chest x-ray 10/31/2017 1:21 PM    Indication: Chest pressure pain. Patient has history of colon cancer. Comparison: CT chest 9/20/2017    Findings: This portable upright AP chest of 1304 shows the lungs somewhat  underventilated. An implanted venous port does remain in place on the left.  The  heart is not significantly enlarged. There is clearly increased reticular and  hazy opacities on the right with the hemidiaphragm and costophrenic angle  obscured. The left lung appears overall still clear.       Impression:       IMPRESSION: Probably there is now a large right pleural effusion with associated  atelectasis.          ASSESSMENT:    Problem List  Date Reviewed: 10/16/2017          Codes Class Noted    Pleural effusion on right ICD-10-CM: J90  ICD-9-CM: 511.9  11/1/2017        Altered mental state ICD-10-CM: R41.82  ICD-9-CM: 780.97  10/31/2017        Failure to thrive (0-17) (Chronic) ICD-10-CM: R62.51  ICD-9-CM: 783.41  10/31/2017        Acute kidney injury [facility]) ICD-10-CM: N17.9  ICD-9-CM: 584.9  10/31/2017        * (Principal)Colon cancer metastasized to [facility]) (Chronic) ICD-10-CM: C18.9, C78.7  ICD-9-CM: 153.9, 197.7  10/31/2017        Hyperbilirubinemia ICD-10-CM: E80.6  ICD-9-CM: 782.4  10/9/2017        Acute liver failure without hepatic coma ICD-10-CM: K72.00  ICD-9-CM: 980  10/9/2017        Other constipation ICD-10-CM: K59.09  ICD-9-CM: 564.09  10/9/2017        Acute liver failure ICD-10-CM: K72.00  ICD-9-CM: 511  10/9/2017        Small bowel obstruction ICD-10-CM: N37.279  ICD-9-CM: 560.9  6/5/2017        Dehydration ICD-10-CM: E86.0  ICD-9-CM: 276.51  6/2/2017        Malignant neoplasm of colon ([address] (Chronic) ICD-10-CM: C18.9  ICD-9-CM: 153.9  10/10/2016        Metastatic colon cancer to [facility]) (Chronic) ICD-10-CM: C18.9, C78.7  ICD-9-CM: 153.9, 197.7  10/10/2016        Hypoxemia ICD-10-CM: R09.02  ICD-9-CM: 799.02  10/10/2016        Hand foot syndrome ICD-10-CM: L27.1  ICD-9-CM: 693.0  7/6/2016        Dysgeusia ICD-10-CM: R43.2  ICD-9-CM: 781.1  6/10/2014        Fatigue ICD-10-CM: R53.83  ICD-9-CM: 780.79  10/29/2013        Urinary urgency ICD-10-CM: R39.15  ICD-9-CM: 788.63  10/26/2012    Overview Signed 10/26/2012  2:16 PM by Ella Guajardo     10-26-12 start flomax                   Mr. Otilio Hinds is 65-year-old white male well known to us. He is a patient of Dr. Cindy Cardenas with history of colon cancer with metastasis to the liver with subsequent liver failure. 10/31 he presented to the emergency department with increasing weakness and confusion over the past several days. [facility] wife reports that he has been incoherent, unable to walk, and having difficulty speaking since last night.  She also reports that he has had worsening jaundice and that his urine has been bloody at times as well as dark yellow. She reports no fever or vomiting.  Over the past 3 days his PO intake has been minimal.  In the past, he has had several lines of chemotherapy with his most recent being Vectibix. He has only received 1 cycle on 10/19/17. He is scheduled to receive his next cycle on 11/2/17. His wife reports that he complained of chest pain earlier today, but upon arriving to the ED he denied chest pain and shortness of breath.     PLAN:  Elevated LFTs/hyperbilirubinemia  - Bilirubin 9.3 today. Seen by GI. Abdominal ultrasound done this morning. No evidence of obstruction (stent in place) thus no indication for ERCP. GI feels due to medications or congestion from intrahepatic mets. Will change rocephin to levaquin and follow  11/2 LFTs improving    UTI/sepsis  - Repeat lactic acid this morning. Continue vanc. Change rocephin to levaquin. Await cultures  11/02 UCNTD. BC from peripheral and port with GNR. Follow for susceptability    Pleural effusion  - Noted on chest xray. Will have pulmonary evaluate for thoracentesis  11/02 Thoracentesis yesterday -1300      AMS  - Likely multifactorial: liver failure, sepsis. Will check CT head to rule out bleed, metastasis, etc  11/02 CT wnl. AMS improving    Colon cancer with liver mets  - S/p cycle 1 vectibix.  Cycle 2 due tomorrow - will hold until clinically improved    Updated spouse and brother on current findings and plan of care  Светлана MENAs  SCDs for DVT prophylaxis  Consult PT/OT [facility] NP   [facility]  [address] [address]  Office : [phone]  Fax : [phone]           Attending Addendum:  I personally evaluated the patient with Tucker Plaza N.P.,  and agree with the assessment, findings and plan as documented. Continue ABX for GM negative rods in blood cultures.               Raúl Sanz MD    [address] [address]  Office : [phone]  Fax : [phone]

## 2017-11-02 NOTE — PROGRESS NOTES
Harshad Hubbard  Admission Date: 10/31/2017             Daily Progress Note: 11/2/2017    The patient's chart is reviewed and the patient is discussed with the staff. Patient is a 75 y.o.  male presents with metastatic colon cancer with liver failure. He was admitted with increased confusion and weakness. He has received chemotherapy most recently on 10/19/2017 and plans for next cycle 11/2/2017. Treatment is currently on hold. GI was consulted for elevated LFTS. CXR obtained showing large RIGHT effusion and we were asked to evaluate. 1300 drained from R          Subjective:      Had thoracentesis yesterday  Lethargic  Says he does not want to be\" harassed \"      Current Facility-Administered Medications   Medication Dose Route Frequency    vancomycin (VANCOCIN) 1500 mg in  ml infusion  1,500 mg IntraVENous Q18H    levoFLOXacin (LEVAQUIN) 500 mg in D5W IVPB  500 mg IntraVENous Q24H    docusate sodium (COLACE) capsule 100 mg  100 mg Oral BID PRN    magnesium oxide (MAG-OX) tablet 800 mg  800 mg Oral BID    meloxicam (MOBIC) tablet 7.5 mg  7.5 mg Oral BID    multivitamin, tx-iron-ca-min (THERA-M w/ IRON) tablet 1 Tab  1 Tab Oral DAILY    pantoprazole (PROTONIX) tablet 40 mg  40 mg Oral ACB    pyridoxine (vitamin B6) (VITAMIN B-6) tablet 50 mg  50 mg Oral DAILY    traMADol (ULTRAM) tablet 50 mg  50 mg Oral Q6H PRN    ondansetron (ZOFRAN) injection 4 mg  4 mg IntraVENous Q6H PRN    0.9% sodium chloride infusion  125 mL/hr IntraVENous CONTINUOUS     Facility-Administered Medications Ordered in Other Encounters   Medication Dose Route Frequency    saline peripheral flush soln 10 mL  10 mL InterCATHeter PRN       Review of Systems    Constitutional: negative for fever, chills, sweats  Cardiovascular: negative for chest pain, palpitations, syncope, edema  Gastrointestinal:  negative for dysphagia, reflux, vomiting, diarrhea, abdominal pain, or melena  Neurologic:  negative for focal weakness, numbness, headache    Objective:     Vitals:    11/01/17 2342 11/02/17 0030 11/02/17 0352 11/02/17 0734   BP: 125/63  127/58 129/68   Pulse: 80  81 93   Resp: 20  18 18   Temp: 97.4 °F (36.3 °C)  97.7 °F (36.5 °C) 97.2 °F (36.2 °C)   SpO2: 99%  99% 99%   Weight:  176 lb 4.8 oz (80 kg)     Height:         Intake and Output:   10/31 1901 - 11/02 0700  In: 6006 [P.O.:420; I.V.:4311]  Out: 325 [Urine:325]  11/02 0701 - 11/02 1900  In: 240 [P.O.:240]  Out: -     Physical Exam:   Constitution:  the patient is well developed and in no acute distress  EENMT:  Sclera clear, pupils equal, oral mucosa moist  Respiratory: coarse  Cardiovascular:  RRR without M,G,R  Gastrointestinal: soft and-tender to minimal touch ; with positive bowel sounds. Musculoskeletal: warm without cyanosis. There is no lower leg edema. Skin:  + jaundice or rashes, no wounds   Neurologic: no gross neuro deficits     Psychiatric:  alert and oriented x 3    CXR:       LAB  No results for input(s): GLUCPOC in the last 72 hours. No lab exists for component: [place]   Recent Labs      11/02/17   0353  11/01/17   0721  11/01/17   0716  10/31/17   2058  10/31/17   1312   WBC  12.4*   --   8.8  11.2*  13.8*   HGB  10.3*   --   9.7*  10.9*  11.3*   HCT  30.5*   --   28.2*  31.9*  32.9*   PLT  149*   --   140*  149*  171   INR   --   1.5*   --    --    --      Recent Labs      11/02/17   0353  11/01/17   0716  10/31/17   2058  10/31/17   1312   NA  142  139   --   137   K  4.6  4.8   --   4.9   CL  109*  106   --   100   CO2  23  22   --   23   GLU  101*  66   --   160*   BUN  39*  42*   --   42*   CREA  0.86  1.19   --   1.54*   MG   --    --   2.1   --    CA  8.1*  8.3   --   9.1   ALB  1.3*  1.3*   --   1.6*   TBILI  8.7*  9.3*   --   10.9*   ALT  63  62   --   66*   SGOT  102*  98*   --   99*     No results for input(s): PH, PCO2, PO2, HCO3 in the last 72 hours.   Recent Labs      11/01/17   1418  10/31/17   2058   LAC  3.3*  4.7* Assessment:  (Medical Decision Making)     Hospital Problems  Date Reviewed: 10/16/2017          Codes Class Noted POA    Pleural effusion on right 1300 cc drained- exudate  Cytology pending  ICD-10-CM: J90  ICD-9-CM: 511.9  11/1/2017 Yes        Altered mental state ICD-10-CM: R41.82  ICD-9-CM: 780.97  10/31/2017 Yes        Failure to thrive (0-17) (Chronic) ICD-10-CM: R62.51  ICD-9-CM: 783.41  10/31/2017 Yes        Acute kidney injury [facility]) ICD-10-CM: N17.9  ICD-9-CM: 584.9  10/31/2017 Yes        * (Principal)Colon cancer metastasized to liver [facility]) (Chronic) ICD-10-CM: C18.9, C78.7  ICD-9-CM: 153.9, 197.7  10/31/2017 Yes        Acute liver failure ICD-10-CM: K72.00  ICD-9-CM: 996  10/9/2017 Yes        Hypoxemia ICD-10-CM: R09.02  ICD-9-CM: 799.02  10/10/2016 Yes              Plan:  (Medical Decision Making)     --fluid exudative- cytology pending -/ if malignant     More than 50% of the time documented was spent in face-to-face contact with the patient and in the care of the patient on the floor/unit where the patient is located.     Reji Poole MD

## 2017-11-02 NOTE — PROGRESS NOTES
's follow-up visit. Mr. Brendan Alaniz was receiving care from staff. Provided 's card for sharing with patient.        Rom Marti 67  Board Certified

## 2017-11-02 NOTE — PROGRESS NOTES
GI DAILY PROGRESS NOTE    Admit Date:  10/31/2017    Today's Date:  11/2/2017    CC:  cholestasis    Subjective:     Patient is more alert. Feels weak but otherwise ok. Medications:   Current Facility-Administered Medications   Medication Dose Route Frequency    vancomycin (VANCOCIN) 1500 mg in  ml infusion  1,500 mg IntraVENous Q18H    levoFLOXacin (LEVAQUIN) 500 mg in D5W IVPB  500 mg IntraVENous Q24H    docusate sodium (COLACE) capsule 100 mg  100 mg Oral BID PRN    magnesium oxide (MAG-OX) tablet 800 mg  800 mg Oral BID    meloxicam (MOBIC) tablet 7.5 mg  7.5 mg Oral BID    multivitamin, tx-iron-ca-min (THERA-M w/ IRON) tablet 1 Tab  1 Tab Oral DAILY    pantoprazole (PROTONIX) tablet 40 mg  40 mg Oral ACB    prochlorperazine (COMPAZINE) tablet 10 mg  10 mg Oral Q6H PRN    pyridoxine (vitamin B6) (VITAMIN B-6) tablet 50 mg  50 mg Oral DAILY    traMADol (ULTRAM) tablet 50 mg  50 mg Oral Q6H PRN    ondansetron (ZOFRAN) injection 4 mg  4 mg IntraVENous Q6H PRN    0.9% sodium chloride infusion  125 mL/hr IntraVENous CONTINUOUS     Facility-Administered Medications Ordered in Other Encounters   Medication Dose Route Frequency    saline peripheral flush soln 10 mL  10 mL InterCATHeter PRN         Objective:   Vitals:  Visit Vitals    /68 (BP 1 Location: Left arm, BP Patient Position: At rest)    Pulse 93    Temp 97.2 °F (36.2 °C)    Resp 18    Ht 5' 8\" (1.727 m)    Wt 80 kg (176 lb 4.8 oz)    SpO2 99%    BMI 26.81 kg/m2     Intake/Output:     10/31 1901 - 11/02 0700  In: 0668 [P.O.:420;  I.V.:4311]  Out: 325 [Urine:325]  Exam:  General appearance: alert, cooperative, no distress  Neuro:  alert and oriented    Data Review (Labs):    Recent Labs      11/02/17   0353  11/01/17   0721  11/01/17   0716  10/31/17   2058  10/31/17   1312   WBC  12.4*   --   8.8  11.2*  13.8*   HGB  10.3*   --   9.7*  10.9*  11.3*   HCT  30.5*   --   28.2*  31.9*  32.9*   PLT  149*   --   140*  149*  171 MCV  99.7*   --   99.3*  99.7*  99.7*   NA  142   --   139   --   137   K  4.6   --   4.8   --   4.9   CL  109*   --   106   --   100   CO2  23   --   22   --   23   BUN  39*   --   42*   --   42*   CREA  0.86   --   1.19   --   1.54*   CA  8.1*   --   8.3   --   9.1   MG   --    --    --   2.1   --    GLU  101*   --   66   --   160*   AP  545*   --   484*   --   563*   SGOT  102*   --   98*   --   99*   ALT  63   --   62   --   66*   TBILI  8.7*   --   9.3*   --   10.9*   CBIL   --    --   7.9*   --    --    ALB  1.3*   --   1.3*   --   1.6*   TP  4.9*   --   4.8*   --   5.9*   PTP   --   16.3*   --    --    --    INR   --   1.5*   --    --    --        Assessment:     Principal Problem:    Colon cancer metastasized to liver (HCC) (10/31/2017)    Active Problems:    Hypoxemia (10/10/2016)      Acute liver failure (10/9/2017)      Altered mental state (10/31/2017)      Failure to thrive (0-17) (10/31/2017)      Acute kidney injury ([address] (10/31/2017)      Pleural effusion on right (11/1/2017)    Intrahepatic cholestasis-Rocephin has been changed. Liver tests are stable. Cholestasis can take weeks or even months to resolve after removal of offending agent. Plan:     Follow liver tests. I am discontinuing the Compazine, which he has not been receiving, because it can cause cholestasis. We are following loosely for now.     Lydia Knott MD

## 2017-11-02 NOTE — PROGRESS NOTES
Problem: Nutrition Deficit  Goal: *Optimize nutritional status  Nutrition  Reason for assessment: Consult for \"PPN\" management per nutritional support protocols (Hematology and Oncology)  Verbal order for \"TPN\" after discussing with Adrienne [place] NP  Assessment:   Diet order(s): Regular than CLQ 10/31-present  Food/Nutrition History:  Patient with h/o colon cancer mets to liver with chemotherapy treatment on hold (last cycle 10/19, due today). He is admitted with AMS and abnormal LFTs, MICHAEL, and FTT with a reported poor oral intake for ~3 days PTA. KUB yesterday negative for obstruction and/or ileus. Noted abdomen as distended, date of last BM 10/30. He did have a biliary stent placed earlier last month d/t CHD stricture. Central line access : Port-Per [facility] [place], port is functional and working this afternoon. Plan to start TPN today. Due to TPN shortage, consider feeding patient enterally with low residue formula via NJT. Pertinent Medications: 800 mg Magnesium BID, levaquin, vancomycin, MVI, B6  IVF: NS 125ml/hr  Pertinent Labs: Na 142, Cl 109, C02 23; K 4.6; Phos 3.4, Mg 2.3; Triglyceride 0  T bili trending down to 8.7, Cca 10.26  Anthropometrics:Height: 5' 8\" (172.7 cm), Weight Source: Standing scale (comment), Weight: 80 kg (176 lb 4.8 oz), Body mass index is 26.81 kg/(m^2). Johanne Fatima BMI class of normal weight. Edema: Trace  Macronutrient needs:  EER:  3065-2421 kcal /day (20-25 kcal/kg listed BW)  EPR:  70-91 grams protein/day (1-1.3 grams/kg IBW)  Max CHO:  403 grams/day (4mg/kg IBW/min)  Fluid:  1ml/kcal  Intake/Comparative Standards: Current CLQ diet does not meet kcal or protein needs    Nutrition Diagnosis: Inadequate oral intake r/t inability to consume sufficient oral intake as evidenced by patient on clear liquid diet, inadequate in kcal/protein.   Intervention:   Meals and snacks: CLQ  Nutritional Supplement Therapy: Electrolyte replacement per nutritional support protocols are active on MAR.  PN/IVF:   1. Start TPN: 15%DEX/ 5%AA at 45 ml/hr with 500 ml 20% Lipids daily provides  1710 kcal/d (100% of needs), 50 grams of protein/d (71% of needs), 150 grams of CHO/d (does not exceed maximum CHO load) and 2000 ml of total volume/d ( 100% of needs). 1. Additives include: 120 meq NaCl, 10 meq KP04, 8 meq Mg, no calcium and include MVI  2. Discontinue IVF with initiation of TPN  3. Discontinue oral magnesium and MVI. 4. Add TPN labs, POC Glucoses/SSI if Glucose > 180 mg/dl on AM BMP. EN: Consider placement of NJT for enteral nutrition. Coordination of Nutrition Care: Yolie, RN-verified access available for TPN; Jair Gomez NP-discussed shortage of TPN and initiation of enteral nutrition with NJT, also received order for TPN vs PPN. Discharge nutrition plan:  Too soon to determine                          Yazmin Viramontes Carl[address], [address], [phone]

## 2017-11-02 NOTE — PROGRESS NOTES
Problem: Self Care Deficits Care Plan (Adult)  Goal: *Acute Goals and Plan of Care (Insert Text)  1. Patient will complete lower body bathing and dressing with SBA and adaptive equipment as needed. 2. Patient will complete toileting with SBA. 3. Patient will tolerate 23 minutes of OT treatment with less than 3 rest breaks to increase activity tolerance for ADLs. 4. Patient will complete functional transfers with SBA and adaptive equipment as needed. 5. Patient will be oriented to person, place, situation, and time with 100% accuracy on 3 of 3 attempts. Timeframe: 7 visits     Comments:     OCCUPATIONAL THERAPY: Initial Assessment and PM 11/2/2017  INPATIENT: Hospital Day: 3  [place] / [place] / Product Type: Medicare /      NAME/AGE/GENDER: Mart Villalta is a 75 y.o. male   PRIMARY DIAGNOSIS:  Pleural effusion [J90] Colon cancer metastasized to liver [facility]) [facility]  Procedure(s) (LRB):  THORACENTESIS (N/A)  ULTRASOUND (Right)  1 Day Post-Op  ICD-10: Treatment Diagnosis:    · Generalized Muscle Weakness (M62.81)  · Other lack of cordination (R27.8)   Precautions/Allergies:     Oxaliplatin      ASSESSMENT:     Mr. Paulino Lopez presents to hospital for above. Pt lives with his wife in a two-story home, there is 1 step to enter from the garage and 10-12 interior steps. He is typically independent with ADLs/IADLs, including driving and working as an . Today, pt is drowsy and oriented to person and situation, disoriented to place and time. He appears confused at times throughout evaluation as noted by perseverating with conversation and mobility. He also presents with decreased concentration/attention, safety awareness, insight to deficits, awareness to need for safety/assistance. Pt requires min A for bed mobility for boost up, demonstrating intact sitting balance at EOB.  He completes STS and transfer to toilet with CGA, demonstrating fair static/dynamic sitting balance. Pt requires min A with toileting and CGA for hand hygiene at sink for balance. Mr. Yuli Short is functioning well below his baseline and will require continued skilled OT services to maximize safety and independence with ADLs. Will follow. This section established at most recent assessment   PROBLEM LIST (Impairments causing functional limitations):  1. Decreased Strength  2. Decreased ADL/Functional Activities  3. Decreased Transfer Abilities  4. Decreased Ambulation Ability/Technique  5. Decreased Balance  6. Increased Pain  7. Decreased Activity Tolerance  8. Decreased Work Simplification/Energy Conservation Techniques  9. Increased Fatigue  10. Decreased Knowledge of Precautions  11. Decreased [place] with Home Exercise Program  12. Decreased Cognition   INTERVENTIONS PLANNED: (Benefits and precautions of occupational therapy have been discussed with the patient.)  1. Activities of daily living training  2. Adaptive equipment training  3. Balance training  4. Clothing management  5. Cognitive training  6. Community reintergration  7. Donning&doffing training  8. Group therapy  9. Therapeutic activity  10. Therapeutic exercise  11. Work reintegration     TREATMENT PLAN: Frequency/Duration: Follow patient 3x/week to address above goals. Rehabilitation Potential For Stated Goals: Fair     RECOMMENDED REHABILITATION/EQUIPMENT: (at time of discharge pending progress): Due to the probability of continued deficits (see above) this patient will likely need continued skilled occupational therapy after discharge. Equipment:    None at this time              OCCUPATIONAL PROFILE AND HISTORY:   History of Present Injury/Illness (Reason for Referral):  See H&P  Past Medical History/Comorbidities:   Mr. Yuli Short  has a past medical history of Cancer ([facility] (2012); Colon cancer ([facility] [address] GERD (gastroesophageal reflux disease); History of anemia; Hypertension;  Other ill-defined conditions(799.89); Psychiatric disorder; PTSD (post-traumatic stress disorder); PUD (peptic ulcer disease); Ulcer ([facility] (2008); and Unspecified sleep apnea. He also has no past medical history of Difficult intubation; Malignant hyperthermia due to anesthesia; Nausea & vomiting; or Pseudocholinesterase deficiency. Mr. Miles Yoo  has a past surgical history that includes egd deliver thermal energy sphnctr/cardia gerd (2008); colonoscopy (N/A, 9/13/2016); vascular access; colonoscopy; heent (1980s); tonsillectomy; and flexible sigmoidoscopy (N/A, 6/9/2017). Social History/Living Environment:   Home Environment: Private residence  # Steps to Enter: 1  One/Two Story Residence: Two story  # of Interior Steps: 12  Lift Chair Available: No  Living Alone: No  Support Systems: Spouse/Significant Other/Partner, Friends \ neighbors, Family member(s)  Patient Expects to be Discharged to[de-identified] Unknown  Current DME Used/Available at Home: Cane, straight  Prior Level of Function/Work/Activity:  [place] with ADLs/IADLs  Personal Factors:          Sex:  male        Age:  75 y.o. Profession:      Number of Personal Factors/Comorbidities that affect the Plan of Care: Expanded review of therapy/medical records (1-2):  MODERATE COMPLEXITY   ASSESSMENT OF OCCUPATIONAL PERFORMANCE[de-identified]   Activities of Daily Living:           Basic ADLs (From Assessment) Complex ADLs (From Assessment)   Basic ADL  Feeding: Setup  Oral Facial Hygiene/Grooming: Minimum assistance  Bathing: Moderate assistance  Upper Body Dressing: Setup  Lower Body Dressing:  Moderate assistance  Toileting: Minimum assistance Instrumental ADL  Meal Preparation: Moderate assistance  Homemaking: Maximum assistance  Medication Management: Total assistance  Financial Management: Total assistance   Grooming/Bathing/Dressing Activities of Daily Living     Cognitive Retraining  Safety/Judgement: Fall prevention;Home safety;Decreased awareness of need for safety;Decreased insight into deficits; Decreased awareness of need for assistance                 Functional Transfers  Toilet Transfer : Contact guard assistance     Bed/Mat Mobility  Supine to Sit: Contact guard assistance;Minimum assistance  Sit to Stand: Contact guard assistance       Most Recent Physical Functioning:   Gross Assessment:  AROM: Within functional limits  PROM: Within functional limits  Strength: Generally decreased, functional  Coordination: Generally decreased, functional  Tone: Normal  Sensation: Intact               Posture:     Balance:  Sitting: Intact  Standing: Impaired  Standing - Static: Fair  Standing - Dynamic : Fair Bed Mobility:  Supine to Sit: Contact guard assistance;Minimum assistance  Wheelchair Mobility:     Transfers:  Sit to Stand: Contact guard assistance  Stand to Sit: Contact guard assistance                Patient Vitals for the past 6 hrs:   BP BP Patient Position SpO2 Pulse   11/02/17 1121 116/70 Supine 96 % 92       Mental Status  Neurologic State: Confused, Drowsy  Orientation Level: Oriented to person, Disoriented to place, Disoriented to time, Oriented to situation  Cognition: Decreased attention/concentration, Follows commands, Poor safety awareness  Perception: Appears intact  Perseveration: No perseveration noted  Safety/Judgement: Fall prevention, Home safety, Decreased awareness of need for safety, Decreased insight into deficits, Decreased awareness of need for assistance                          Physical Skills Involved:  1. Range of Motion  2. Balance  3. Strength  4. Activity Tolerance  5. Gross Motor Control Cognitive Skills Affected (resulting in the inability to perform in a timely and safe manner):  1. Executive Function  2. Immediate Memory  3. Short Term Recall  4. Sustained Attention  5. Divided Attention  6. Expression Psychosocial Skills Affected:  1. Habits/Routines  2. Environmental Adaptation  3. Social Interaction  4.  Social Roles   Number of elements that affect the Plan of Care: 5+:  HIGH COMPLEXITY   CLINICAL DECISION MAKIN[address] [place] [id number] AM-PAC 6 Clicks   Daily Activity Inpatient Short Form  How much help from another person does the patient currently need. .. Total A Lot A Little None   1. Putting on and taking off regular lower body clothing? [] 1   [x] 2   [] 3   [] 4   2. Bathing (including washing, rinsing, drying)? [] 1   [x] 2   [] 3   [] 4   3. Toileting, which includes using toilet, bedpan or urinal?   [] 1   [] 2   [x] 3   [] 4   4. Putting on and taking off regular upper body clothing? [] 1   [] 2   [] 3   [x] 4   5. Taking care of personal grooming such as brushing teeth? [] 1   [] 2   [x] 3   [] 4   6. Eating meals? [] 1   [] 2   [] 3   [x] 4   © , Trustees of [address] [place] [id number], under license to [employer]. All rights reserved      Score:  Initial: 18 Most Recent: X (Date: -- )    Interpretation of Tool:  Represents activities that are increasingly more difficult (i.e. Bed mobility, Transfers, Gait). Score 24 23 22-20 19-15 14-10 9-7 6     Modifier CH CI CJ CK CL CM CN      ? Self Care:     - CURRENT STATUS: CK - 40%-59% impaired, limited or restricted    - GOAL STATUS: CJ - 20%-39% impaired, limited or restricted    - D/C STATUS:  ---------------To be determined---------------  [place] / [place] / Product Type: Medicare /      Medical Necessity:     · Patient is expected to demonstrate progress in strength, balance, coordination and functional technique to decrease assistance required with ADLs. Reason for Services/Other Comments:  · Patient continues to require skilled intervention due to medical complications and patient unable to attend/participate in therapy as expected.    Use of outcome tool(s) and clinical judgement create a POC that gives a: MODERATE COMPLEXITY         TREATMENT:   (In addition to Assessment/Re-Assessment sessions the following treatments were rendered)     Pre-treatment Symptoms/Complaints:    Pain: Initial:   Pain Intensity 1: 0  Post Session:  Same      Assessment/Reassessment only, no treatment provided today    Braces/Orthotics/Lines/Etc:   · IV  · O2 Device: Room air  Treatment/Session Assessment:    · Response to Treatment:  eval only   · Interdisciplinary Collaboration:   o Physical Therapist  o Occupational Therapist  o Registered Nurse  · After treatment position/precautions:   o working with PT   · Compliance with Program/Exercises: will assess as treatment progresses. · Recommendations/Intent for next treatment session: \"Next visit will focus on advancements to more challenging activities and reduction in assistance provided\".   Total Treatment Duration:  OT Patient Time In/Time Out  Time In: 1323  Time Out: [address]

## 2017-11-03 NOTE — PROGRESS NOTES
Pharmacokinetic Consult to Pharmacist    Gerald Roy is a 75 y.o. male being treated for sepsis with vancomycin and levaquin. Height: 5' 8\" (172.7 cm)  Weight: 82.3 kg (181 lb 8 oz)  Lab Results   Component Value Date/Time    BUN 39 11/03/2017 03:47 AM    Creatinine 0.93 11/03/2017 03:47 AM    WBC 16.8 11/03/2017 03:47 AM    Lactic acid 3.3 11/01/2017 02:18 PM    Lactic Acid (POC) 3.5 10/31/2017 04:36 PM      Estimated Creatinine Clearance: 79.6 mL/min (based on Cr of 0.93). Lab Results   Component Value Date/Time    Vancomycin,trough 12.1 11/02/2017 09:40 PM       Day 4 of vancomycin. Goal trough is 15-20. Change to 1g q12h with next dose at 1600. Will continue to follow patient. Thank you,  Barbara Castro, Pharm. D.   Clinical Pharmacist  [phone]

## 2017-11-03 NOTE — PROGRESS NOTES
END OF SHIFT NOTE:    Intake/Output      Voiding: YES  Catheter: NO  Drain:              Stool:  0 occurrences. Emesis:  0 occurrences. VITAL SIGNS  Patient Vitals for the past 12 hrs:   Temp Pulse Resp BP SpO2   11/03/17 1444 97.4 °F (36.3 °C) (!) 112 18 120/68 97 %   11/03/17 1113 98.5 °F (36.9 °C) (!) 103 18 151/73 93 %       Pain Assessment  Pain 1  Pain Scale 1: Visual (11/03/17 1920)  Pain Intensity 1: 0 (11/03/17 1920)  Patient Stated Pain Goal: 0 (11/03/17 0900)  Pain Reassessment 1: Patient sleeping (11/03/17 0439)  Pain Onset 1:  (left leg seemed to cause him a lot of pain with movement.) (11/03/17 1349)  Pain Location 1: Leg (11/03/17 1349)  Pain Description 1: Constant (11/03/17 0409)  Pain Intervention(s) 1: Emotional support;Nurse notified (11/03/17 1349)    Ambulating  No    Additional Information:     Shift report given to oncoming nurse at the bedside.     Annemarie Quiñones, RN

## 2017-11-03 NOTE — PROGRESS NOTES
Mariza Munoz  Admission Date: 10/31/2017             Daily Progress Note: 11/3/2017    The patient's chart is reviewed and the patient is discussed with the staff. 75 y.o. CM presents with metastatic colon cancer with liver failure with abdominal pain. He was admitted with increased confusion and weakness and feeling poorly for the last month. He has received chemotherapy most recently on 10/19/2017 and plans for next cycle 11/2/2017. Treatment is currently on hold. GI was consulted for elevated LFTS. CXR obtained showing large RIGHT effusion. Right tap with 1300ml removed. He is on 3L NC. Subjective:     Lying in bed, weak voice but denies pain or shortness of breath . On room air, no significant cough. His brother, an ER physician\" is at the beside.     Current Facility-Administered Medications   Medication Dose Route Frequency    TPN ADULT-CENTRAL - dextrose 15% amino acid 5%   IntraVENous QPM    cefepime (MAXIPIME) 2 g in 0.9% sodium chloride (MBP/ADV) 100 mL  2 g IntraVENous Q8H    fat emulsion 20% (LIPOSYN, INTRAlipid) infusion 500 mL  500 mL IntraVENous QPM    TPN ADULT-CENTRAL - dextrose 15% amino acid 5%   IntraVENous QPM    NUTRITIONAL SUPPORT ELECTROLYTE PRN ORDERS   Does Not Apply PRN    levoFLOXacin (LEVAQUIN) 500 mg in D5W IVPB  500 mg IntraVENous Q24H    docusate sodium (COLACE) capsule 100 mg  100 mg Oral BID PRN    meloxicam (MOBIC) tablet 7.5 mg  7.5 mg Oral BID    pantoprazole (PROTONIX) tablet 40 mg  40 mg Oral ACB    pyridoxine (vitamin B6) (VITAMIN B-6) tablet 50 mg  50 mg Oral DAILY    traMADol (ULTRAM) tablet 50 mg  50 mg Oral Q6H PRN    ondansetron (ZOFRAN) injection 4 mg  4 mg IntraVENous Q6H PRN     Facility-Administered Medications Ordered in Other Encounters   Medication Dose Route Frequency    saline peripheral flush soln 10 mL  10 mL InterCATHeter PRN       Review of Systems  Constitutional: negative for fever, chills, sweats  Cardiovascular: generalized upper and lower extremity edema, negative for chest pain, palpitations, syncope  Gastrointestinal:  negative for dysphagia, reflux, vomiting, diarrhea, abdominal pain, or melena  Neurologic:  negative for focal weakness, numbness, headache    Objective:     Vitals:    11/03/17 0346 11/03/17 0718 11/03/17 1113 11/03/17 1444   BP: 136/72 125/74 151/73 120/68   Pulse: (!) 105 98 (!) 103 (!) 112   Resp: 18 18 18 18   Temp: 97.8 °F (36.6 °C) 98.5 °F (36.9 °C) 98.5 °F (36.9 °C) 97.4 °F (36.3 °C)   SpO2: 94% 95% 93% 97%   Weight:       Height:         Intake and Output:   11/01 1901 - 11/03 0700  In: 6107 [P.O.:720; I.V.:3483]  Out: 200 [Urine:200]  11/03 0701 - 11/03 1900  In: -   Out: 200 [Urine:200]    Physical Exam:   Constitution:  the patient is well developed and in no acute distress, room air, sat 97%  EENMT:  Sclera clear, pupils equal, oral mucosa moist  Respiratory: few anterior crackles, diminished right base, no wheezing  Cardiovascular:  RRR without M,G,R  Gastrointestinal: soft and non-tender; with positive bowel sounds. Musculoskeletal: warm without cyanosis. There is 2+ bilateral lower leg edema, upper extremity edema. Skin:  no jaundice or rashes, no open wounds   Neurologic: no gross neuro deficits     Psychiatric:  alert and oriented x 2    CXR: None today    CXR 11/2/17:        LAB   11/1/2017 16:15   BODY FLUID TYPE RIGHT pleural   FLUID APPEARANCE HAZY   FLUID COLOR YELLOW   FLUID RBC CT. <50977   FLUID WBC COUNT 1333   FLD NEUTROPHILS 100   Protein total, body fld. 3.1   Glucose, body fld. 50   LD, body fld. 237       No results for input(s): GLUCPOC in the last 72 hours.     No lab exists for component: [place]   Recent Labs      11/03/17   0347  11/02/17   0353  11/01/17   0721  11/01/17   0716  10/31/17   2058   WBC  16.8*  12.4*   --   8.8  11.2*   HGB  10.3*  10.3*   --   9.7*  10.9*   HCT  30.4*  30.5*   --   28.2*  31.9*   PLT  150  149*   --   140*  149* INR   --    --   1.5*   --    --      Recent Labs      11/03/17   0347  11/02/17   1115  11/02/17   0353  11/01/17   0716  10/31/17   2058   NA  140   --   142  139   --    K  4.4   --   4.6  4.8   --    CL  109*   --   109*  106   --    CO2  21   --   23  22   --    GLU  146*   --   101*  66   --    BUN  39*   --   39*  42*   --    CREA  0.93   --   0.86  1.19   --    MG  2.4  2.3   --    --   2.1   CA  8.5   --   8.1*  8.3   --    PHOS  2.9  3.4   --    --    --    ALB  1.2*   --   1.3*  1.3*   --    TBILI  8.2*   --   8.7*  9.3*   --    ALT  58   --   63  62   --    SGOT  87*   --   102*  98*   --      No results for input(s): PH, PCO2, PO2, HCO3 in the last 72 hours. Recent Labs      11/01/17   1418  10/31/17   2058   LAC  3.3*  4.7*         Assessment:  (Medical Decision Making)     Hospital Problems  Date Reviewed: 11/3/2017          Codes Class Noted POA    Pleural effusion on right ICD-10-CM: J90  ICD-9-CM: 511.9  11/1/2017 Yes     11/1/17Right thoracentesis:  1300 cc of bright yellow fluid removed         No malignant cells    Altered mental state ICD-10-CM: R41.82  ICD-9-CM: 780.97  10/31/2017 Yes    simple responses    Failure to thrive (0-17) (Chronic) ICD-10-CM: R62.51  ICD-9-CM: 783.41  10/31/2017 Yes    unchanged    Acute kidney injury [facility]) ICD-10-CM: N17.9  ICD-9-CM: 584.9  10/31/2017 Yes    BUN 39--creatinine normal ranges    * (Principal)Colon cancer metastasized to liver (HCC) (Chronic) ICD-10-CM: C18.9, C78.7  ICD-9-CM: 153.9, 197.7  10/31/2017 Yes    Per oncology    Acute liver failure ICD-10-CM: K72.00  ICD-9-CM: 958  10/9/2017 Yes    GI followed but signed off for OP follow up.     Hypoxemia ICD-10-CM: R09.02  ICD-9-CM: 799.02  10/10/2016 Yes    Resolved--on room air          Plan:  (Medical Decision Making)     --Pathology on pleural fluid with no malignant cells  --Maxipime day 1, Levaquin day 3  --Blood cultures 10/31:  2/2 GNR-pending  --Pleural fluid culture:  No growth 1 day-pending  --TPN for nutritional support    More than 50% of the time documented was spent in face-to-face contact with the patient and in the care of the patient on the floor/unit where the patient is located. Edwar Ogden NP     Lungs: Diminished breath sound in the bases  Heart:  RRR with no Murmur/Rubs/Gallops    Additional Comments: We will continue current antibiotic pending his culture results. We will continue his TPN. Patient overall is groggy but answer questions. Per oncology for other recommendation. This patient is stable from a pulmonary standpoint. We have nothing to add to management at this time. Please call if we need to see again. I have spoken with and examined the patient. I agree with the above assessment and plan as documented.     Deon Montanez MD

## 2017-11-03 NOTE — PROGRESS NOTES
Problem: Mobility Impaired (Adult and Pediatric)  Goal: *Acute Goals and Plan of Care (Insert Text)  Discharge Goals:  (1.)Mr. Rodriguez will move from supine to sit and sit to supine , scoot up and down and roll side to side with INDEPENDENT within 7 day(s). (2.)Mr. Rodriguez will transfer from bed to chair and chair to bed with INDEPENDENT using the least restrictive device within 7 day(s). (3.)Mr. Rodriguez will ambulate with SUPERVISION for 500+ feet with the least restrictive device within 7 day(s). (4.)Mr. Rodriguez will ascend/descend 1 flight of stairs with use of 1 railing with STAND BY ASSIST within 7 days. ________________________________________________________________________________________________      PHYSICAL THERAPY: Daily Note, Treatment Day: 1st, PM 11/3/2017  INPATIENT: Hospital Day: 4  [place] / [place] / Product Type: Medicare /      NAME/AGE/GENDER: Elvira Pugh is a 75 y.o. male   PRIMARY DIAGNOSIS: Pleural effusion [J90] Colon cancer metastasized to liver [facility]) [facility]  Procedure(s) (LRB):  THORACENTESIS (N/A)  ULTRASOUND (Right)  2 Days Post-Op  ICD-10: Treatment Diagnosis:   · Generalized Muscle Weakness (M62.81)  · Difficulty in walking, Not elsewhere classified (R26.2)   Precaution/Allergies:  Oxaliplatin      ASSESSMENT:     Mr. Joseph Heidi sleeping on arrival.  Tried to get him to wake up. Washed his face. Tried to get him to wash his face just to wake him up. He would not do anything with the washcloth. Tried to get him moved to the edge of the bed to wake him up however he resisted and winced in pain when moved. He could not tell me where he was hurting and when not touched he looks very peaceful. Tried to do range of motion and the face of pain returned when left leg was ranged. All this time he would not answer questions, open his eyes but then close them right away. Treatment ended.   Of note OT seemed to have a great session with him earlier. This section established at most recent assessment   PROBLEM LIST (Impairments causing functional limitations):  1. Decreased Strength  2. Decreased ADL/Functional Activities  3. Decreased Transfer Abilities  4. Decreased Ambulation Ability/Technique  5. Decreased Balance  6. Increased Pain  7. Decreased Activity Tolerance  8. Decreased Pacing Skills  9. Increased Fatigue  10. Decreased [place] with Home Exercise Program   INTERVENTIONS PLANNED: (Benefits and precautions of physical therapy have been discussed with the patient.)  1. Balance Exercise  2. Bed Mobility  3. Family Education  4. Gait Training  5. Home Exercise Program (HEP)  6. Neuromuscular Re-education/Strengthening  7. Range of Motion (ROM)  8. Therapeutic Exercise/Strengthening  9. Transfer Training  10. Group Therapy     TREATMENT PLAN: Frequency/Duration: 3 times a week for duration of hospital stay  Rehabilitation Potential For Stated Goals: [place] REHABILITATION/EQUIPMENT: (at time of discharge pending progress): Due to the probability of continued deficits (see above) this patient will likely need continued skilled physical therapy after discharge. Equipment:    None at this time              HISTORY:   History of Present Injury/Illness (Reason for Referral):  See H&P below  Mr. Shellie Sampson is 68-year-old white male well known to us. He is a patient of Dr. Tonya Calderon with history of colon cancer with metastasis to the liver with subsequent liver failure. Today he presented to the emergency department with increasing weakness and confusion over the past several days. [facility] wife reports that he has been incoherent, unable to walk, and having difficulty speaking since last night.  She also reports that he has had worsening jaundice and that his urine has been bloody at times as well as dark yellow.   She reports no fever or vomiting.  Over the past 3 days his PO intake has been minimal.  In the past, he has had several lines of chemotherapy with his most recent being Vectibix. He has only received 1 cycle on 10/19/17. He is scheduled to receive his next cycle on 11/2/17. His wife reports that he complained of chest pain earlier today, but upon arriving to the ED he denied chest pain and shortness of breath. Past Medical History/Comorbidities:   Mr. Sher Nguyen  has a past medical history of Cancer ([facility] (2012); Colon cancer ([facility] GERD (gastroesophageal reflux disease); History of anemia; Hypertension; Other ill-defined conditions(799.89); Psychiatric disorder; PTSD (post-traumatic stress disorder); PUD (peptic ulcer disease); Ulcer ([facility] (2008); and Unspecified sleep apnea. He also has no past medical history of Difficult intubation; Malignant hyperthermia due to anesthesia; Nausea & vomiting; or Pseudocholinesterase deficiency. Mr. Sher Nguyen  has a past surgical history that includes egd deliver thermal energy sphnctr/cardia gerd (2008); colonoscopy (N/A, 9/13/2016); vascular access; colonoscopy; heent (1980s); tonsillectomy; and flexible sigmoidoscopy (N/A, 6/9/2017).   Social History/Living Environment:   Home Environment: Private residence  # Steps to Enter: 1  One/Two Story Residence: Two story  # of Interior Steps: 12  Lift Chair Available: No  Living Alone: No  Support Systems: Spouse/Significant Other/Partner, Friends \ neighbors, Family member(s)  Patient Expects to be Discharged to[de-identified] Unknown  Current DME Used/Available at Home: Cane, straight  Prior Level of Function/Work/Activity:  Lives with wife, indep with gait and ADLs, 0 falls, drives, working as    Number of Personal Factors/Comorbidities that affect the Plan of Care: 3+: HIGH COMPLEXITY   EXAMINATION:   Most Recent Physical Functioning:   Gross Assessment:                  Posture:     Balance:    Bed Mobility:     Wheelchair Mobility:     Transfers:     Gait:            Body Structures Involved:  1. Heart  2. Lungs  3. Bones  4. Joints  5. Muscles  6. Ligaments Body Functions Affected:  1. Mental  2. Sensory/Pain  3. Cardio  4. Respiratory  5. Neuromusculoskeletal  6. Movement Related Activities and Participation Affected:  1. General Tasks and Demands  2. Mobility  3. Self Care  4. Domestic Life  5. Interpersonal Interactions and Relationships  6. Community, Social and [place]   Number of elements that affect the Plan of Care: 4+: HIGH COMPLEXITY   CLINICAL PRESENTATION:   Presentation: Evolving clinical presentation with changing clinical characteristics: MODERATE COMPLEXITY   CLINICAL DECISION MAKIN [facility] Mobility Inpatient Short Form  How much difficulty does the patient currently have. .. Unable A Lot A Little None   1. Turning over in bed (including adjusting bedclothes, sheets and blankets)? [] 1   [] 2   [x] 3   [] 4   2. Sitting down on and standing up from a chair with arms ( e.g., wheelchair, bedside commode, etc.)   [] 1   [] 2   [x] 3   [] 4   3. Moving from lying on back to sitting on the side of the bed? [] 1   [] 2   [x] 3   [] 4   How much help from another person does the patient currently need. .. Total A Lot A Little None   4. Moving to and from a bed to a chair (including a wheelchair)? [] 1   [] 2   [x] 3   [] 4   5. Need to walk in hospital room? [] 1   [x] 2   [] 3   [] 4   6. Climbing 3-5 steps with a railing? [] 1   [x] 2   [] 3   [] 4   © , Trustees of [address] [place], [place] [address], under license to [employer]. All rights reserved      Score:  Initial: 16 Most Recent: X (Date: -- )    Interpretation of Tool:  Represents activities that are increasingly more difficult (i.e. Bed mobility, Transfers, Gait). Score 24 23 22-20 19-15 14-10 9-7 6     Modifier CH CI CJ CK CL CM CN      ?  Mobility - Walking and Moving Around:     - CURRENT STATUS: CK - 40%-59% impaired, limited or restricted    - GOAL STATUS: CJ - 20%-39% impaired, limited or restricted    - D/C STATUS:  ---------------To be determined---------------  [place] / [place] / Product Type: Medicare /      Medical Necessity:     · Patient is expected to demonstrate progress in strength, balance, coordination and functional technique to decrease assistance required with gait, transfers,and functional mobility. Reason for Services/Other Comments:  · Patient continues to require skilled intervention due to decreased strength, decreased balance, decreased functional tolerance, decreased cardiopulmonary endurance affecting participation in basic ADLs and functional tasks. Use of outcome tool(s) and clinical judgement create a POC that gives a: Questionable prediction of patient's progress: MODERATE COMPLEXITY            TREATMENT:   (In addition to Assessment/Re-Assessment sessions the following treatments were rendered)   Pre-treatment Symptoms/Complaints:  None, confused  Pain: Initial:   Pain Intensity 1: 5  Pain Location 1: Leg  Pain Intervention(s) 1: Emotional support, Nurse notified  Post Session:  Unchanged with mobility, 0/10     Therapeutic Activity: (    9):  Therapeutic activities including Bed transfers to improve mobility and strength. Required maximal   to promote motor control of bilateral, upper extremity(s), lower extremity(s). Braces/Orthotics/Lines/Etc:   · IV  · O2 Device: Room air  Treatment/Session Assessment:    · Response to Treatment:  Poor  · Interdisciplinary Collaboration:   o Physical Therapist  o Registered Nurse  · After treatment position/precautions:   o Supine in bed  o Bed alarm/tab alert on  o Bed in low position  o Call light within reach  o RN notified   · Compliance with Program/Exercises: Will assess as treatment progresses. · Recommendations/Intent for next treatment session:   \"Next visit will focus on advancements to more challenging activities and reduction in assistance provided\".   Total Treatment Duration:PT Patient Time In/Time Out  Time In: 1340  Time Out: 1131 No. Madai Martin, PT

## 2017-11-03 NOTE — PROGRESS NOTES
Problem: Self Care Deficits Care Plan (Adult)  Goal: *Acute Goals and Plan of Care (Insert Text)  1. Patient will complete lower body bathing and dressing with SBA and adaptive equipment as needed. 2. Patient will complete toileting with SBA. 3. Patient will tolerate 23 minutes of OT treatment with less than 3 rest breaks to increase activity tolerance for ADLs. 4. Patient will complete functional transfers with SBA and adaptive equipment as needed. 5. Patient will be oriented to person, place, situation, and time with 100% accuracy on 3 of 3 attempts. Timeframe: 7 visits     Comments:     OCCUPATIONAL THERAPY: Daily Note, Treatment Day: 1st and AM 11/3/2017  INPATIENT: Hospital Day: 4  [place] / [place] / Product Type: Medicare /      NAME/AGE/GENDER: Rosa Freitas is a 75 y.o. male   PRIMARY DIAGNOSIS:  Pleural effusion [J90] Colon cancer metastasized to liver [facility]) Colon cancer metastasized to liver ([facility] [address]  Procedure(s) (LRB):  THORACENTESIS (N/A)  ULTRASOUND (Right)  2 Days Post-Op  ICD-10: Treatment Diagnosis:    · Generalized Muscle Weakness (M62.81)  · Other lack of cordination (R27.8)   Precautions/Allergies:     Oxaliplatin      ASSESSMENT:     Mr. Fam Mason presents to hospital for above. Pt lives with his wife in a two-story home, there is 1 step to enter from the garage and 10-12 interior steps. He is typically independent with ADLs/IADLs, including driving and working as an . This morning, RN cleared pt for OT and asked for help getting posey alarm under the pt in the recliner. Rn gave pain medications prior to OT entrance. Pt is drowsy, oriented to person only and continued to be confused at times throughout session, mumbling and perseverating on something, hard to understand. Supine in bed, but up to edge with additional time and CGA, good sitting balance unsupported at edge of bed, CGA x2 over to recliner. Lamb pad armed and in place.   Pt given warm cloth to wash his face and hands prior to breakfast.  Needed mod A to clean face and crust from around eyes. Needs oral care but refused to allow OT to assist.  Able to self feed liquids withy additional time and intiial setup. Drowsy from medications. Pt left sitting up in recliner, B feet elevated, with all needs in reach, breakfast still in front of him. Slow progress to meeting goals. He continues to present with decreased concentration/attention, safety awareness, insight to deficits, awareness to need for safety/assistance. Mr. Meron Sanabria is functioning well below his baseline and will require continued skilled OT services to maximize safety and independence with ADLs. Will follow. This section established at most recent assessment   PROBLEM LIST (Impairments causing functional limitations):  1. Decreased Strength  2. Decreased ADL/Functional Activities  3. Decreased Transfer Abilities  4. Decreased Ambulation Ability/Technique  5. Decreased Balance  6. Increased Pain  7. Decreased Activity Tolerance  8. Decreased Work Simplification/Energy Conservation Techniques  9. Increased Fatigue  10. Decreased Knowledge of Precautions  11. Decreased [place] with Home Exercise Program  12. Decreased Cognition   INTERVENTIONS PLANNED: (Benefits and precautions of occupational therapy have been discussed with the patient.)  1. Activities of daily living training  2. Adaptive equipment training  3. Balance training  4. Clothing management  5. Cognitive training  6. Community reintergration  7. Donning&doffing training  8. Group therapy  9. Therapeutic activity  10. Therapeutic exercise  11. Work reintegration     TREATMENT PLAN: Frequency/Duration: Follow patient 3x/week to address above goals.   Rehabilitation Potential For Stated Goals: Fair     RECOMMENDED REHABILITATION/EQUIPMENT: (at time of discharge pending progress): Due to the probability of continued deficits (see above) this patient will likely need continued skilled occupational therapy after discharge. Equipment:    None at this time              OCCUPATIONAL PROFILE AND HISTORY:   History of Present Injury/Illness (Reason for Referral):  See H&P  Past Medical History/Comorbidities:   Mr. Miles Yoo  has a past medical history of Cancer ([facility] [address] (2012); Colon cancer ([facility] [address] GERD (gastroesophageal reflux disease); History of anemia; Hypertension; Other ill-defined conditions(799.89); Psychiatric disorder; PTSD (post-traumatic stress disorder); PUD (peptic ulcer disease); Ulcer ([facility] [address] (2008); and Unspecified sleep apnea. He also has no past medical history of Difficult intubation; Malignant hyperthermia due to anesthesia; Nausea & vomiting; or Pseudocholinesterase deficiency. Mr. Miles Yoo  has a past surgical history that includes egd deliver thermal energy sphnctr/cardia gerd (2008); colonoscopy (N/A, 9/13/2016); vascular access; colonoscopy; heent (1980s); tonsillectomy; and flexible sigmoidoscopy (N/A, 6/9/2017). Social History/Living Environment:   Home Environment: Private residence  # Steps to Enter: 1  One/Two Story Residence: Two story  # of Interior Steps: 12  Lift Chair Available: No  Living Alone: No  Support Systems: Spouse/Significant Other/Partner, Friends \ neighbors, Family member(s)  Patient Expects to be Discharged to[de-identified] Unknown  Current DME Used/Available at Home: [employer], straight  Prior Level of Function/Work/Activity:  [place] with ADLs/IADLs  Personal Factors:          Sex:  male        Age:  75 y.o. Profession:      Number of Personal Factors/Comorbidities that affect the Plan of Care: Expanded review of therapy/medical records (1-2):  MODERATE COMPLEXITY   ASSESSMENT OF OCCUPATIONAL PERFORMANCE[de-identified]   Activities of Daily Living:           Basic ADLs (From Assessment) Complex ADLs (From Assessment)   Basic ADL  Feeding: Setup  Oral Facial Hygiene/Grooming: Minimum assistance  Bathing:  Moderate assistance  Upper Body Dressing: Setup  Lower Body Dressing: Moderate assistance  Toileting: Minimum assistance Instrumental ADL  Meal Preparation: Moderate assistance  Homemaking: Maximum assistance  Medication Management: Total assistance  Financial Management: Total assistance   Grooming/Bathing/Dressing Activities of Daily Living   Grooming  Grooming Assistance: Moderate assistance  Washing Face: Moderate assistance  Washing Hands: Supervision/set-up  Brushing/Combing Hair: Moderate assistance  Cues: Physical assistance; Tactile cues provided;Verbal cues provided;Visual cues provided Cognitive Retraining  Orientation Retraining: Reorienting  Attention to Task: Distractibility  Following Commands: Follows one step commands/directions (at times)  Safety/Judgement: Fall prevention;Decreased awareness of environment;Decreased awareness of need for assistance;Decreased awareness of need for safety;Decreased insight into deficits  Cues: Verbal cues provided     Feeding  Feeding Assistance: Stand-by assistance (clear liquids)  Container Management: Supervision/set-up  Drink to Mouth: Supervision/set-up; Stand-by assistance                 Bed/Mat Mobility  Rolling: Contact guard assistance  Supine to Sit: Contact guard assistance  Sit to Supine:  (up to recliner and remained there)  Sit to Stand: Contact guard assistance  Bed to Chair: Contact guard assistance  Scooting: Contact guard assistance       Most Recent Physical Functioning:   Gross Assessment:                  Posture:     Balance:  Sitting: Intact  Standing: Impaired  Standing - Static: Fair  Standing - Dynamic : Fair Bed Mobility:  Rolling: Contact guard assistance  Supine to Sit: Contact guard assistance  Sit to Supine:  (up to recliner and remained there)  Scooting: Contact guard assistance  Wheelchair Mobility:     Transfers:  Sit to Stand: Contact guard assistance  Stand to Sit: Contact guard assistance  Bed to Chair: Contact guard assistance                Patient Vitals for the past 6 hrs:   BP BP Patient Position SpO2 Pulse   17 0346 136/72 At rest 94 % (!) 105   17 0718 125/74 At rest 95 % 98       Mental Status  Neurologic State: Drowsy, Confused  Orientation Level: Oriented to person, Disoriented to place, Disoriented to situation, Disoriented to time  Cognition: Decreased attention/concentration, Decreased command following, Poor safety awareness  Perception: Appears intact  Perseveration: Perseverates during conversation  Safety/Judgement: Fall prevention, Decreased awareness of environment, Decreased awareness of need for assistance, Decreased awareness of need for safety, Decreased insight into deficits                          Physical Skills Involved:  1. Range of Motion  2. Balance  3. Strength  4. Activity Tolerance  5. Gross Motor Control Cognitive Skills Affected (resulting in the inability to perform in a timely and safe manner):  1. Executive Function  2. Immediate Memory  3. Short Term Recall  4. Sustained Attention  5. Divided Attention  6. Expression Psychosocial Skills Affected:  1. Habits/Routines  2. Environmental Adaptation  3. Social Interaction  4. Social Roles   Number of elements that affect the Plan of Care: 5+:  HIGH COMPLEXITY   CLINICAL DECISION MAKIN[address] [place] [address] AM-PAC 6 Clicks   Daily Activity Inpatient Short Form  How much help from another person does the patient currently need. .. Total A Lot A Little None   1. Putting on and taking off regular lower body clothing? [] 1   [x] 2   [] 3   [] 4   2. Bathing (including washing, rinsing, drying)? [] 1   [x] 2   [] 3   [] 4   3. Toileting, which includes using toilet, bedpan or urinal?   [] 1   [] 2   [x] 3   [] 4   4. Putting on and taking off regular upper body clothing? [] 1   [] 2   [] 3   [x] 4   5. Taking care of personal grooming such as brushing teeth? [] 1   [] 2   [x] 3   [] 4   6. Eating meals?    [] 1   [] 2   [] 3   [x] 4   © , Trustees of [address] [place] [address], under license to [employer]. All rights reserved      Score:  Initial: 18 Most Recent: X (Date: -- )    Interpretation of Tool:  Represents activities that are increasingly more difficult (i.e. Bed mobility, Transfers, Gait). Score 24 23 22-20 19-15 14-10 9-7 6     Modifier CH CI CJ CK CL CM CN      ? Self Care:     - CURRENT STATUS: CK - 40%-59% impaired, limited or restricted    - GOAL STATUS: CJ - 20%-39% impaired, limited or restricted    - D/C STATUS:  ---------------To be determined---------------  [place] / Product Type: Medicare /      Medical Necessity:     · Patient is expected to demonstrate progress in strength, balance, coordination and functional technique to decrease assistance required with ADLs. Reason for Services/Other Comments:  · Patient continues to require skilled intervention due to medical complications and patient unable to attend/participate in therapy as expected. Use of outcome tool(s) and clinical judgement create a POC that gives a: MODERATE COMPLEXITY         TREATMENT:   (In addition to Assessment/Re-Assessment sessions the following treatments were rendered)     Pre-treatment Symptoms/Complaints:  \"I will get to it when I am ready. \"  Pain: Initial:   Pain Intensity 1: 5 (RN gave pain meds prior to OT session)  Pain Location 1: Generalized  Post Session:  Unchanged during OT session, but repots the pain meds given earlier are working. Self Care: (15 mins): Procedure(s) (per grid) utilized to improve and/or restore self-care/home management as related to bathing, grooming and self feeding. Required moderate visual, verbal, tactile and   cueing to facilitate activities of daily living skills and compensatory activities. Therapeutic Activity: (    12 mins):   Therapeutic activities including Bed transfers, Chair transfers, Ambulation on level ground and activity tolerance to improve mobility, strength, balance, coordination and activity tolerance for ADLs. Required moderate   to promote dynamic balance in standing. Braces/Orthotics/Lines/Etc:   · IV  · O2 Device: Room air  Treatment/Session Assessment:    · Response to Treatment:  Fair tolerance, very drowsy due to pain meds, needs oral care, RN reports pt refused her attempts as well, still confused at times, CGA x2 to transfer. · Interdisciplinary Collaboration:   o Occupational Therapist  o Registered Nurse  o   o Certified Nursing Assistant/Patient Care Technician  · After treatment position/precautions:   o Up in chair  o Bed alarm/tab alert on  o Bed/Chair-wheels locked  o Bed in low position  o Call light within reach  o RN notified  o B feet elevated, tray table across pt   · Compliance with Program/Exercises: compliant at times. · Recommendations/Intent for next treatment session: \"Next visit will focus on advancements to more challenging activities and reduction in assistance provided\".   Total Treatment Duration: 27 mins  OT Patient Time In/Time Out  Time In: 0901  Time Out: [place], OT Dorathy Goltz, MS, OTR/L

## 2017-11-03 NOTE — PROGRESS NOTES
GI DAILY PROGRESS NOTE/Sign Off Note    Admit Date:  10/31/2017    Today's Date:  11/3/2017    CC:  Cholestasis    Subjective:     Patient: \"I hurt all over. \" Denies any N&V. Medications:   Current Facility-Administered Medications   Medication Dose Route Frequency    TPN ADULT-CENTRAL - dextrose 15% amino acid 5%   IntraVENous QPM    NUTRITIONAL SUPPORT ELECTROLYTE PRN ORDERS   Does Not Apply PRN    fat emulsion 20% (LIPOSYN, INTRAlipid) infusion  21 mL/hr IntraVENous Q12H    vancomycin (VANCOCIN) 1500 mg in  ml infusion  1,500 mg IntraVENous Q18H    levoFLOXacin (LEVAQUIN) 500 mg in D5W IVPB  500 mg IntraVENous Q24H    docusate sodium (COLACE) capsule 100 mg  100 mg Oral BID PRN    meloxicam (MOBIC) tablet 7.5 mg  7.5 mg Oral BID    pantoprazole (PROTONIX) tablet 40 mg  40 mg Oral ACB    pyridoxine (vitamin B6) (VITAMIN B-6) tablet 50 mg  50 mg Oral DAILY    traMADol (ULTRAM) tablet 50 mg  50 mg Oral Q6H PRN    ondansetron (ZOFRAN) injection 4 mg  4 mg IntraVENous Q6H PRN     Facility-Administered Medications Ordered in Other Encounters   Medication Dose Route Frequency    saline peripheral flush soln 10 mL  10 mL InterCATHeter PRN       Review of Systems:  ROS was obtained, with pertinent positives as listed above. No chest pain or SOB. Diet:  Clear liquid     Objective:   Vitals:  Visit Vitals    /74 (BP 1 Location: Left arm, BP Patient Position: At rest)    Pulse 98    Temp 98.5 °F (36.9 °C)    Resp 18    Ht 5' 8\" (1.727 m)    Wt 82.3 kg (181 lb 8 oz)    SpO2 95%    BMI 27.6 kg/m2     Intake/Output:     11/01 1901 - 11/03 0700  In: 7260 [P.O.:720;  I.V.:3483]  Out: 200 [Urine:200]  Exam:  General appearance: alert, cooperative, no distress JAUNDICE; SITTING UP IN CHAIR; DOESN'T WANT ME TO EXAMINE HIM TODAY  Heart: regular rate and rhythm  Abdomen: WONT ALLOW ME TO DO ABDOMINAL EXAM  Extremities: NO OBVIOUS EDEMA  Neuro:  alert and oriented TO PERSON AND PLACE    Data Review (Labs):    Recent Labs      11/03/17   0347  11/02/17   1115  11/02/17   0353  11/01/17   0721  11/01/17   0716  10/31/17   2058  10/31/17   1312   WBC  16.8*   --   12.4*   --   8.8  11.2*  13.8*   HGB  10.3*   --   10.3*   --   9.7*  10.9*  11.3*   HCT  30.4*   --   30.5*   --   28.2*  31.9*  32.9*   PLT  150   --   149*   --   140*  149*  171   MCV  99.0*   --   99.7*   --   99.3*  99.7*  99.7*   NA  140   --   142   --   139   --   137   K  4.4   --   4.6   --   4.8   --   4.9   CL  109*   --   109*   --   106   --   100   CO2  21   --   23   --   22   --   23   BUN  39*   --   39*   --   42*   --   42*   CREA  0.93   --   0.86   --   1.19   --   1.54*   CA  8.5   --   8.1*   --   8.3   --   9.1   MG  2.4  2.3   --    --    --   2.1   --    GLU  146*   --   101*   --   66   --   160*   AP  636*   --   545*   --   484*   --   563*   SGOT  87*   --   102*   --   98*   --   99*   ALT  58   --   63   --   62   --   66*   TBILI  8.2*   --   8.7*   --   9.3*   --   10.9*   CBIL   --    --    --    --   7.9*   --    --    ALB  1.2*   --   1.3*   --   1.3*   --   1.6*   TP  4.6*   --   4.9*   --   4.8*   --   5.9*   PTP   --    --    --   16.3*   --    --    --    INR   --    --    --   1.5*   --    --    --        Assessment:     Principal Problem:    Colon cancer metastasized to liver (HCC) (10/31/2017)    Active Problems:    Hypoxemia (10/10/2016)      Acute liver failure (10/9/2017)      Altered mental state (10/31/2017)      Failure to thrive (0-17) (10/31/2017)      Acute kidney injury (HCC) (10/31/2017)      Pleural effusion on right (11/1/2017)    75year old male who we have been following for abnormal LFTS. This is intrahepatic cholestasis likely secondary to drug injury. LFTs have slowly been decreasing. 11/3/17 T bili 8.2 (8.7), ALT 58 (63), AST 87 (102), (545). Plan: 1. Continue to follow serial LFTS. Sohail Becerra.  Kalina Lieberman in collaboration with Dr. Rhodes Dys  [facility]    I have seen and examined the patient, and I have directed and agreed to the plan as described. Transaminases and bilirubin are gradually improving, but AP is not improving. Intrahepatic cholestasis can take weeks or months to resolve. Other than avoiding potential insults such as the Rocephin and Compazine that have been stopped, there is no other action that will help this process. We are signing off. Outpatient follow up is already planned for stent exchange. There is no value in following LFTs daily at this point. Weekly labs would be sufficient. Please call with questions.     Barrie Lesch, MD

## 2017-11-03 NOTE — PROGRESS NOTES
END OF SHIFT NOTE:    Intake/Output  11/02 1901 - 11/03 0700  In: 6970 [I.V.:1135]  Out: 200 [Urine:200]   Voiding: YES  Catheter: NO  Drain:              Stool:  0 occurrences. Emesis:  0 occurrences. VITAL SIGNS  Patient Vitals for the past 12 hrs:   Temp Pulse Resp BP SpO2   11/03/17 0346 97.8 °F (36.6 °C) (!) 105 18 136/72 94 %   11/03/17 0033 98.4 °F (36.9 °C) (!) 106 18 156/68 95 %   11/02/17 1954 98.5 °F (36.9 °C) (!) 105 18 118/76 99 %       Pain Assessment  Pain 1  Pain Scale 1: Visual (11/03/17 0439)  Pain Intensity 1: 0 (11/03/17 0439)  Patient Stated Pain Goal: 0 (11/03/17 0409)  Pain Reassessment 1: Patient sleeping (11/03/17 0439)  Pain Location 1: Generalized (11/03/17 0409)  Pain Description 1: Constant (11/03/17 0409)  Pain Intervention(s) 1: Medication (see MAR) (11/03/17 0409)    Ambulating  No    Additional Information: patient has been pleasantly confused the whole shift. Constantly trying to get out of bed stating he needs to go to [place]. Was given one dose of pain meds on shift due to stating he was in pain. Patient had bed alarm and posey. Vitals stable. HR still tachy. Patient wont wear oxygen but is sating in the 90's     Shift report given to oncoming nurse at the bedside.     Karen Bright, RN

## 2017-11-03 NOTE — PROGRESS NOTES
Problem: Falls - Risk of  Goal: *Absence of Falls  Document Glenn Fall Risk and appropriate interventions in the flowsheet.    Outcome: Progressing Towards Goal  Fall Risk Interventions:  Mobility Interventions: Bed/chair exit alarm, Patient to call before getting OOB, PT Consult for mobility concerns, PT Consult for assist device competence, Communicate number of staff needed for ambulation/transfer    Mentation Interventions: Adequate sleep, hydration, pain control, Bed/chair exit alarm, Door open when patient unattended, Reorient patient, Toileting rounds    Medication Interventions: Bed/chair exit alarm, Evaluate medications/consider consulting pharmacy, Patient to call before getting OOB, Teach patient to arise slowly    Elimination Interventions: Bed/chair exit alarm, Call light in reach, Patient to call for help with toileting needs, Toileting schedule/hourly rounds    History of Falls Interventions: Bed/chair exit alarm, Consult care management for discharge planning, Door open when patient unattended

## 2017-11-03 NOTE — PROGRESS NOTES
Problem: Nutrition Deficit  Goal: *Optimize nutritional status  Nutrition  Reason for assessment: \"PPN\" management per nutritional support protocols (Hematology and Oncology)  Assessment:   Diet order(s): Regular than CLQ 10/31-present  Food/Nutrition History:  Patient remains TPN dependent while on CLQ diet d/t Intrahepatic cholestasis. Abdomen noted as distended; date of last BM: 11/3. Central line access : [place]. Due to TPN shortage, consider feeding patient enterally with low residue formula via NJT. Pertinent Labs: Na 140 trending down to, Cl stable at 109, C02 trending down to 21-current additives in max Chloride; K 4.4 with 10 meq KP04 in TPN; Phos down to 2.9, Mg 2.4; Triglyceride 162  T bili trending down to 8.2, Cca 10.74  Anthropometrics:Height: 5' 8\" (172.7 cm), Weight Source: Standing scale (11/3, 5th floor), Weight: 82.3 kg  Edema: Trace  Macronutrient needs:  EER:  2561-6059 kcal /day (20-25 kcal/kg listed BW)  EPR:  70-91 grams protein/day (1-1.3 grams/kg IBW)  Max CHO:  403 grams/day (4mg/kg IBW/min)  Fluid:  1ml/kcal  Intake/Comparative Standards: Current TPN: 15%DEX/ 5%AA at 45 ml/hr with 500 ml 20% Lipids daily provides  1710 kcal/d (100% of needs), 50 grams of protein/d (71% of needs), 150 grams of CHO/d (does not exceed maximum CHO load) and 2000 ml of total volume/d ( 100% of needs). CLQ diet is inadequate in kcal/protein. Intervention:   Meals and snacks: CLQ  Nutritional Supplement Therapy: Electrolyte replacement per nutritional support protocols are active on MAR. PN/IVF:   1. Continue TPN: 15%DEX/ 5%AA at 45 ml/hr with 500 ml 20% Lipids daily provides  1710 kcal/d (100% of needs), 50 grams of protein/d (71% of needs), 150 grams of CHO/d (does not exceed maximum CHO load) and 2000 ml of total volume/d ( 100% of needs). 1. Adjust additives: Add 20 meq/L NaP04, remove NaCl and add 120 meq/L NaAcetate. Decrease Mg to 5 meq/L. TPN to remain calcium free.  Patient will receive 30 meq/L Phosphorus and 140 meq/L sodium in tonight's TPN. 2. Add TPN labs, POC Glucoses/SSI if Glucose > 180 mg/dl on AM BMP. EN: Consider placement of NJT for enteral nutrition. Coordination of Nutrition Care: Gerda Francisco RN  Discharge nutrition plan:  Too soon to determine  [address] [facility], [facility] [address], [address] [place] [address], [address] [place] [address], [phone]

## 2017-11-03 NOTE — PROGRESS NOTES
[facility]        Inpatient Hematology / Oncology Progress Note      Admission Date: 10/31/2017 12:56 PM  Reason for Admission/Hospital Course: Pleural effusion [J90]    24 Hour Events:  Afebrile, vitals stable  Thoracentesis yesterday  BC positive for GM negative rods  Stopped Vancomycin / Started cefepime  TPN started      ROS:  Constitutional: Positive for fatigue/malaise; negative for fever, chills  CV: Negative for chest pain, palpitations, edema. Respiratory: Negative for dyspnea, cough, wheezing. GI: Positive for RUQ pain; negative for nausea    10 point review of systems is otherwise negative with the exception of the elements mentioned above in the HPI. Allergies   Allergen Reactions    Oxaliplatin Shortness of Breath, Rash and Other (comments)     Severe abdominal pain       OBJECTIVE:  Patient Vitals for the past 8 hrs:   BP Temp Pulse Resp SpO2   17 1444 120/68 97.4 °F (36.3 °C) (!) 112 18 97 %   17 1113 151/73 98.5 °F (36.9 °C) (!) 103 18 93 %   17 0718 125/74 98.5 °F (36.9 °C) 98 18 95 %     Temp (24hrs), Av.2 °F (36.8 °C), Min:97.4 °F (36.3 °C), Max:98.5 °F (36.9 °C)     07 -  1900  In: -   Out: 200 [Urine:200]    Physical Exam:  Constitutional: Ill-appearing male in no acute distress, lying comfortably in the hospital bed. HEENT: Normocephalic and atraumatic. Oropharynx is clear, mucous membranes are moist. +icteric sclera     Lymph node   Deferred   Skin Warm and dry. No bruising and no rash noted. No erythema. No pallor. Respiratory Lungs are clear to auscultation bilaterally without wheezes, rales or rhonchi, normal air exchange without accessory muscle use. CVS Normal rate, regular rhythm and normal S1 and S2. No murmurs, gallops, or rubs. Abdomen Soft, +tenderness to light touch RUQ and nondistended, normoactive bowel sounds.     Neuro Drowsy, oriented to person   MSK Normal range of motion in general.  No edema and no tenderness. Psych Appropriate mood and affect. Labs:      Recent Labs      11/03/17 0347 11/02/17   0353  11/01/17   0716   WBC  16.8*  12.4*  8.8   RBC  3.07*  3.06*  2.84*   HGB  10.3*  10.3*  9.7*   HCT  30.4*  30.5*  28.2*   MCV  99.0*  99.7*  99.3*   MCH  33.6*  33.7*  34.2*   MCHC  33.9  33.8  34.4   RDW  15.8*  15.8*  16.0*   PLT  150  149*  140*   GRANS  90*  90*  88*   LYMPH  4*  3*  5*   MONOS  4  5  6   EOS  1  2  1   BASOS  0  0  0   IG  1  0  0   DF  AUTOMATED  AUTOMATED  AUTOMATED   ANEU  15.2*  11.2*  7.8   ABL  0.6  0.4*  0.4*   ABM  0.7  0.6  0.5   DINA  0.2  0.2  0.1   ABB  0.0  0.0  0.0   AIG  0.1  0.0  0.0        Recent Labs      11/03/17   0347 11/02/17   1115  11/02/17   0353  11/01/17   0716  10/31/17   2058   NA  140   --   142  139   --    K  4.4   --   4.6  4.8   --    CL  109*   --   109*  106   --    CO2  21   --   23  22   --    AGAP  10   --   10  11   --    GLU  146*   --   101*  66   --    BUN  39*   --   39*  42*   --    CREA  0.93   --   0.86  1.19   --    GFRAA  >60   --   >60  >60   --    GFRNA  >60   --   >60  >60   --    CA  8.5   --   8.1*  8.3   --    SGOT  87*   --   102*  98*   --    AP  636*   --   545*  484*   --    TP  4.6*   --   4.9*  4.8*   --    ALB  1.2*   --   1.3*  1.3*   --    GLOB  3.4   --   3.6*  3.5   --    AGRAT  0.4*   --   0.4*  0.4*   --    MG  2.4  2.3   --    --   2.1   PHOS  2.9  3.4   --    --    --      Imaging:  US ABD LTD [[id number]] Collected: 11/01/17 1014      Order Status: Completed Updated: 11/01/17 1019     Narrative:       Right upper quadrant abdominal ultrasound    History: Jaundice, h/o biliary stricture, metastatic colon cancer, 75 years Male    Comparison: Abdominal ultrasound October 14, 2017, CT torso September 20, 2017. Findings:  The liver is heterogeneous in echotexture and there is no dilatation  of the intrahepatic ducts.  Again visualized is a small hypoechoic mass adjacent  to the right hepatic lobe which may be subcapsular or subserosal measuring 2.2 x  1.4 x 1.4 cm, consistent with a metastatic deposit as seen on prior CT torso. The liver measures 14 cm in midclavicular length.  Normal hepatopedal flow  within the main portal vein.  The gallbladder is surgically absent. The proximal  common duct is not dilated and measures 7 mm, containing a biliary stent.  The  right kidney is normal appearing and measures 8.3 cm in length.  Pancreas not  well-visualized due to overlying bowel gas. The visualized portions of the IVC  are unremarkable.  And aorta not visualized due to overlying bowel gas.  Small  volume ascites unchanged.  Small right pleural effusion unchanged.       Impression:       Impression:     1.  No significant interval change in hepatic serosal metastatic deposits, small  volume ascites, and small right pleural effusion. 2.  Other chronic findings as above.     XR ABD (KUB) [[id number]] Collected: 11/01/17 0900     Order Status: Completed Updated: 11/01/17 0903     Narrative:       KUB supine views    History:  decreased bowel sounds, abdominal pain, 75 years Male    Comparison:  Chest radiograph October 13, 2016, yesterday    Findings: Biliary stent appears in anatomic position.  No free air is evident. The bowel gas pattern is nonspecific and there is no evidence of ileus or  obstruction.  No suspicious renal or ureteral calculi are evident.  Small right  and trace left pleural effusions relatively unchanged since yesterday. Visualized osseous structures unremarkable.       Impression:       Impression: No acute intra-abdominal pathology identified.             XR CHEST PORT [[id number]] Collected: 10/31/17 1324     Order Status: Completed Updated: 10/31/17 1329     Narrative:       1 View portable chest x-ray 10/31/2017 1:21 PM    Indication: Chest pressure pain. Patient has history of colon cancer. Comparison: CT chest 9/20/2017    Findings:  This portable upright AP chest of 1304 shows the lungs somewhat  underventilated. An implanted venous port does remain in place on the left. The  heart is not significantly enlarged. There is clearly increased reticular and  hazy opacities on the right with the hemidiaphragm and costophrenic angle  obscured. The left lung appears overall still clear.       Impression:       IMPRESSION: Probably there is now a large right pleural effusion with associated  atelectasis.          ASSESSMENT:    Problem List  Date Reviewed: 10/16/2017          Codes Class Noted    Pleural effusion on right ICD-10-CM: J90  ICD-9-CM: 511.9  11/1/2017        Altered mental state ICD-10-CM: R41.82  ICD-9-CM: 780.97  10/31/2017        Failure to thrive (0-17) (Chronic) ICD-10-CM: R62.51  ICD-9-CM: 783.41  10/31/2017        Acute kidney injury [facility]) ICD-10-CM: N17.9  ICD-9-CM: 584.9  10/31/2017        * (Principal)Colon cancer metastasized to liver [facility]) (Chronic) ICD-10-CM: C18.9, C78.7  ICD-9-CM: 153.9, 197.7  10/31/2017        Hyperbilirubinemia ICD-10-CM: E80.6  ICD-9-CM: 782.4  10/9/2017        Acute liver failure without hepatic coma ICD-10-CM: K72.00  ICD-9-CM: 570  10/9/2017        Other constipation ICD-10-CM: K59.09  ICD-9-CM: 564.09  10/9/2017        Acute liver failure ICD-10-CM: K72.00  ICD-9-CM: 703  10/9/2017        Small bowel obstruction ICD-10-CM: B86.107  ICD-9-CM: 560.9  6/5/2017        Dehydration ICD-10-CM: E86.0  ICD-9-CM: 276.51  6/2/2017        Malignant neoplasm of colon (HCC) (Chronic) ICD-10-CM: C18.9  ICD-9-CM: 153.9  10/10/2016        Metastatic colon cancer to liver (HCC) (Chronic) ICD-10-CM: C18.9, C78.7  ICD-9-CM: 153.9, 197.7  10/10/2016        Hypoxemia ICD-10-CM: R09.02  ICD-9-CM: 799.02  10/10/2016        Hand foot syndrome ICD-10-CM: L27.1  ICD-9-CM: 693.0  7/6/2016        Dysgeusia ICD-10-CM: R43.2  ICD-9-CM: 781.1  6/10/2014        Fatigue ICD-10-CM: R53.83  ICD-9-CM: 780.79  10/29/2013        Urinary urgency ICD-10-CM: R39.15  ICD-9-CM: 095.42 10/26/2012    Overview Signed 10/26/2012  2:16 PM by Jessica Batres     10-26-12 start flomax                   Mr. Tracie Gillespie is 79-year-old white male well known to us. He is a patient of Dr. Dhara Burgos with history of colon cancer with metastasis to the liver with subsequent liver failure. 10/31 he presented to the emergency department with increasing weakness and confusion over the past several days. [facility] wife reports that he has been incoherent, unable to walk, and having difficulty speaking since last night.  She also reports that he has had worsening jaundice and that his urine has been bloody at times as well as dark yellow. She reports no fever or vomiting.  Over the past 3 days his PO intake has been minimal.  In the past, he has had several lines of chemotherapy with his most recent being Vectibix. He has only received 1 cycle on 10/19/17. He is scheduled to receive his next cycle on 11/2/17. His wife reports that he complained of chest pain earlier today, but upon arriving to the ED he denied chest pain and shortness of breath.     PLAN:  Elevated LFTs/hyperbilirubinemia  - Bilirubin 9.3 today. Seen by GI. Abdominal ultrasound done this morning. No evidence of obstruction (stent in place) thus no indication for ERCP. GI feels due to medications or congestion from intrahepatic mets. Will change rocephin to levaquin and follow  11/2 LFTs improving    UTI/sepsis  - Repeat lactic acid this morning. Continue vanc. Change rocephin to levaquin. Await cultures  11/02 UCNTD. BC from peripheral and port with GNR. Follow for susceptability  11/03 Stopped Vancomycin. Started cefepime    Pleural effusion  - Noted on chest xray. Will have pulmonary evaluate for thoracentesis  11/02 Thoracentesis yesterday -1300    AMS  - Likely multifactorial: liver failure, sepsis. Will check CT head to rule out bleed, metastasis, etc  11/02 CT wnl. AMS improving    Colon cancer with liver mets  - S/p cycle 1 vectibix.  Cycle 2 due tomorrow - will hold until clinically improved    Nutrition  11/02  TPN started    Светлана SOPs  SCDs for DVT prophylaxis                [facility]  [address] [id number] [facility]  Office : [phone]  Fax : [phone]           Attending Addendum:  I personally evaluated the patient with Inna Kingsley N.P.,  and agree with the assessment, findings and plan as documented. Continue ABX, we will follow-up the results of his blood cultures.               Neva Zhang MD  [facility]  [address] [id number] [facility]  Office : [phone]  Fax : [phone]

## 2017-11-04 NOTE — PROGRESS NOTES
END OF SHIFT NOTE:    Intake/Output      Voiding: YES  Catheter: NO  Drain:              Stool:  0 occurrences. Emesis:  0 occurrences. VITAL SIGNS  Patient Vitals for the past 12 hrs:   Temp Pulse Resp BP SpO2   11/04/17 0355 98.1 °F (36.7 °C) (!) 121 20 136/73 92 %   11/04/17 0124 98.8 °F (37.1 °C) (!) 109 18 143/74 97 %   11/03/17 1950 98.2 °F (36.8 °C) 93 18 134/68 92 %       Pain Assessment  Pain 1  Pain Scale 1: Visual (11/04/17 0320)  Pain Intensity 1: 0 (11/03/17 1920)  Patient Stated Pain Goal: 0 (11/03/17 0900)  Pain Reassessment 1: Patient sleeping (11/03/17 0439)  Pain Onset 1:  (left leg seemed to cause him a lot of pain with movement.) (11/03/17 1349)  Pain Location 1: Leg (11/03/17 1349)  Pain Description 1: Constant (11/03/17 0409)  Pain Intervention(s) 1: Emotional support;Nurse notified (11/03/17 1349)    Ambulating  No    Additional Information:     Shift report given to oncoming nurse at the bedside.     Jose Lazo

## 2017-11-04 NOTE — PROGRESS NOTES
[facility]        Inpatient Hematology / Oncology Progress Note      Admission Date: 10/31/2017 12:56 PM  Reason for Admission/Hospital Course: Pleural effusion [J90]    24 Hour Events:  Afebrile / Tachycardic   BC positive for GM negative rods  WBC 24,000  Family at bedside    ROS:  Constitutional: Positive for fatigue/malaise  CV: Negative for chest pain, palpitations, edema. Respiratory: Negative for dyspnea, cough, wheezing. GI: Positive for RUQ pain; negative for nausea; abd distention    10 point review of systems is otherwise negative with the exception of the elements mentioned above in the HPI. Allergies   Allergen Reactions    Oxaliplatin Shortness of Breath, Rash and Other (comments)     Severe abdominal pain       OBJECTIVE:  Patient Vitals for the past 8 hrs:   BP Temp Pulse Resp SpO2   17 1602 155/80 98.3 °F (36.8 °C) (!) 102 20 95 %   17 1140 112/86 97.5 °F (36.4 °C) (!) 112 20 94 %     Temp (24hrs), Av.2 °F (36.8 °C), Min:97.5 °F (36.4 °C), Max:98.8 °F (37.1 °C)         Physical Exam:  Constitutional: Ill-appearing male in no acute distress, lying comfortably in the hospital bed. HEENT: Normocephalic and atraumatic. Oropharynx is clear, mucous membranes are moist. +icteric sclera     Lymph node   Deferred   Skin Warm and dry. No bruising and no rash noted. No erythema. No pallor. Respiratory Lungs are clear to auscultation bilaterally without wheezes, rales or rhonchi, normal air exchange without accessory muscle use. CVS Normal rate, regular rhythm and normal S1 and S2. No murmurs, gallops, or rubs. Abdomen Distended, tender to light touch RUQ      Neuro Drowsy, oriented to person   MSK Normal range of motion in general.  No edema and no tenderness. Psych Appropriate mood and affect.         Labs:      Recent Labs      17   1113  17   0347  17   0353   WBC  24.0*  16.8*  12.4*   RBC  3.43*  3.07*  3.06*   HGB  11.8* 10.3*  10.3*   HCT  33.8*  30.4*  30.5*   MCV  98.5*  99.0*  99.7*   MCH  34.4*  33.6*  33.7*   MCHC  34.9  33.9  33.8   RDW  15.4*  15.8*  15.8*   PLT  126*  150  149*   GRANS  90*  90*  90*   LYMPH  3*  4*  3*   MONOS  5  4  5   EOS  1  1  2   BASOS  0  0  0   IG  1  1  0   DF  AUTOMATED  AUTOMATED  AUTOMATED   ANEU  21.7*  15.2*  11.2*   ABL  0.8  0.6  0.4*   ABM  1.1  0.7  0.6   DINA  0.2  0.2  0.2   ABB  0.0  0.0  0.0   AIG  0.2  0.1  0.0        Recent Labs      11/04/17   1113  11/03/17 2014 11/03/17   0347   11/02/17   0353   NA  142   --   140   --   142   K  4.5   --   4.4   --   4.6   CL  111*   --   109*   --   109*   CO2  23   --   21   --   23   AGAP  8   --   10   --   10   GLU  112*   --   146*   --   101*   BUN  47*   --   39*   --   39*   CREA  1.02   --   0.93   --   0.86   GFRAA  >60   --   >60   --   >60   GFRNA  >60   --   >60   --   >60   CA  8.8   --   8.5   --   8.1*   SGOT  65*   --   87*   --   102*   AP  675*   --   636*   --   545*   TP  4.8*   --   4.6*   --   4.9*   ALB  1.2*   --   1.2*   --   1.3*   GLOB  3.6*   --   3.4   --   3.6*   AGRAT  0.3*   --   0.4*   --   0.4*   MG  2.5*  2.5*  2.4   < >   --    PHOS  3.9*  3.1  2.9   < >   --     < > = values in this interval not displayed. Imaging:  US ABD LTD [[id number]] Collected: 11/01/17 1014      Order Status: Completed Updated: 11/01/17 1019     Narrative:       Right upper quadrant abdominal ultrasound    History: Jaundice, h/o biliary stricture, metastatic colon cancer, 75 years Male    Comparison: Abdominal ultrasound October 14, 2017, CT torso September 20, 2017. Findings: The liver is heterogeneous in echotexture and there is no dilatation  of the intrahepatic ducts.  Again visualized is a small hypoechoic mass adjacent  to the right hepatic lobe which may be subcapsular or subserosal measuring 2.2 x  1.4 x 1.4 cm, consistent with a metastatic deposit as seen on prior CT torso.    The liver measures 14 cm in midclavicular length.  Normal hepatopedal flow  within the main portal vein.  The gallbladder is surgically absent. The proximal  common duct is not dilated and measures 7 mm, containing a biliary stent.  The  right kidney is normal appearing and measures 8.3 cm in length.  Pancreas not  well-visualized due to overlying bowel gas. The visualized portions of the IVC  are unremarkable.  And aorta not visualized due to overlying bowel gas.  Small  volume ascites unchanged.  Small right pleural effusion unchanged.       Impression:       Impression:     1.  No significant interval change in hepatic serosal metastatic deposits, small  volume ascites, and small right pleural effusion. 2.  Other chronic findings as above.     XR ABD (KUB) [[id number]] Collected: 11/01/17 0900     Order Status: Completed Updated: 11/01/17 0903     Narrative:       KUB supine views    History:  decreased bowel sounds, abdominal pain, 75 years Male    Comparison:  Chest radiograph October 13, 2016, yesterday    Findings: Biliary stent appears in anatomic position.  No free air is evident. The bowel gas pattern is nonspecific and there is no evidence of ileus or  obstruction.  No suspicious renal or ureteral calculi are evident.  Small right  and trace left pleural effusions relatively unchanged since yesterday. Visualized osseous structures unremarkable.       Impression:       Impression: No acute intra-abdominal pathology identified.             XR CHEST PORT [[id number]] Collected: 10/31/17 1324     Order Status: Completed Updated: 10/31/17 1329     Narrative:       1 View portable chest x-ray 10/31/2017 1:21 PM    Indication: Chest pressure pain. Patient has history of colon cancer. Comparison: CT chest 9/20/2017    Findings: This portable upright AP chest of 1304 shows the lungs somewhat  underventilated. An implanted venous port does remain in place on the left. The  heart is not significantly enlarged.  There is clearly increased reticular and  hazy opacities on the right with the hemidiaphragm and costophrenic angle  obscured. The left lung appears overall still clear.       Impression:       IMPRESSION: Probably there is now a large right pleural effusion with associated  atelectasis.          ASSESSMENT:    Problem List  Date Reviewed: 11/3/2017          Codes Class Noted    Pleural effusion on right ICD-10-CM: J90  ICD-9-CM: 511.9  11/1/2017    Overview Signed 11/3/2017  3:59 PM by Karthik Santos NP     11/1/17Right thoracentesis:  1300 cc of bright yellow fluid removed             Altered mental state ICD-10-CM: R41.82  ICD-9-CM: 780.97  10/31/2017        Failure to thrive (0-17) (Chronic) ICD-10-CM: R62.51  ICD-9-CM: 783.41  10/31/2017        Acute kidney injury [facility]) ICD-10-CM: N17.9  ICD-9-CM: 584.9  10/31/2017        * (Principal)Colon cancer metastasized to liver [facility]) (Chronic) ICD-10-CM: C18.9, C78.7  ICD-9-CM: 153.9, 197.7  10/31/2017        Hyperbilirubinemia ICD-10-CM: E80.6  ICD-9-CM: 782.4  10/9/2017        Acute liver failure without hepatic coma ICD-10-CM: K72.00  ICD-9-CM: 570  10/9/2017        Other constipation ICD-10-CM: K59.09  ICD-9-CM: 564.09  10/9/2017        Acute liver failure ICD-10-CM: K72.00  ICD-9-CM: 397  10/9/2017        Small bowel obstruction ICD-10-CM: A47.502  ICD-9-CM: 560.9  6/5/2017        Dehydration ICD-10-CM: E86.0  ICD-9-CM: 276.51  6/2/2017        Malignant neoplasm of colon ([address] (Chronic) ICD-10-CM: C18.9  ICD-9-CM: 153.9  10/10/2016        Metastatic colon cancer to liver (HCC) (Chronic) ICD-10-CM: C18.9, C78.7  ICD-9-CM: 153.9, 197.7  10/10/2016        Hypoxemia ICD-10-CM: R09.02  ICD-9-CM: 799.02  10/10/2016        Hand foot syndrome ICD-10-CM: L27.1  ICD-9-CM: 693.0  7/6/2016        Dysgeusia ICD-10-CM: R43.2  ICD-9-CM: 781.1  6/10/2014        Fatigue ICD-10-CM: R53.83  ICD-9-CM: 780.79  10/29/2013        Urinary urgency ICD-10-CM: R39.15  ICD-9-CM: 788.63  10/26/2012    Overview Signed 10/26/2012  2:16 PM by Brittanie Richard     10-26-12 start flomax                   Mr. Dior Solomon is 57-year-old white male well known to us. He is a patient of Dr. Flakita Monroy with history of colon cancer with metastasis to the liver with subsequent liver failure. 10/31 he presented to the emergency department with increasing weakness and confusion over the past several days. [facility] wife reports that he has been incoherent, unable to walk, and having difficulty speaking since last night.  She also reports that he has had worsening jaundice and that his urine has been bloody at times as well as dark yellow. She reports no fever or vomiting.  Over the past 3 days his PO intake has been minimal.  In the past, he has had several lines of chemotherapy with his most recent being Vectibix. He has only received 1 cycle on 10/19/17. He is scheduled to receive his next cycle on 11/2/17. His wife reports that he complained of chest pain earlier today, but upon arriving to the ED he denied chest pain and shortness of breath.     PLAN:  Elevated LFTs/hyperbilirubinemia  - Bilirubin 9.3 today. Seen by GI. Abdominal ultrasound done this morning. No evidence of obstruction (stent in place) thus no indication for ERCP. GI feels due to medications or congestion from intrahepatic mets. Will change rocephin to levaquin and follow  11/2 LFTs improving  11/4 LFTs slowly improving. ALT 58. AST 87. Alk Phos 675. Bili 8.2.    UTI/sepsis  - Repeat lactic acid this morning. Continue vanc. Change rocephin to levaquin. Await cultures  11/02 UCNTD. BC from peripheral and port with GNR. Follow for susceptability  11/03 Stopped Vancomycin. Started cefepime  11/04 WBC 24,000. Continue Cefepime and levaquin    Pleural effusion  - Noted on chest xray. Will have pulmonary evaluate for thoracentesis  11/02 Thoracentesis yesterday -1300    AMS  - Likely multifactorial: liver failure, sepsis. Will check CT head to rule out bleed, metastasis, etc  11/02 CT wnl. AMS improving    Colon cancer with liver mets  - S/p cycle 1 vectibix. Cycle 2 due tomorrow - will hold until clinically improved    Nutrition  11/02  TPN started  11/04  TPN continues    Светлана SOPs  SCDs for DVT prophylaxis                [facility]  [address] [place] [address]  Office : [phone]  Fax : [phone]           Attending Addendum:  I personally evaluated the patient with Inna Kingsley N.P.,  and agree with the assessment, findings and plan as documented. We will continue TPN and ABX.               Neva Zhang MD  [facility]  [address] [address]  Office : [phone]  Fax : [phone]
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
## 2017-11-05 NOTE — PROGRESS NOTES
[facility] Hematology & Oncology        Inpatient Hematology / Oncology Progress Note      Admission Date: 10/31/2017 12:56 PM  Reason for Admission/Hospital Course: Pleural effusion [J90]    24 Hour Events:  Afebrile / Tachycardic   WBC 24.3  Abdomen more distended and tender to touch  Wife at bedside    ROS:  Constitutional: Positive for fatigue/malaise  CV: Negative for chest pain, palpitations, edema. Respiratory: Negative for dyspnea, cough, wheezing. GI: Positive for abdominal pain; negative for nausea; abd distention    10 point review of systems is otherwise negative with the exception of the elements mentioned above in the HPI. Allergies   Allergen Reactions    Oxaliplatin Shortness of Breath, Rash and Other (comments)     Severe abdominal pain       OBJECTIVE:  Patient Vitals for the past 8 hrs:   BP Temp Pulse Resp SpO2   17 1043 122/62 97.3 °F (36.3 °C) (!) 107 22 98 %   17 0702 119/67 97.4 °F (36.3 °C) 96 16 95 %     Temp (24hrs), Av.6 °F (36.4 °C), Min:97.3 °F (36.3 °C), Max:98.3 °F (36.8 °C)         Physical Exam:  Constitutional: Ill-appearing male in no acute distress, lying comfortably in the hospital bed. HEENT: Normocephalic and atraumatic. Oropharynx is clear, mucous membranes are moist. +icteric sclera     Lymph node   Deferred   Skin Warm and dry. No bruising and no rash noted. No erythema. No pallor. Respiratory Lungs are clear to auscultation bilaterally without wheezes, rales or rhonchi, normal air exchange without accessory muscle use. CVS Normal rate, regular rhythm and normal S1 and S2. No murmurs, gallops, or rubs. Abdomen Distended, tender to light touch      Neuro Drowsy, oriented to person   MSK Normal range of motion in general.  No edema and no tenderness. Psych Appropriate mood and affect.         Labs:      Recent Labs      17   0331  17   1113  17   0347   WBC  24.3*  24.0*  16.8*   RBC  3.38*  3.43*  3.07*   HGB 11.5*  11.8*  10.3*   HCT  33.5*  33.8*  30.4*   MCV  99.1*  98.5*  99.0*   MCH  34.0*  34.4*  33.6*   MCHC  34.3  34.9  33.9   RDW  15.4*  15.4*  15.8*   PLT  102*  126*  150   GRANS  91*  90*  90*   LYMPH  3*  3*  4*   MONOS  4  5  4   EOS  1  1  1   BASOS  0  0  0   IG  1  1  1   DF  AUTOMATED  AUTOMATED  AUTOMATED   ANEU  22.1*  21.7*  15.2*   ABL  0.7  0.8  0.6   ABM  1.0  1.1  0.7   DINA  0.2  0.2  0.2   ABB  0.0  0.0  0.0   AIG  0.2  0.2  0.1        Recent Labs      11/05/17   0331  11/04/17   1113  11/03/17 2014 11/03/17   0347   NA  142  142   --   140   K  4.6  4.5   --   4.4   CL  110*  111*   --   109*   CO2  23  23   --   21   AGAP  9  8   --   10   GLU  132*  112*   --   146*   BUN  53*  47*   --   39*   CREA  1.07  1.02   --   0.93   GFRAA  >60  >60   --   >60   GFRNA  >60  >60   --   >60   CA  8.8  8.8   --   8.5   SGOT  56*  65*   --   87*   AP  571*  675*   --   636*   TP  4.6*  4.8*   --   4.6*   ALB  1.1*  1.2*   --   1.2*   GLOB  3.5  3.6*   --   3.4   AGRAT  0.3*  0.3*   --   0.4*   MG  2.3  2.5*  2.5*  2.4   PHOS  4.0*  3.9*  3.1  2.9     Imaging:  US ABD LTD [[id number]] Collected: 11/01/17 1014      Order Status: Completed Updated: 11/01/17 1019     Narrative:       Right upper quadrant abdominal ultrasound    History: Jaundice, h/o biliary stricture, metastatic colon cancer, 75 years Male    Comparison: Abdominal ultrasound October 14, 2017, CT torso September 20, 2017. Findings: The liver is heterogeneous in echotexture and there is no dilatation  of the intrahepatic ducts.  Again visualized is a small hypoechoic mass adjacent  to the right hepatic lobe which may be subcapsular or subserosal measuring 2.2 x  1.4 x 1.4 cm, consistent with a metastatic deposit as seen on prior CT torso. The liver measures 14 cm in midclavicular length.  Normal hepatopedal flow  within the main portal vein.  The gallbladder is surgically absent.  The proximal  common duct is not dilated and measures 7 mm, containing a biliary stent.  The  right kidney is normal appearing and measures 8.3 cm in length.  Pancreas not  well-visualized due to overlying bowel gas. The visualized portions of the IVC  are unremarkable.  And aorta not visualized due to overlying bowel gas.  Small  volume ascites unchanged.  Small right pleural effusion unchanged.       Impression:       Impression:     1.  No significant interval change in hepatic serosal metastatic deposits, small  volume ascites, and small right pleural effusion. 2.  Other chronic findings as above.     XR ABD (KUB) [[id number]] Collected: 11/01/17 0900     Order Status: Completed Updated: 11/01/17 0903     Narrative:       KUB supine views    History:  decreased bowel sounds, abdominal pain, 75 years Male    Comparison:  Chest radiograph October 13, 2016, yesterday    Findings: Biliary stent appears in anatomic position.  No free air is evident. The bowel gas pattern is nonspecific and there is no evidence of ileus or  obstruction.  No suspicious renal or ureteral calculi are evident.  Small right  and trace left pleural effusions relatively unchanged since yesterday. Visualized osseous structures unremarkable.       Impression:       Impression: No acute intra-abdominal pathology identified.             XR CHEST PORT [[id number]] Collected: 10/31/17 1324     Order Status: Completed Updated: 10/31/17 1329     Narrative:       1 View portable chest x-ray 10/31/2017 1:21 PM    Indication: Chest pressure pain. Patient has history of colon cancer. Comparison: CT chest 9/20/2017    Findings: This portable upright AP chest of 1304 shows the lungs somewhat  underventilated. An implanted venous port does remain in place on the left. The  heart is not significantly enlarged. There is clearly increased reticular and  hazy opacities on the right with the hemidiaphragm and costophrenic angle  obscured.  The left lung appears overall still clear.       Impression:       IMPRESSION: Probably there is now a large right pleural effusion with associated  atelectasis. ASSESSMENT:    Problem List  Date Reviewed: 11/3/2017          Codes Class Noted    Pleural effusion on right ICD-10-CM: J90  ICD-9-CM: 511.9  11/1/2017    Overview Signed 11/3/2017  3:59 PM by Cony Husbands, NP     11/1/17Right thoracentesis:  1300 cc of bright yellow fluid removed             Altered mental state ICD-10-CM: R41.82  ICD-9-CM: 780.97  10/31/2017        Failure to thrive (0-17) (Chronic) ICD-10-CM: R62.51  ICD-9-CM: 783.41  10/31/2017        Acute kidney injury [facility]) ICD-10-CM: N17.9  ICD-9-CM: 584.9  10/31/2017        * (Principal)Colon cancer metastasized to [facility]) (Chronic) ICD-10-CM: C18.9, C78.7  ICD-9-CM: 153.9, 197.7  10/31/2017        Hyperbilirubinemia ICD-10-CM: E80.6  ICD-9-CM: 782.4  10/9/2017        Acute liver failure without hepatic coma ICD-10-CM: K72.00  ICD-9-CM: 570  10/9/2017        Other constipation ICD-10-CM: K59.09  ICD-9-CM: 564.09  10/9/2017        Acute liver failure ICD-10-CM: K72.00  ICD-9-CM: 534  10/9/2017        Small bowel obstruction ICD-10-CM: J44.324  ICD-9-CM: 560.9  6/5/2017        Dehydration ICD-10-CM: E86.0  ICD-9-CM: 276.51  6/2/2017        Malignant neoplasm of colon ([address] (Chronic) ICD-10-CM: C18.9  ICD-9-CM: 153.9  10/10/2016        Metastatic colon cancer to [facility]) (Chronic) ICD-10-CM: C18.9, C78.7  ICD-9-CM: 153.9, 197.7  10/10/2016        Hypoxemia ICD-10-CM: R09.02  ICD-9-CM: 799.02  10/10/2016        Hand foot syndrome ICD-10-CM: L27.1  ICD-9-CM: 693.0  7/6/2016        Dysgeusia ICD-10-CM: R43.2  ICD-9-CM: 781.1  6/10/2014        Fatigue ICD-10-CM: R53.83  ICD-9-CM: 780.79  10/29/2013        Urinary urgency ICD-10-CM: R39.15  ICD-9-CM: 788.63  10/26/2012    Overview Signed 10/26/2012  2:16 PM by Shai Hubbard     10-26-12 start flomax                   Mr. Shellie Sampson is 54-year-old white male well known to us.   He is a patient of Dr. Tonya Calderon with history of colon cancer with metastasis to the liver with subsequent liver failure. 10/31 he presented to the emergency department with increasing weakness and confusion over the past several days. [facility]OWN wife reports that he has been incoherent, unable to walk, and having difficulty speaking since last night.  She also reports that he has had worsening jaundice and that his urine has been bloody at times as well as dark yellow. She reports no fever or vomiting.  Over the past 3 days his PO intake has been minimal.  In the past, he has had several lines of chemotherapy with his most recent being Vectibix. He has only received 1 cycle on 10/19/17. He is scheduled to receive his next cycle on 11/2/17. His wife reports that he complained of chest pain earlier today, but upon arriving to the ED he denied chest pain and shortness of breath.     PLAN:  Elevated LFTs/hyperbilirubinemia  - Bilirubin 9.3 today. Seen by GI. Abdominal ultrasound done this morning. No evidence of obstruction (stent in place) thus no indication for ERCP. GI feels due to medications or congestion from intrahepatic mets. Will change rocephin to levaquin and follow  11/2 LFTs improving  11/4 LFTs slowly improving. ALT 58. AST 87. Alk Phos 675. Bili 8.2.    UTI/sepsis  - Repeat lactic acid this morning. Continue vanc. Change rocephin to levaquin. Await cultures  11/02 UCNTD. BC from peripheral and port with GNR. Follow for susceptability  11/03 Stopped Vancomycin. Started cefepime  11/04 WBC 24,000. Continue Cefepime and levaquin  11/05 WBC 24.3. Continue ABTs    Pleural effusion  - Noted on chest xray. Will have pulmonary evaluate for thoracentesis  11/02 Thoracentesis yesterday -1300    AMS  - Likely multifactorial: liver failure, sepsis. Will check CT head to rule out bleed, metastasis, etc  11/02 CT wnl. AMS improving    Colon cancer with liver mets  - S/p cycle 1 vectibix.  Cycle 2 due tomorrow - will hold until clinically improved  -11/05  Abdomen distended. CT CAP w/ contrast today. Nutrition  11/02  TPN started  11/04  TPN continues  11/05  Continue TPN    Светлана SOPs  SCDs for DVT prophylaxis                [facility]  [address] [place] [address]  Office : [phone]  Fax : [phone]           Attending Addendum:  I personally evaluated the patient with Amy Maldonado N.P.,  and agree with the assessment, findings and plan as documented. Continue ABX and TPN, he is scheduled to undergo CT scans.               Brennon Mejia MD  [address] [place] [address]  [id number] [place] [address]  Office : [phone]  Fax : [phone]

## 2017-11-06 NOTE — PROGRESS NOTES
END OF SHIFT NOTE:    Intake/Output  11/05 1901 - 11/06 0700  In: 2251 [I.V.:2251]  Out: -    Voiding: YES  Catheter: NO  Drain:              Stool:  0 occurrences. Emesis:  0 occurrences. VITAL SIGNS  Patient Vitals for the past 12 hrs:   Temp Pulse Resp BP SpO2   11/06/17 0240 97.6 °F (36.4 °C) (!) 117 18 145/81 96 %   11/06/17 0023 98.1 °F (36.7 °C) (!) 114 18 134/63 94 %       Pain Assessment  Pain 1  Pain Scale 1: Visual (11/05/17 1014)  Pain Intensity 1: 0 (11/05/17 1014)  Patient Stated Pain Goal: 0 (11/05/17 1014)  Pain Reassessment 1: Yes (11/05/17 1014)  Pain Onset 1:  (left leg seemed to cause him a lot of pain with movement.) (11/03/17 1349)  Pain Location 1: Abdomen (11/05/17 0935)  Pain Description 1: Constant (11/03/17 0409)  Pain Intervention(s) 1: Medication (see MAR) (11/05/17 0935)    Ambulating  No    Additional Information: Pt with s/sx of discomfort; occasional moans and grimacing and restlessness. Pt unable to verbalize exactly where discomfort was or describe it. RN administered tramadol prn as prescribed. Pt was able to swallow tablet w/ much coaching and prompting. Pt would not use straw. Pt would take sips from cup but would hold liquid inside of his mouth. RN spent 20-25 minutes w/pt until he swallowed the tramadol. Shift report given to oncoming nurse at the bedside.     Jonathan Gonzalez RN
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
## 2017-11-06 NOTE — PROGRESS NOTES
OT NOTE:    Charts reviewed, tx session attempted. Pt off the floor this PM upon attempt. Will re-attempt at later time/date.      Thanks,    Tayler Burrell, OTR/L

## 2017-11-06 NOTE — PROGRESS NOTES
3,100ml yellow ascitic fluid drawn off during paracentesis. Patient tolerated well. Vitals documented.

## 2017-11-06 NOTE — PROGRESS NOTES
[facility]        Inpatient Hematology / Oncology Progress Note      Admission Date: 10/31/2017 12:56 PM  Reason for Admission/Hospital Course: Pleural effusion [J90]    24 Hour Events:  Afebrile / Tachycardic   WBC up to 28.7  Abdomen more distended and tender to touch - paracentesis ordered  Wife at bedside    ROS:  Constitutional: Positive for fatigue/malaise  CV: Negative for chest pain, palpitations, edema. Respiratory: Negative for dyspnea, cough, wheezing. GI: Positive for abdominal pain and distention; negative for nausea    10 point review of systems is otherwise negative with the exception of the elements mentioned above in the HPI. Allergies   Allergen Reactions    Oxaliplatin Shortness of Breath, Rash and Other (comments)     Severe abdominal pain       OBJECTIVE:  Patient Vitals for the past 8 hrs:   BP Temp Pulse Resp SpO2   17 0915 122/69 96.7 °F (35.9 °C) (!) 106 22 93 %   17 0733 113/71 97.1 °F (36.2 °C) (!) 110 18 95 %   17 0240 145/81 97.6 °F (36.4 °C) (!) 117 18 96 %     Temp (24hrs), Av.4 °F (36.3 °C), Min:96.7 °F (35.9 °C), Max:98.1 °F (36.7 °C)         Physical Exam:  Constitutional: Ill-appearing male in no acute distress, lying comfortably in the hospital bed. HEENT: Normocephalic and atraumatic. Oropharynx is clear, mucous membranes are moist. +icteric sclera     Lymph node   Deferred   Skin Warm and dry. No bruising and no rash noted. No erythema. No pallor. +Icterus   Respiratory Lungs are clear to auscultation bilaterally without wheezes, rales or rhonchi, normal air exchange without accessory muscle use. CVS Tachycardic rate, regular rhythm and normal S1 and S2. No murmurs, gallops, or rubs. Abdomen Distended, tender to light touch      Neuro Drowsy, oriented to person   MSK Normal range of motion in general.  No edema and no tenderness.    Psych Lethargic       Labs:      Recent Labs      17   0246  17 3817  11/04/17   1113   WBC  28.7*  24.3*  24.0*   RBC  3.52*  3.38*  3.43*   HGB  12.0*  11.5*  11.8*   HCT  34.2*  33.5*  33.8*   MCV  97.2  99.1*  98.5*   MCH  34.1*  34.0*  34.4*   MCHC  35.1*  34.3  34.9   RDW  15.4*  15.4*  15.4*   PLT  92*  102*  126*   GRANS  87*  91*  90*   LYMPH  4*  3*  3*   MONOS  7  4  5   EOS  1  1  1   BASOS  0  0  0   IG  1  1  1   DF  AUTOMATED  AUTOMATED  AUTOMATED   ANEU  25.2*  22.1*  21.7*   ABL  1.1  0.7  0.8   ABM  1.9*  1.0  1.1   DINA  0.2  0.2  0.2   ABB  0.1  0.0  0.0   AIG  0.3  0.2  0.2        Recent Labs      11/06/17   0246  11/05/17   0331  11/04/17   1113  11/03/17 2014   NA  142  142  142   --    K  4.5  4.6  4.5   --    CL  110*  110*  111*   --    CO2  20*  23  23   --    AGAP  12  9  8   --    GLU  81  132*  112*   --    BUN  67*  53*  47*   --    CREA  1.53*  1.07  1.02   --    GFRAA  59*  >60  >60   --    GFRNA  48*  >60  >60   --    CA  9.0  8.8  8.8   --    SGOT  58*  56*  65*   --    AP  603*  571*  675*   --    TP  4.9*  4.6*  4.8*   --    ALB  1.1*  1.1*  1.2*   --    GLOB  3.8*  3.5  3.6*   --    AGRAT  0.3*  0.3*  0.3*   --    MG   --   2.3  2.5*  2.5*   PHOS   --   4.0*  3.9*  3.1     Imaging:  US ABD LTD [[id number]] Collected: 11/01/17 1014      Order Status: Completed Updated: 11/01/17 1019     Narrative:       Right upper quadrant abdominal ultrasound    History: Jaundice, h/o biliary stricture, metastatic colon cancer, 75 years Male    Comparison: Abdominal ultrasound October 14, 2017, CT torso September 20, 2017. Findings: The liver is heterogeneous in echotexture and there is no dilatation  of the intrahepatic ducts.  Again visualized is a small hypoechoic mass adjacent  to the right hepatic lobe which may be subcapsular or subserosal measuring 2.2 x  1.4 x 1.4 cm, consistent with a metastatic deposit as seen on prior CT torso.    The liver measures 14 cm in midclavicular length.  Normal hepatopedal flow  within the main portal vein.  The gallbladder is surgically absent. The proximal  common duct is not dilated and measures 7 mm, containing a biliary stent.  The  right kidney is normal appearing and measures 8.3 cm in length.  Pancreas not  well-visualized due to overlying bowel gas. The visualized portions of the IVC  are unremarkable.  And aorta not visualized due to overlying bowel gas.  Small  volume ascites unchanged.  Small right pleural effusion unchanged.       Impression:       Impression:     1.  No significant interval change in hepatic serosal metastatic deposits, small  volume ascites, and small right pleural effusion. 2.  Other chronic findings as above.     XR ABD (KUB) [[id number]] Collected: 11/01/17 0900     Order Status: Completed Updated: 11/01/17 0903     Narrative:       KUB supine views    History:  decreased bowel sounds, abdominal pain, 75 years Male    Comparison:  Chest radiograph October 13, 2016, yesterday    Findings: Biliary stent appears in anatomic position.  No free air is evident. The bowel gas pattern is nonspecific and there is no evidence of ileus or  obstruction.  No suspicious renal or ureteral calculi are evident.  Small right  and trace left pleural effusions relatively unchanged since yesterday. Visualized osseous structures unremarkable.       Impression:       Impression: No acute intra-abdominal pathology identified.             XR CHEST PORT [[id number]] Collected: 10/31/17 1324     Order Status: Completed Updated: 10/31/17 1329     Narrative:       1 View portable chest x-ray 10/31/2017 1:21 PM    Indication: Chest pressure pain. Patient has history of colon cancer. Comparison: CT chest 9/20/2017    Findings: This portable upright AP chest of 1304 shows the lungs somewhat  underventilated. An implanted venous port does remain in place on the left. The  heart is not significantly enlarged.  There is clearly increased reticular and  hazy opacities on the right with the hemidiaphragm and costophrenic angle  obscured. The left lung appears overall still clear.       Impression:       IMPRESSION: Probably there is now a large right pleural effusion with associated  atelectasis.          ASSESSMENT:    Problem List  Date Reviewed: 11/3/2017          Codes Class Noted    Pleural effusion on right ICD-10-CM: J90  ICD-9-CM: 511.9  11/1/2017    Overview Signed 11/3/2017  3:59 PM by Krista Farmer NP     11/1/17Right thoracentesis:  1300 cc of bright yellow fluid removed             Altered mental state ICD-10-CM: R41.82  ICD-9-CM: 780.97  10/31/2017        Failure to thrive (0-17) (Chronic) ICD-10-CM: R62.51  ICD-9-CM: 783.41  10/31/2017        Acute kidney injury [facility]) ICD-10-CM: N17.9  ICD-9-CM: 584.9  10/31/2017        * (Principal)Colon cancer metastasized to [facility]) (Chronic) ICD-10-CM: C18.9, C78.7  ICD-9-CM: 153.9, 197.7  10/31/2017        Hyperbilirubinemia ICD-10-CM: E80.6  ICD-9-CM: 782.4  10/9/2017        Acute liver failure without hepatic coma ICD-10-CM: K72.00  ICD-9-CM: 570  10/9/2017        Other constipation ICD-10-CM: K59.09  ICD-9-CM: 564.09  10/9/2017        Acute liver failure ICD-10-CM: K72.00  ICD-9-CM: 715  10/9/2017        Small bowel obstruction ICD-10-CM: V99.523  ICD-9-CM: 560.9  6/5/2017        Dehydration ICD-10-CM: E86.0  ICD-9-CM: 276.51  6/2/2017        Malignant neoplasm of colon ([facility] [address] (Chronic) ICD-10-CM: C18.9  ICD-9-CM: 153.9  10/10/2016        Metastatic colon cancer to [facility]) (Chronic) ICD-10-CM: C18.9, C78.7  ICD-9-CM: 153.9, 197.7  10/10/2016        Hypoxemia ICD-10-CM: R09.02  ICD-9-CM: 799.02  10/10/2016        Hand foot syndrome ICD-10-CM: L27.1  ICD-9-CM: 693.0  7/6/2016        Dysgeusia ICD-10-CM: R43.2  ICD-9-CM: 781.1  6/10/2014        Fatigue ICD-10-CM: R53.83  ICD-9-CM: 780.79  10/29/2013        Urinary urgency ICD-10-CM: R39.15  ICD-9-CM: 788.63  10/26/2012    Overview Signed 10/26/2012  2:16 PM by Todd Michelle     10-26-12 start flomax                    Litzy Friedman is 18-year-old white male well known to us. He is a patient of Dr. Angela Roman with history of colon cancer with metastasis to the liver with subsequent liver failure. 10/31 he presented to the emergency department with increasing weakness and confusion over the past several days. [facility] wife reports that he has been incoherent, unable to walk, and having difficulty speaking since last night.  She also reports that he has had worsening jaundice and that his urine has been bloody at times as well as dark yellow. She reports no fever or vomiting.  Over the past 3 days his PO intake has been minimal.  In the past, he has had several lines of chemotherapy with his most recent being Vectibix. He has only received 1 cycle on 10/19/17. He is scheduled to receive his next cycle on 11/2/17. His wife reports that he complained of chest pain earlier today, but upon arriving to the ED he denied chest pain and shortness of breath.     PLAN:  Elevated LFTs/hyperbilirubinemia  - Bilirubin 9.3 today. Seen by GI. Abdominal ultrasound done this morning. No evidence of obstruction (stent in place) thus no indication for ERCP. GI feels due to medications or congestion from intrahepatic mets. Will change rocephin to levaquin and follow  11/2 LFTs improving  11/4 LFTs slowly improving. ALT 58. AST 87. Alk Phos 675. Bili 8.2.  11/6 Bili up to 8.2 from 7.7 yesterday. LFTs improving except for AlkP. Per GI, no need to check liver panel daily. Will change to weekly as suggested. GI will f/u OP for stent exchange. Check ammonia level. UTI/sepsis  - Repeat lactic acid this morning. Continue vanc. Change rocephin to levaquin. Await cultures  11/02 UCNTD. BC from peripheral and port with GNR. Follow for susceptability  11/03 Stopped Vancomycin. Started cefepime  11/04 WBC 24,000. Continue Cefepime and levaquin  11/05 WBC 24.3. Continue ABTs  11/6 Remains afebrile. WBC up to 28.7. BCx positive for anaerobic GNR. I/S pending. DC LVQ. Add flagyl. Pleural effusion  - Noted on chest xray. Will have pulmonary evaluate for thoracentesis  11/02 Thoracentesis yesterday -1300    AMS  - Likely multifactorial: liver failure, sepsis. Will check CT head to rule out bleed, metastasis, etc  11/02 CT wnl. AMS improving  11/6 Per wife, AMS is worsening. Only oriented to person. Check ammonia level. Colon cancer with liver mets  - S/p cycle 1 vectibix. Cycle 2 due tomorrow - will hold until clinically improved  -11/05  Abdomen distended. CT CAP w/ contrast today. 11/6 Radiology unable to do CT due to worsening kidney function. Hold for now. Consult IR for paracentesis for worsening ascites. Send for cytology, cell count with diff, culture, LDH, and protein. Nutrition/Dysphagia  11/02  TPN started  11/04  TPN continues  11/05  Continue TPN  11/6 Per RN, pt having difficulty swallowing pills. Consult [facility]  11/6 Cr up to 1.53 today. Not currently on IVF. Start gentle hydration    Continue home meds  Светлана SOPs  SCDs for DVT prophylaxis                Soledad Vásquez, PA[facility]TON [facility]  [id number] [place] [address]  Office : [phone]  Fax : [phone]         Attending Addendum:  I personally evaluated the patient with Soledad Vásquez N.P.,  and agree with the assessment, findings and plan as documented. Appears stable, heart regular without murmur, lungs clear, abdomen benign. Gentle hydration. Plan for paracentesis.                Praneeth Rios MD  [facility]  [address] [address]  Office : [phone]  Fax : [phone]

## 2017-11-07 NOTE — PROGRESS NOTES
OT NOTE:    Charts reviewed, tx session attempted. Pt is not alert enough to safely participate with therapy at this time. Will re-attempt at later time/date as schedule permits.     Thanks,    Tayler Burrell, OTR/L

## 2017-11-07 NOTE — PROGRESS NOTES
[facility]        Subjective: MICHAEL  Renal consult dictated # [id number]    Review of Systems -   Can not be obtained due to pt's condition. Objective:    Vitals:    11/07/17 0323 11/07/17 0734 11/07/17 0735 11/07/17 1125   BP: 131/70 124/69  141/76   Pulse: (!) 110 (!) 109  (!) 108   Resp: 16 18  18   Temp: 97.5 °F (36.4 °C) 96.7 °F (35.9 °C)  97.2 °F (36.2 °C)   SpO2: 93% 93% 92% 92%   Weight:       Height:           PE  Gen:jaundiced, poorly responsive  CV: reg rate  Chest:clear  Abd: soft  Ext/Access: no edema     . LAB  Recent Labs      11/06/17   0246  11/05/17   0331   WBC  28.7*  24.3*   HGB  12.0*  11.5*   HCT  34.2*  33.5*   PLT  92*  102*     Recent Labs      11/07/17   0324  11/06/17   1233  11/06/17   0246  11/05/17   0331   NA  142   --   142  142   K  5.1   --   4.5  4.6   CL  110*   --   110*  110*   CO2  21   --   20*  23   GLU  116*   --   81  132*   BUN  84*   --   67*  53*   CREA  1.91*   --   1.53*  1.07   MG   --   2.5*  2.4  2.3   PHOS   --    --   4.5*  4.0*   CA  9.1   --   9.0  8.8   ALB   --    --   1.1*  1.1*   TBILI   --    --   8.2*  7.7*   ALT   --    --   38  42   SGOT   --    --   58*  56*           Radiology    A/P:   Patient Active Problem List   Diagnosis Code    Urinary urgency R39.15    Fatigue R53.83    Dysgeusia R43.2    Hand foot syndrome L27.1    Malignant neoplasm of colon (HCC) C18.9    Metastatic colon cancer to liver (HCC) C18.9, C78.7    Hypoxemia R09.02    Dehydration E86.0    Small bowel obstruction K56.609    Hyperbilirubinemia E80.6    Acute liver failure without hepatic coma K72.00    Other constipation K59.09    Acute liver failure K72.00    Altered mental state R41.82    Failure to thrive (0-17) R62.51    Acute kidney injury (HCC) N17.9    Colon cancer metastasized to liver (HCC) C18.9, C78.7    Pleural effusion on right J90         The high BUN/Creat ratio suggests pre-renal vs possible hepato-renal syndrome.   Will check UA, Urine adonis and will follow with you.       Marcos Valencia MD
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
## 2017-11-07 NOTE — CONSULTS
[facility] [address]       Name:  Parveen Tapia   MR#:  [id number]   :  1950   Account #:  [de-identified]   Date of Adm:  10/31/2017       REASON FOR CONSULTATION: We are seeing the patient at the   request of Dr. Bryant Yoo with regard to acute kidney injury. HISTORY OF PRESENT ILLNESS: The patient is an unfortunate 75  year-old  male who was admitted to [address] [place] [address] on 10/31/2017 with progressive weakness and altered   mental status. While here in the hospital, he has been found to   have gram-negative sepsis and has been treated with IV   antibiotics for this. He has also been receiving IV hydration in   and TPN. Despite therapy, his condition appears to be   deteriorating and he has developed worsening renal function. The   history is obtained from the chart as the patient is poorly   responsive and there are no family members present. In terms of   his renal function, he has had normal renal function on   admission and up through about the past 24-48 hours. His   baseline creatinine appears to be in the 0.8 to 1.2 range. On   2017 his BUN was 53, creatinine was 1.07. Yesterday he had a   BUN of 67, creatinine 1.53. Today, he has a sodium of 142,   potassium 5.1, chloride 110, CO2 is 21, with an anion gap of 11,   blood sugar 116, BUN is 84, creatinine is 1.91 with a calcium of   9.1. His urinalysis on admission on 10/31/2017 showed a urine   specific gravity of 10/28/2017 with 300 mg/dL of protein, urine   chemistries are noncontributory. An abdominal ultrasound done on   2017 was limited to the right upper quadrant, but did   suggest that the right kidney was 8.3 cm in length, which I   believe is a little on the small side. The patient has no prior   history of any kidney disease. PAST MEDICAL HISTORY: Significant for stage IV colon cancer   which was diagnosed in the fall of .  He has been treated   with palliative FOLFOX/Avastin therapy, for which he received 6   cycles of therapy. In 10/2016 he underwent resection of some   hepatic metastases and sigmoid colectomy as part of his therapy. More recently, it seems like he has received at least 1 course   of panitumumab therapy for his colon cancer that I believe this   was on 10/19/2017. He was scheduled to receive another dose in the   past week, but this has been put on hold due to his   deteriorating condition. The patient also has a history of   hepatic failure. He has had progressive jaundice over the past   month and had an ERCP with a common hepatic duct stricture for   which he received a sphincterotomy and placement of a plastic   biliary stent. GI has been following and they feel that he has   some cholestasis but that his stent is working properly. We have   been asked to see him because of his worsening azotemia over the   past 24 hours. Review of the labs showed that over the past 2   days he has had no clear-cut hypotensive episodes. ALLERGIES: HIS ONLY KNOWN DRUG ALLERGY IS TO OXALIPLATIN WHICH   CAUSES SHORTNESS OF BREATH AND A RASH. CURRENT MEDICATIONS: Include:    1. Maxipime 2 grams IV q.12 hours. 2. He is on TPN. 3. He is on Flagyl 500 mg IV q.12 hours. 4. Protonix 40 mg daily. 5. He is receiving IV normal saline at 75 mL per hour. SOCIAL HISTORY: He is . He is a retired . He has   never smoked and does not drink any alcohol. FAMILY HISTORY: Positive for lung cancer in his mother,   otherwise is noncontributory. REVIEW OF SYSTEMS   Cannot be obtained as the patient is obtunded. PHYSICAL EXAMINATION   GENERAL: Reveals a jaundiced, chronically ill-appearing    male who is poorly responsive. VITAL SIGNS: He is afebrile. Blood pressure is 141/76, pulse is   108, respirations are 18, they are not labored. HEAD: Normocephalic. EYES: Pupils equal, react no reactive to light and   accommodation.  Extraocular muscles are intact. The sclerae are   jaundiced. LUNGS: His bilateral breath sounds appear to be clear. HEART: Regular rate and rhythm. ABDOMEN: Somewhat distended with ascites. GENITAL/RECTAL: Exam was deferred. EXTREMITIES: He has no peripheral edema. IMPRESSION: Acute kidney injury. With the elevated BUN to   creatinine ratio, I suspect prerenal versus possibly hepatorenal   syndrome. His Vectibix that he received was about a month ago   and I do not think that would have caused acute kidney injury in   the past 24-48 hours. PLANS AND RECOMMENDATIONS: We will get a Jordan catheter placed   so we can keep more accurate idea of his urinary output, we will   check urine electrolytes and daily labs. His overall condition   appears to be deteriorating despite maximum therapy. I am not   sure if his numbers got bad enough, if he would benefit from   having his life artificially prolonged with dialysis or not. Thank you very much for allowing us to see him and helping in   his care, will follow with you.         MD Jemima Mejia / Candido Pickering   D:  11/07/2017   12:01   T:  11/07/2017   12:58   Job #:  [id number]

## 2017-11-07 NOTE — PROGRESS NOTES
Tanja Fisher in radiology  called and stated they had an emergency and was unable to get to patient today. Spoke with NP, Rema De Jesus, and she stated that it was ok to hold off on placing NG tube until morning. Radiology stated they would get to patient first thing in the morning. Will let oncoming nurse know of situation  and that NG tube needs to be placed first thing in the morning.

## 2017-11-07 NOTE — PROGRESS NOTES
Problem: Nutrition Deficit  Goal: *Optimize nutritional status  Nutrition F/U  Reason for assessment: TPN/PPN management per nutritional support protocols  Order received: \"DC parental feeding.  Order placed for NGT.  OK to proceed with tube feeding\" (Hematology and Oncology)  Assessment:   Diet order(s): Regular then CLQ 10/31, NPO 11/6  Food/Nutrition History:  Patient continues on TPN d/t Intrahepatic cholestasis. SLP recommends NPO yesterday. He had ~3.1 L removed during paracentesis yesterday. Abdomen noted as distended today with active bowel sounds, date of last BM: 11/3. Patient has now developed MICHAEL, worsening renal function with ? pre-renal vs possible hepato-renal syndrome. Plan to start enteral nutrition today. Central line access : Port. Labs are remarkable for K trending up to 5.1 with minimal K in TPN. TPN has remained phosphorus, magnesium and calcium free since 11/4. BUN trending up to 84 and Cr 1.91. Anthropometrics:Height: 5' 8\" (172.7 cm), Weight Source: Standing scale (11/6, 5th floor), Weight: 88.2 kg  Edema: Trace to BLEs  Macronutrient needs:*Revised protein needs   EER:  0806-9908 kcal /day (20-25 kcal/kg listed BW)  EPR:  56-84 grams protein/day (0.8-1.2 grams/kg IBW)  Max CHO:  403 grams/day (4mg/kg IBW/min)-TPN  Max CHO: 250 grams/day (50% kcal)-TF  Fluid:  1ml/kcal  Intake/Comparative Standards: Current TPN: 15%DEX/ 5%AA at 45 ml/hr with 500 ml 20% Lipids daily provides  1710 kcal/d (100% of needs), 50 grams of protein/d (71% of needs), 150 grams of CHO/d (does not exceed maximum CHO load) and 2000 ml of total volume/d ( 100% of needs). Intervention:  Meals and snacks: NPO  Nutritional Supplement Therapy: Electrolyte replacement per nutritional support protocols are active on MAR. PN/IVF: Discontinue TPN and 500 ml 20% lipids at 1759. Provide 250 ml 20% lipids to provide 500 kcal in the event that the patient does not tolerate NGT feedings.     IVF: per MD, continue at 75ml/hr. EN: Recommend placement of NJT vs NGT given distention. Start TF of Nepro at 10 ml/hr, increase by 10 ml q8 to goal of 30 ml/hr to provide 1296 kcal (81% kcal needs), 58 g PRO (100% protein needs), 120 g CHO (does not exceed max CHO load) and 522 ml fluid for a total of 1002 ml fluid (1252 ml fluid with TF + lipids). Will receive a total of 1796 kcal (100% kcal needs) with TPN + lipids. Coordination of care: Discussed patient with Kirstie Neumann NP  *Addendum: Spoke with Kirstie Neumann and Seble Luna NP, about order NJT vs NGT. Plan for NJT placement.   Discussed with Anuel Louie [address], [address] [place] [address], , [phone]

## 2017-11-07 NOTE — PROGRESS NOTES
END OF SHIFT NOTE:    Intake/Output      Voiding: YES  Catheter: NO  Drain:              Stool:  0 occurrences. Emesis:  0 occurrences. VITAL SIGNS  Patient Vitals for the past 12 hrs:   Temp Pulse Resp BP SpO2   11/07/17 0735 - - - - 92 %   11/07/17 0734 96.7 °F (35.9 °C) (!) 109 18 124/69 93 %   11/07/17 0323 97.5 °F (36.4 °C) (!) 110 16 131/70 93 %   11/06/17 2320 96.9 °F (36.1 °C) (!) 105 26 126/59 94 %   11/06/17 2000 96.9 °F (36.1 °C) (!) 106 22 107/85 93 %       Pain Assessment  Pain 1  Pain Scale 1: FLACC (11/06/17 1110)  Pain Intensity 1: 0 (11/06/17 0019)  Patient Stated Pain Goal: Unable to verbalize/indicatate pain (11/05/17 2320)  Pain Reassessment 1: Patient sleeping (11/06/17 0019)  Pain Onset 1:  (left leg seemed to cause him a lot of pain with movement.) (11/03/17 1349)  Pain Location 1:  (unable to verbalize/indicate) (11/05/17 2320)  Pain Orientation 1:  (unable to verbalize/indicate) (11/05/17 2320)  Pain Description 1:  (unable to verbalize/indicate) (11/05/17 2320)  Pain Intervention(s) 1: Medication (see MAR) (11/05/17 2320)    Ambulating  No    Additional Information:     Shift report given to oncoming nurse at the bedside.     Radha Green, RN

## 2017-11-07 NOTE — PROGRESS NOTES
PT note: Attempted PT treatment this morning however pt is sound asleep and does not waken to PT voice, to touch, or sternal rub. Nursing aware. Will check back later time/date as schedule allows.     Thanks,  REBECCA PoseyT

## 2017-11-07 NOTE — PROGRESS NOTES
END OF SHIFT NOTE:    Pt transferred to IR today. Paracentesis completed with 3100 ml yellow amniotic fluid drawn  from paracentesis. Medications switched to IV related to inability to swallow pills. Unable to access orientation. NPO status at this time    Intake/Output      Voiding: YES  Catheter: NO  Drain:              Stool:  0 occurrences. Emesis:  0 occurrences. VITAL SIGNS  Patient Vitals for the past 12 hrs:   Temp Pulse Resp BP SpO2   11/06/17 1550 97 °F (36.1 °C) (!) 105 16 130/71 95 %   11/06/17 1434 - 91 18 123/62 94 %   11/06/17 1415 - (!) 126 20 98/51 94 %   11/06/17 1105 97.1 °F (36.2 °C) (!) 111 14 114/78 93 %   11/06/17 0915 96.7 °F (35.9 °C) (!) 106 22 122/69 93 %       Pain Assessment  Pain 1  Pain Scale 1: FLACC (11/06/17 1110)  Pain Intensity 1: 0 (11/06/17 0019)  Patient Stated Pain Goal: Unable to verbalize/indicatate pain (11/05/17 2320)  Pain Reassessment 1: Patient sleeping (11/06/17 0019)  Pain Onset 1:  (left leg seemed to cause him a lot of pain with movement.) (11/03/17 1349)  Pain Location 1:  (unable to verbalize/indicate) (11/05/17 2320)  Pain Orientation 1:  (unable to verbalize/indicate) (11/05/17 2320)  Pain Description 1:  (unable to verbalize/indicate) (11/05/17 2320)  Pain Intervention(s) 1: Medication (see MAR) (11/05/17 2320)    Ambulating  No    Additional Information:     Shift report given to oncoming nurse at the bedside.     Rosalinda Cody RN

## 2017-11-08 NOTE — PROGRESS NOTES
Pt resting quietly. No visible s/sx of distress. RR 24 breaths/min. O2 sat at 94% on 3L NC. VSS. Awaiting CXR results.

## 2017-11-08 NOTE — PROGRESS NOTES
Problem: Nutrition Deficit  Goal: *Optimize nutritional status  Acknowledge DNR status and plan to focus on comfort measures, home with hospice or to hospice house. Discussed with Tia Lawrence RN. Will discontinue lipids tonight and respectfully sign off.      Yazmin Abdalla [address], [address] [place] [address], [phone]

## 2017-11-08 NOTE — PROGRESS NOTES
Provider Angelica Ndiaye NP) notified of tan and frothy pink secretions reported by RT during suctioning. CXR portable ordered STAT.

## 2017-11-08 NOTE — PROGRESS NOTES
Pt O2 sat @ 88-89% on 2L NC. Increased coarse sounds bilateral lungs. Pt showing no visible s/sx of distress. O2 increased to 3L via NC. Jael Stewart NP contacted. 40mg IV lasix ordered.  RT to suction pt

## 2017-11-08 NOTE — PROGRESS NOTES
[facility]    Follow-Up on: MICHAEL    HPI: Pt remains unresponsive. Wife at bedside.     ROS:  Unable to obtain    Current Facility-Administered Medications   Medication Dose Route Frequency    cefepime (MAXIPIME) 2 g in 0.9% sodium chloride (MBP/ADV) 100 mL  2 g IntraVENous Q12H    fat emulsion 20% (LIPOSYN, INTRAlipid) infusion 250 mL  250 mL IntraVENous QPM    LORazepam (ATIVAN) injection 1 mg  1 mg IntraVENous Q6H PRN    0.9% sodium chloride infusion  75 mL/hr IntraVENous CONTINUOUS    metroNIDAZOLE (FLAGYL) IVPB premix 500 mg  500 mg IntraVENous Q12H    pantoprazole (PROTONIX) injection 40 mg  40 mg IntraVENous DAILY    morphine injection 2 mg  2 mg IntraVENous Q4H PRN    polyvinyl alcohol (LIQUIFILM TEARS) 1.4 % ophthalmic solution 1 Drop  1 Drop Both Eyes PRN    NUTRITIONAL SUPPORT ELECTROLYTE PRN ORDERS   Does Not Apply PRN    docusate sodium (COLACE) capsule 100 mg  100 mg Oral BID PRN    traMADol (ULTRAM) tablet 50 mg  50 mg Oral Q6H PRN    ondansetron (ZOFRAN) injection 4 mg  4 mg IntraVENous Q6H PRN     Facility-Administered Medications Ordered in Other Encounters   Medication Dose Route Frequency    saline peripheral flush soln 10 mL  10 mL InterCATHeter PRN       Exam:  Vitals:    11/08/17 0404 11/08/17 0515 11/08/17 0720 11/08/17 0730   BP: 97/58  (!) 86/56    Pulse: (!) 114  (!) 114    Resp: 27  28    Temp: 97.6 °F (36.4 °C)  97.4 °F (36.3 °C)    SpO2: 91%  91% 91%   Weight:  88.3 kg (194 lb 10.7 oz)     Height:             Intake/Output Summary (Last 24 hours) at 11/08/17 1054  Last data filed at 11/08/17 0346   Gross per 24 hour   Intake             1928 ml   Output               75 ml   Net             1853 ml     PE:  GEN - sleepy, unresponsive  CV - tachycardic, no murmur, no rub  Lung - clear bilaterally  Abd - soft, nontender  Ext - no edema    Labs  Recent Labs      11/06/17   0246   WBC  28.7*   HGB  12.0*   HCT  34.2*   PLT  92*     Recent Labs      11/08/17   0340 11/07/17   0324  11/06/17   1233  11/06/17   0246   NA  142  142   --   142   K  5.5*  5.1   --   4.5   CL  112*  110*   --   110*   CO2  18*  21   --   20*   BUN  99*  84*   --   67*   CREA  2.69*  1.91*   --   1.53*   GLU  77  116*   --   81   CA  9.1  9.1   --   9.0   MG   --    --   2.5*  2.4   PHOS   --    --    --   4.5*     No results for input(s): PH, PCO2, PO2, PCO2 in the last 72 hours.     Problem List:  Patient Active Problem List    Diagnosis Date Noted    Pleural effusion on right 11/01/2017    Altered mental state 10/31/2017    Failure to thrive (0-17) 10/31/2017    [facility] kidney [facility] ([facility] [address] 10/31/2017    Colon cancer metastasized to liver ([facility] [address] 10/31/2017    Hyperbilirubinemia 10/09/2017    Acute liver failure without hepatic coma 10/09/2017    Other constipation 10/09/2017    Acute liver failure 10/09/2017    Small bowel obstruction 06/05/2017    Dehydration 06/02/2017    Malignant neoplasm of colon ([facility] [address] 10/10/2016    Metastatic colon cancer to liver ([facility] [address] 10/10/2016    Hypoxemia 10/10/2016    Hand foot syndrome 07/06/2016    Dysgeusia 06/10/2014    Fatigue 10/29/2013    Urinary urgency 10/26/2012       Issues Addressed By Nephrology:  MCIHAEL: Suspect hepatorenal physiology  Colon cancer, metastatic    Plan:  BRYSON Schwartz Angers and wife  Will sign off as patient likely headed towards hospice  Family likely to decide on hospice and palliative care

## 2017-11-08 NOTE — PROGRESS NOTES
PT note:    Discussed with nursing and pt is transitioning to hospice and comfort care. Will discharge from PT services at this time.      Thank you,  REBECCA HainesT

## 2017-11-08 NOTE — PROGRESS NOTES
Freddy Moore, RN ICU consulted regarding pt's current condition. Freddy Moore recommended palliative consult, albumin administration, oral care, nose trumpet, and BNP. Recommendations performed (albumin pending BNP results and provider notification). Palliative recommendation will be passed on to oncoming RN.

## 2017-11-08 NOTE — PROGRESS NOTES
Deep suctioned pt for a copious amount of thick tan secretions with pink frothy secretions.  RN to althea HUDSON

## 2017-11-08 NOTE — PROGRESS NOTES
END OF SHIFT NOTE:    Intake/Output      Voiding: Yes  Catheter: Yes  Drain:  No          Stool:  0       Emesis:  0        VITAL SIGNS  Patient Vitals for the past 12 hrs:   Temp Pulse Resp BP SpO2   11/07/17 1511 96.8 °F (36 °C) 96 18 120/59 92 %   11/07/17 1125 97.2 °F (36.2 °C) (!) 108 18 141/76 92 %   11/07/17 0735 - - - - 92 %   11/07/17 0734 96.7 °F (35.9 °C) (!) 109 18 124/69 93 %       Pain Assessment  Pain 1  Pain Scale 1: FLACC (11/06/17 1110)  Pain Intensity 1: 0 (11/06/17 0019)  Patient Stated Pain Goal: Unable to verbalize/indicatate pain (11/05/17 2320)  Pain Reassessment 1: Patient sleeping (11/06/17 0019)  Pain Onset 1:  (left leg seemed to cause him a lot of pain with movement.) (11/03/17 1349)  Pain Location 1:  (unable to verbalize/indicate) (11/05/17 2320)  Pain Orientation 1:  (unable to verbalize/indicate) (11/05/17 2320)  Pain Description 1:  (unable to verbalize/indicate) (11/05/17 2320)  Pain Intervention(s) 1: Medication (see MAR) (11/05/17 2320)    Ambulating  No    Additional Information:     Shift report will be  given to oncoming nurse at the bedside.     Kev Rivera RN

## 2017-11-08 NOTE — PROGRESS NOTES
1027-Sat=88%. Primary RN placed call to MD. See that prog note. Lasix given. Tahira Kramer with resp to suction patient. Only responsive to sternal rub as noted by MD prog note. 1028-Sat is up to 91% on 3 lpm of O2 via NC. Other VSS stable with the exception of tachycardia.
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
## 2017-11-08 NOTE — DISCHARGE SUMMARY
[facility]: Inpatient Hematology / Oncology Discharge Summary Note    Patient ID:  Michael Paula  [id number]  15 y.o.  1950    Admit Date: 10/31/2017    Discharge Date: 11/8/2017    Admission Diagnoses: Pleural effusion [J90]    Discharge Diagnoses:  Principal Diagnosis: Colon cancer metastasized to liver [facility])  Principal Problem:    Colon cancer metastasized to liver ([facility] (10/31/2017)    Active Problems:    Hypoxemia (10/10/2016)      Acute liver failure (10/9/2017)      Altered mental state (10/31/2017)      Failure to thrive (0-17) (10/31/2017)      Acute kidney injury ([facility] [address] (10/31/2017)      Pleural effusion on right (11/1/2017)      Overview: 11/1/17Right thoracentesis:  1300 cc of bright yellow fluid removed      Hospital Course:  Mr Pauline Blount is a 74 yo male with metastatic colon cancer with liver involvement. He was admitted with increasing weakness and confusion over the past several days.  His wife reports that he has been incoherent, unable to walk, and having difficulty speaking. Despite aggressive medical support, he continued to decline over the course of the last week with worsening liver and renal function. He was seen in consult by GI but felt stent placement was appropriate and felt ultimately, hyperbilirubinemia and elevated LFTs all related to metastatic disease in the liver. Family (wife and brother) have agreed to comfort measures and he will be transferred to [facility] this afternoon for further care. He may follow up with our office as needed.       Consults:  IP CONSULT TO GASTROENTEROLOGY  IP CONSULT TO NEPHROLOGY  IP CONSULT TO NEUROLOGY  IP CONSULT TO PULMONOLOGY    Pertinent Diagnostic Studies:   Labs:    Recent Labs      11/06/17   0246   WBC  28.7*   HGB  12.0*   PLT  92*   ANEU  25.2*    Recent Labs      11/08/17   0340  11/07/17   0324  11/06/17   1233  11/06/17   0246   NA  142  142   --   142   K  5.5*  5.1   --   4.5   CL  112*  110*   -- 110*   CO2  18*  21   --   20*   GLU  77  116*   --   81   BUN  99*  84*   --   67*   CREA  2.69*  1.91*   --   1.53*   CA  9.1  9.1   --   9.0   SGOT  84*   --    --   58*   AP  793*   --    --   603*   TP  5.0*   --    --   4.9*   ALB  1.1*   --    --   1.1*   MG   --    --   2.5*  2.4   PHOS   --    --    --   4.5*       Imaging:   XR CHEST SNGL V [[id number]] Collected: 11/07/17 2329     Order Status: Completed Updated: 11/07/17 2332     Narrative:        Portable view of the chest     COMPARISON: November 2, 2017. CLINICAL HISTORY: Dyspnea. FINDINGS:    There are bilateral pleural effusions and associated airspace disease. No  pneumothorax. There is a stable left-sided chest port. Cardiac mediastinal  contour and the surrounding bones are stable.       Impression:       IMPRESSION:    Bilateral pleural effusions and associated airspace disease, likely atelectasis. Overall, no substantial change compared to prior exam.     US RETROPERITONEUM LTD [[id number]] Collected: 11/07/17 2033     Order Status: Completed Updated: 11/07/17 2037     Narrative:       ULTRASOUND OF THE KIDNEYS AND BLADDER    CLINICAL HISTORY:  Acute kidney injury with serosal metastatic disease from  colon cancer. COMPARISON:  CT of September 20, 2017. FINDINGS:  Multiple images from real time ultrasound evaluation of the kidneys  show that they are normal in size, orientation, and echogenicity bilaterally. The right kidney measures 10 point     cm in length while the left measures 10.3 cm.  No definite solid renal mass,  hydronephrosis, stone, or abnormal perinephric collection is seen.  The renal  parenchyma bilaterally is isoechoic with the liver and spleen. The bladder  appears unremarkable as imaged.  Aorta and IVC are unremarkable as imaged.    Complex ascites is again noted.       Impression:       IMPRESSION:      1.  Renal parenchymal relative hyperechogenicity is a nonspecific finding which  may be associated with a number of medical renal diseases. 2.  No evidence of obstruction.         XR INTRO LONG GI TUBE [[id number]]      Order Status: Canceled      XR INSERT NG Blane Keita [[id number]]      Order Status: Canceled      US PARACENTESIS ABD Mitch Loving [[id number]] Resulted: 11/06/17 1450     Order Status: No result Updated: 11/06/17 1451     IR PARACENTESIS ABD W IMAGE [[id number]]      Order Status: Canceled      CT CHEST ABD PELV W CONT [[id number]]      Order Status: No result      XR CHEST PORT [[id number]] Collected: 11/02/17 0615     Order Status: Completed Updated: 11/02/17 0618     Narrative:       CHEST X-RAY, one view. HISTORY:  Chest pain    TECHNIQUE:  AP upright portable view. COMPARISON: 31 October 2017       Impression:       IMPRESSION:   Inspiration is better. Pleural fluid at the right base. Probable  small amounts of bibasilar atelectasis. Left-sided chest port is present.       CT HEAD W WO CONT [[id number]] Collected: 11/01/17 1349     Order Status: Completed Updated: 11/01/17 1354     Narrative:       Examination: CT scan of the brain with and without contrast.    History: acute altered mental status, 75 years Male confusion, history of  metastatic colon cancer to the liver    Technique: 5 mm axial imaging of the brain from the posterior fossa to the  vertex was performed before and after uneventful intravenous administration of  100 cc of nonionic IV contrast.  Radiation dose reduction techniques were used  for this study:  Our CT scanners use one or all of the following: Automated  exposure control, adjustment of the mA and/or kVp according to patient's size,  iterative reconstruction. Comparison:  None available    Findings:  The brain parenchyma appears within normal limits for age.  The  ventricles, sulci are age-appropriate.  No intracranial hemorrhage or extra-axial  collection is identified.  No evidence of acute infarct.  No mass effect or  midline shift is present. Clance Feast is no evidence of enhancing mass or abnormal  enhancement on postcontrast imaging.  Basal cisterns are intact.  The visualized  paranasal sinuses and mastoid air cells are clear. The orbits, bones, and soft  tissues are normal in appearance.       Impression:       IMPRESSION:  Normal CT of the brain for age. No acute intracranial abnormality  or evidence of metastatic disease.     US ABD LTD [[id number]] Collected: 11/01/17 1014     Order Status: Completed Updated: 11/01/17 1019     Narrative:       Right upper quadrant abdominal ultrasound    History: Jaundice, h/o biliary stricture, metastatic colon cancer, 75 years Male    Comparison: Abdominal ultrasound October 14, 2017, CT torso September 20, 2017. Findings: The liver is heterogeneous in echotexture and there is no dilatation  of the intrahepatic ducts.  Again visualized is a small hypoechoic mass adjacent  to the right hepatic lobe which may be subcapsular or subserosal measuring 2.2 x  1.4 x 1.4 cm, consistent with a metastatic deposit as seen on prior CT torso. The liver measures 14 cm in midclavicular length.  Normal hepatopedal flow  within the main portal vein.  The gallbladder is surgically absent. The proximal  common duct is not dilated and measures 7 mm, containing a biliary stent.  The  right kidney is normal appearing and measures 8.3 cm in length.  Pancreas not  well-visualized due to overlying bowel gas. The visualized portions of the IVC  are unremarkable.  And aorta not visualized due to overlying bowel gas.  Small  volume ascites unchanged.  Small right pleural effusion unchanged.       Impression:       Impression:     1.  No significant interval change in hepatic serosal metastatic deposits, small  volume ascites, and small right pleural effusion.   2.  Other chronic findings as above.     XR ABD (KUB) [[id number]] Collected: 11/01/17 0900     Order Status: Completed Updated: 11/01/17 0903     Narrative:       KUB supine views    History:  decreased bowel sounds, abdominal pain, 75 years Male    Comparison:  Chest radiograph October 13, 2016, yesterday    Findings: Biliary stent appears in anatomic position.  No free air is evident. The bowel gas pattern is nonspecific and there is no evidence of ileus or  obstruction.  No suspicious renal or ureteral calculi are evident.  Small right  and trace left pleural effusions relatively unchanged since yesterday. Visualized osseous structures unremarkable.       Impression:       Impression: No acute intra-abdominal pathology identified.             XR CHEST PORT [[id number]] Collected: 10/31/17 1324     Order Status: Completed Updated: 10/31/17 1329     Narrative:       1 View portable chest x-ray 10/31/2017 1:21 PM    Indication: Chest pressure pain. Patient has history of colon cancer. Comparison: CT chest 9/20/2017    Findings: This portable upright AP chest of 1304 shows the lungs somewhat  underventilated. An implanted venous port does remain in place on the left. The  heart is not significantly enlarged. There is clearly increased reticular and  hazy opacities on the right with the hemidiaphragm and costophrenic angle  obscured. The left lung appears overall still clear.       Impression:       IMPRESSION: Probably there is now a large right pleural effusion with associated  atelectasis.              Current Discharge Medication List      STOP taking these medications       traMADol (ULTRAM) 50 mg tablet Comments:   Reason for Stopping:         levoFLOXacin (LEVAQUIN) 500 mg tablet Comments:   Reason for Stopping:         metroNIDAZOLE (FLAGYL) 500 mg tablet Comments:   Reason for Stopping:         meloxicam (MOBIC) 7.5 mg tablet Comments:   Reason for Stopping:         pantoprazole (PROTONIX) 40 mg tablet Comments:   Reason for Stopping:         ondansetron hcl (ZOFRAN) 8 mg tablet Comments:   Reason for Stopping:         prochlorperazine (COMPAZINE) 10 mg tablet Comments:   Reason for Stopping: omeprazole (PRILOSEC) 20 mg capsule Comments:   Reason for Stopping:         magnesium oxide (MAG-OX) 400 mg tablet Comments:   Reason for Stopping:         DOCUSATE CALCIUM (STOOL SOFTENER PO) Comments:   Reason for Stopping:         pyridoxine (VITAMIN B-6) 50 mg tablet Comments:   Reason for Stopping:         multivitamin (ONE A DAY) tablet Comments:   Reason for Stopping:                 OBJECTIVE:  Patient Vitals for the past 8 hrs:   BP Temp Pulse Resp SpO2   17 1100 (!) 88/45 97.2 °F (36.2 °C) (!) 113 26 91 %   17 0730 - - - - 91 %   17 0720 (!) 86/56 97.4 °F (36.3 °C) (!) 114 28 91 %     Temp (24hrs), Av.5 °F (36.4 °C), Min:97.2 °F (36.2 °C), Max:97.6 °F (36.4 °C)         Physical Exam:  Constitutional: Critically ill appearing male in no acute distress    HEENT: Normocephalic and atraumatic. Neck supple    Lymph node   Deferred   Skin Warm and dry. + jaundice   Respiratory Lungs are clear     CVS Normal rate, regular rhythm    Abdomen Soft,mild distention   Neuro Obtunded   MSK Anasarca     Psych calm       ASSESSMENT:    Principal Problem:    Colon cancer metastasized to liver ([facility] [address] (10/31/2017)    Active Problems:    Hypoxemia (10/10/2016)      Acute liver failure (10/9/2017)      Altered mental state (10/31/2017)      Failure to thrive (0-17) (10/31/2017)      Acute kidney injury ([address] (10/31/2017)      Pleural effusion on right (2017)      Overview: 17Right thoracentesis:  1300 cc of bright yellow fluid removed        DISPOSITION:  Follow-up Appointments   Procedures    FOLLOW UP VISIT Appointment in: Other (Deborah Goldman) As needed     As needed     Standing Status:   Standing     Number of Occurrences:   1     Order Specific Question:   Appointment in     Answer: Other (Specify)       Over 30 minutes was spent in discharge planning and coordination of care.             [facility]  [facility] [address] [id number]  Office : [phone]  Fax : [phone]               Attending Addendum:  I personally evaluated the patient with Dio Magaña N.P.,  and agree with the assessment, findings and plan as documented. Appears well, heart regular without murmur, lungs clear, abdomen benign. Appears comfortable. For discharge to hospice. I spent 32 minutes on evaluation, management, counseling and discharge planning on patient.               Tess Pearson MD  [address]  Office : [phone]  Fax : [phone]

## 2017-11-08 NOTE — PROGRESS NOTES
CT results reported to Jessika Reeder NP. Prashanth advised RN to hold on placing NG tube in the morning until oncology rounds 11/8/17 to determine pt's status.

## 2017-11-08 NOTE — PROGRESS NOTES
Patient being discharged to [address] [place] [address] via [employer]. Paperwork including DNR given to staff of [facility] [place].

## 2017-11-08 NOTE — HOSPICE
Met with spouse, patient's brother and patient's friend at bedside. Patient unresponsive and unable to participate in the conversation. Hospice philosophy, level of care, symptom and medication management discussed. Family feel at this time they would like comfort measures only. Case discussed with Dr. Madisyn Singh and patient approved for [facility] level of care at [facility]. Transport set for 1600 to room 119 today.  Thank you for this referral.  INOCENCIO Del Rosario Nurse Liaison  [facility]  [phone]

## 2017-11-08 NOTE — PROGRESS NOTES
END OF SHIFT NOTE:    Intake/Output  11/07 1901 - 11/08 0700  In: 1068 [I.V.:1068]  Out: 75 [Urine:75]   Voiding: YES  Catheter: YES  Drain:              Stool:  0 occurrences. Emesis:  0 occurrences. VITAL SIGNS  Patient Vitals for the past 12 hrs:   Temp Pulse Resp BP SpO2   11/08/17 0404 97.6 °F (36.4 °C) (!) 114 27 97/58 91 %   11/07/17 2330 97.5 °F (36.4 °C) (!) 111 20 115/62 93 %   11/07/17 2229 97.6 °F (36.4 °C) (!) 114 20 128/69 91 %   11/07/17 2021 97.6 °F (36.4 °C) (!) 108 22 124/70 93 %       Pain Assessment  Pain 1  Pain Scale 1: Adult Nonverbal Pain Scale (11/07/17 1925)  Pain Intensity 1: 0 (11/07/17 1925)  Patient Stated Pain Goal: Unable to verbalize/indicatate pain (11/05/17 2320)  Pain Reassessment 1: Patient sleeping (11/06/17 0019)  Pain Onset 1:  (left leg seemed to cause him a lot of pain with movement.) (11/03/17 1349)  Pain Location 1:  (unable to verbalize/indicate) (11/05/17 2320)  Pain Orientation 1:  (unable to verbalize/indicate) (11/05/17 2320)  Pain Description 1:  (unable to verbalize/indicate) (11/05/17 2320)  Pain Intervention(s) 1: Medication (see MAR) (11/05/17 2320)    Ambulating  No    Additional Information: Please see previous RN progress notes. Shift report given to oncoming nurse at the bedside.     Kael Grissom RN

## 2017-11-09 NOTE — PROGRESS NOTES
Patient observed to be without respirations and pulse; no audible heart sounds via stethoscope; wife is at bedside

## 2017-11-10 NOTE — [facility] BEREAVEMENT NOTES
PATIENT DEATH AND FAMILY BEREAVEMENT NEEDS DISCUSSED BY ID[facility]. BEREAVEMENT RISK DETERMINED TO BE LOW. BEREAVEMENT SUPPORT TO BE PROVIDED ACCORDINGLY.

## 2017-11-16 ENCOUNTER — APPOINTMENT (OUTPATIENT)
Dept: INFUSION THERAPY | Age: 67
End: 2017-11-16

## 2017-11-24 LAB
Lab: NORMAL
REFERENCE LAB,REFLB: NORMAL
TEST DESCRIPTION:,ATST: NORMAL

## 2017-11-28 LAB
BACTERIA SPEC CULT: ABNORMAL
GRAM STN SPEC: ABNORMAL
SERVICE CMNT-IMP: ABNORMAL
SERVICE CMNT-IMP: ABNORMAL

## 2017-11-29 LAB
BACTERIA SPEC CULT: ABNORMAL
GRAM STN SPEC: ABNORMAL
GRAM STN SPEC: ABNORMAL
SERVICE CMNT-IMP: ABNORMAL

## 2017-11-30 ENCOUNTER — APPOINTMENT (OUTPATIENT)
Dept: INFUSION THERAPY | Age: 67
End: 2017-11-30

## 2017-11-30 LAB
FUNGUS CULTURE, RFCO2T: NORMAL
FUNGUS SMEAR, RFCO1T: NORMAL
FUNGUS SPEC CULT: NORMAL
FUNGUS STAIN, 188244: NORMAL
REFLEX TO ID, RFCO3T: NORMAL
SPECIMEN SOURCE: NORMAL
SPECIMEN SOURCE: NORMAL

## 2017-12-01 LAB
Lab: NORMAL
REFERENCE LAB,REFLB: NORMAL
TEST DESCRIPTION:,ATST: NORMAL

## 2017-12-16 LAB
ACID FAST STN SPEC: NEGATIVE
MYCOBACTERIUM SPEC QL CULT: NEGATIVE
SPECIMEN PREPARATION: NORMAL
SPECIMEN SOURCE: NORMAL

## 2017-12-28 ENCOUNTER — APPOINTMENT (OUTPATIENT)
Dept: INFUSION THERAPY | Age: 67
End: 2017-12-28

## 2018-01-11 ENCOUNTER — APPOINTMENT (OUTPATIENT)
Dept: INFUSION THERAPY | Age: 68
End: 2018-01-11

## (undated) DEVICE — SNARE POLYP SM W13MMXL240CM SHTH DIA2.4MM OVL FLX DISP

## (undated) DEVICE — KENDALL RADIOLUCENT FOAM MONITORING ELECTRODE RECTANGULAR SHAPE: Brand: KENDALL

## (undated) DEVICE — GEL MEDC ULTRASOUND 5L -- REPLACED BY 326862

## (undated) DEVICE — DEVICE LCK BILI RAP EXCHG OLPS --

## (undated) DEVICE — REM POLYHESIVE ADULT PATIENT RETURN ELECTRODE: Brand: VALLEYLAB

## (undated) DEVICE — SYR 5ML 1/5 GRAD LL NSAF LF --

## (undated) DEVICE — SYR 3ML LL TIP 1/10ML GRAD --

## (undated) DEVICE — NDL PRT INJ NSAF BLNT 18GX1.5 --

## (undated) DEVICE — STERILE POLYISOPRENE POWDER-FREE SURGICAL GLOVES: Brand: PROTEXIS

## (undated) DEVICE — ADULT SPO2 SENSOR: Brand: NELLCOR

## (undated) DEVICE — CANNULA NSL ORAL AD FOR CAPNOFLEX CO2 O2 AIRLFE

## (undated) DEVICE — CONNECTOR TBNG OD5-7MM O2 END DISP

## (undated) DEVICE — (D)SYR 10ML 1/5ML GRAD NSAF -- PKGING CHANGE USE ITEM 338027

## (undated) DEVICE — KENDALL SCD EXPRESS SLEEVES, KNEE LENGTH, MEDIUM: Brand: KENDALL SCD

## (undated) DEVICE — BLOCK BITE AD 60FR W/ VELC STRP ADDRESSES MOST PT AND

## (undated) DEVICE — SPHINCTEROTOME: Brand: JAGTOME RX 39

## (undated) DEVICE — WIREGUIDED CYTOLOGY BRUSH: Brand: RX CYTOLOGY BRUSH

## (undated) DEVICE — KIT THORCENT 8FR L5IN POLYUR W/ 18/22/25GA NDL 3 W STPCOCK